# Patient Record
Sex: FEMALE | Race: WHITE | HISPANIC OR LATINO | Employment: UNEMPLOYED | URBAN - METROPOLITAN AREA
[De-identification: names, ages, dates, MRNs, and addresses within clinical notes are randomized per-mention and may not be internally consistent; named-entity substitution may affect disease eponyms.]

---
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
## 2018-12-12 ENCOUNTER — LAB (OUTPATIENT)
Dept: LAB | Facility: HOSPITAL | Age: 18
End: 2018-12-12

## 2018-12-12 ENCOUNTER — INITIAL PRENATAL (OUTPATIENT)
Dept: OBSTETRICS AND GYNECOLOGY | Facility: CLINIC | Age: 18
End: 2018-12-12

## 2018-12-12 VITALS — SYSTOLIC BLOOD PRESSURE: 122 MMHG | BODY MASS INDEX: 32.37 KG/M2 | DIASTOLIC BLOOD PRESSURE: 70 MMHG | WEIGHT: 177 LBS

## 2018-12-12 DIAGNOSIS — O21.9 NAUSEA AND VOMITING IN PREGNANCY PRIOR TO 22 WEEKS GESTATION: ICD-10-CM

## 2018-12-12 DIAGNOSIS — R10.9 ABDOMINAL PAIN IN PREGNANCY, FIRST TRIMESTER: ICD-10-CM

## 2018-12-12 DIAGNOSIS — O26.891 ABDOMINAL PAIN IN PREGNANCY, FIRST TRIMESTER: ICD-10-CM

## 2018-12-12 DIAGNOSIS — Z34.01 ENCOUNTER FOR SUPERVISION OF NORMAL FIRST PREGNANCY IN FIRST TRIMESTER: Primary | ICD-10-CM

## 2018-12-12 DIAGNOSIS — Z34.01 ENCOUNTER FOR SUPERVISION OF NORMAL FIRST PREGNANCY IN FIRST TRIMESTER: ICD-10-CM

## 2018-12-12 LAB
AMPHET+METHAMPHET UR QL: NEGATIVE
AMPHET+METHAMPHET UR QL: NEGATIVE
AMPHETAMINES UR QL: NEGATIVE
AMPHETAMINES UR QL: NEGATIVE
BARBITURATES UR QL SCN: NEGATIVE
BARBITURATES UR QL SCN: NEGATIVE
BASOPHILS # BLD AUTO: 0.05 10*3/MM3 (ref 0–0.2)
BASOPHILS NFR BLD AUTO: 0.4 % (ref 0–1)
BENZODIAZ UR QL SCN: NEGATIVE
BENZODIAZ UR QL SCN: NEGATIVE
BUPRENORPHINE SERPL-MCNC: NEGATIVE NG/ML
BUPRENORPHINE SERPL-MCNC: NEGATIVE NG/ML
CANNABINOIDS SERPL QL: POSITIVE
CANNABINOIDS SERPL QL: POSITIVE
COCAINE UR QL: NEGATIVE
COCAINE UR QL: NEGATIVE
DEPRECATED RDW RBC AUTO: 39 FL (ref 37–54)
EOSINOPHIL # BLD AUTO: 0.07 10*3/MM3 (ref 0–0.3)
EOSINOPHIL NFR BLD AUTO: 0.6 % (ref 0–3)
ERYTHROCYTE [DISTWIDTH] IN BLOOD BY AUTOMATED COUNT: 12.6 % (ref 11.3–14.5)
HBV SURFACE AG SERPL QL IA: NORMAL
HCT VFR BLD AUTO: 42.8 % (ref 34.5–44)
HCV AB SER DONR QL: NORMAL
HGB BLD-MCNC: 14.5 G/DL (ref 11.5–15.5)
HIV1+2 AB SER QL: NORMAL
IMM GRANULOCYTES # BLD: 0.03 10*3/MM3 (ref 0–0.03)
IMM GRANULOCYTES NFR BLD: 0.2 % (ref 0–0.6)
LYMPHOCYTES # BLD AUTO: 1.79 10*3/MM3 (ref 0.6–4.8)
LYMPHOCYTES NFR BLD AUTO: 14.6 % (ref 24–44)
MCH RBC QN AUTO: 28.9 PG (ref 27–31)
MCHC RBC AUTO-ENTMCNC: 33.9 G/DL (ref 32–36)
MCV RBC AUTO: 85.3 FL (ref 80–99)
METHADONE UR QL SCN: NEGATIVE
METHADONE UR QL SCN: NEGATIVE
MONOCYTES # BLD AUTO: 0.79 10*3/MM3 (ref 0–1)
MONOCYTES NFR BLD AUTO: 6.5 % (ref 0–12)
NEUTROPHILS # BLD AUTO: 9.52 10*3/MM3 (ref 1.5–8.3)
NEUTROPHILS NFR BLD AUTO: 77.9 % (ref 41–71)
OPIATES UR QL: NEGATIVE
OPIATES UR QL: NEGATIVE
OXYCODONE UR QL SCN: NEGATIVE
OXYCODONE UR QL SCN: NEGATIVE
PCP UR QL SCN: NEGATIVE
PCP UR QL SCN: NEGATIVE
PLATELET # BLD AUTO: 365 10*3/MM3 (ref 150–450)
PMV BLD AUTO: 9.7 FL (ref 6–12)
PROPOXYPH UR QL: NEGATIVE
PROPOXYPH UR QL: NEGATIVE
RBC # BLD AUTO: 5.02 10*6/MM3 (ref 3.89–5.14)
RUBV IGG SER QL: NORMAL
RUBV IGG SER-ACNC: 251.7 IU/ML
TRICYCLICS UR QL SCN: NEGATIVE
TRICYCLICS UR QL SCN: NEGATIVE
WBC NRBC COR # BLD: 12.22 10*3/MM3 (ref 4.5–13.5)

## 2018-12-12 PROCEDURE — 86901 BLOOD TYPING SEROLOGIC RH(D): CPT

## 2018-12-12 PROCEDURE — G0432 EIA HIV-1/HIV-2 SCREEN: HCPCS

## 2018-12-12 PROCEDURE — 86850 RBC ANTIBODY SCREEN: CPT

## 2018-12-12 PROCEDURE — 87086 URINE CULTURE/COLONY COUNT: CPT

## 2018-12-12 PROCEDURE — G0480 DRUG TEST DEF 1-7 CLASSES: HCPCS

## 2018-12-12 PROCEDURE — 80306 DRUG TEST PRSMV INSTRMNT: CPT

## 2018-12-12 PROCEDURE — 85025 COMPLETE CBC W/AUTO DIFF WBC: CPT

## 2018-12-12 PROCEDURE — 99204 OFFICE O/P NEW MOD 45 MIN: CPT | Performed by: OBSTETRICS & GYNECOLOGY

## 2018-12-12 PROCEDURE — 86803 HEPATITIS C AB TEST: CPT

## 2018-12-12 PROCEDURE — 80081 OBSTETRIC PANEL INC HIV TSTG: CPT

## 2018-12-12 PROCEDURE — 86762 RUBELLA ANTIBODY: CPT

## 2018-12-12 PROCEDURE — 80306 DRUG TEST PRSMV INSTRMNT: CPT | Performed by: OBSTETRICS & GYNECOLOGY

## 2018-12-12 PROCEDURE — 87340 HEPATITIS B SURFACE AG IA: CPT

## 2018-12-12 PROCEDURE — 86900 BLOOD TYPING SEROLOGIC ABO: CPT

## 2018-12-12 PROCEDURE — 87086 URINE CULTURE/COLONY COUNT: CPT | Performed by: OBSTETRICS & GYNECOLOGY

## 2018-12-12 RX ORDER — ONDANSETRON 4 MG/1
4 TABLET, FILM COATED ORAL EVERY 8 HOURS PRN
Qty: 30 TABLET | Refills: 1 | Status: ON HOLD | OUTPATIENT
Start: 2018-12-12 | End: 2019-04-25 | Stop reason: SDUPTHER

## 2018-12-12 RX ORDER — DIPHENHYDRAMINE HYDROCHLORIDE 25 MG/1
25 CAPSULE ORAL EVERY 8 HOURS PRN
Qty: 30 TABLET | Refills: 1 | Status: ON HOLD | OUTPATIENT
Start: 2018-12-12 | End: 2019-07-03

## 2018-12-12 NOTE — PROGRESS NOTES
Subjective   Chief Complaint   Patient presents with   • Initial Prenatal Visit     New OB, luiies c/o's       More Gaxiola is a 18 y.o. year old .  Patient's last menstrual period was 10/10/2018.  She presents to be seen to initiate prenatal care. LMP 10/10/18 - sure of this. Some mild vaginal spotting. No gross bleeding. Mild cramping. +Nausea and emesis.     Social History    Tobacco Use      Smoking status: Former Smoker        Types: Cigarettes        Quit date: 2018        Years since quittin.0      The following portions of the patient's history were reviewed and updated as appropriate:vital signs, allergies, current medications, past medical history, past social history, past surgical history and problem list.    Objective    Vitals:    18 1029   BP: 122/70     EXAM   General: NAD, appears stated age  Thyroid: normal exa   Abdomen: soft, nontender, gravid  Pelvic: NEFG, normal appearing cervix. Closed cervix. No adnexal masses or tenderness.       Lab Review   No data reviewed    Imaging   No data reviewed    Assessment/Plan   ASSESSMENT  1. 18 y.o. year old  at 9w0d by sure LMP  2. Supervision of low risk pregnancy   3. Abdominal pain in pregnancy     PLAN  1. The problem list for pregnancy was initiated today  2. Tests ordered today:  Orders Placed This Encounter   Procedures   • Urine Culture - Urine, Urine, Clean Catch   • Chlamydia trachomatis, Neisseria gonorrhoeae, Trichomonas vaginalis, PCR - Swab, Cervix     Standing Status:   Future     Standing Expiration Date:   2019   • US Ob Transvaginal     Standing Status:   Future     Standing Expiration Date:   2019     Order Specific Question:   Reason for Exam:     Answer:   establish EDC, abdominal pain in pregnancy, LMP 10/10/18   • Urine Drug Screen - Urine, Clean Catch     Please confirm all positive UDS   • OB Panel With HIV     Standing Status:   Future     Standing Expiration Date:   2019   • Hepatitis C  Antibody     Standing Status:   Future     Standing Expiration Date:   12/12/2019     3. Testing for GC / Chlamydia / trichomonas was done today  4. Genetic testing reviewed: MSAFP-4, NIPT ([facility]) and they will consider the information and make a decision at a later date.  5. Information reviewed: exercise in pregnancy, nutrition in pregnancy, weight gain in pregnancy, work and travel restrictions during pregnancy, list of OTC medications acceptable in pregnancy and call coverage groups    Total time spent today with More  was 50 minutes (level 4).  Off this time, > 50% was spent face-to-face time coordinating care, answering her questions and counseling regarding pathophysiology of her presenting problem along with plans for any diagnositc work-up and treatment.    Follow up: 4 week(s)       This note was electronically signed.    Eli Rodriguez MD  December 12, 2018

## 2018-12-13 LAB
ABO GROUP BLD: NORMAL
BLD GP AB SCN SERPL QL: NEGATIVE
RH BLD: POSITIVE

## 2018-12-14 LAB
BACTERIA SPEC AEROBE CULT: NORMAL
BACTERIA SPEC AEROBE CULT: NORMAL
RPR SER QL: NORMAL

## 2018-12-17 ENCOUNTER — TELEPHONE (OUTPATIENT)
Dept: OBSTETRICS AND GYNECOLOGY | Facility: CLINIC | Age: 18
End: 2018-12-17

## 2018-12-17 PROBLEM — Z34.00 SUPERVISION OF NORMAL FIRST PREGNANCY: Status: ACTIVE | Noted: 2018-12-17

## 2018-12-17 PROBLEM — Z34.01 ENCOUNTER FOR SUPERVISION OF NORMAL FIRST PREGNANCY IN FIRST TRIMESTER: Status: RESOLVED | Noted: 2018-12-12 | Resolved: 2018-12-17

## 2018-12-18 LAB — CANNABINOIDS UR QL CFM: POSITIVE

## 2019-01-16 ENCOUNTER — ROUTINE PRENATAL (OUTPATIENT)
Dept: OBSTETRICS AND GYNECOLOGY | Facility: CLINIC | Age: 19
End: 2019-01-16

## 2019-01-16 VITALS — SYSTOLIC BLOOD PRESSURE: 118 MMHG | DIASTOLIC BLOOD PRESSURE: 62 MMHG | WEIGHT: 176 LBS | BODY MASS INDEX: 32.19 KG/M2

## 2019-01-16 DIAGNOSIS — Z34.02 ENCOUNTER FOR SUPERVISION OF NORMAL FIRST PREGNANCY IN SECOND TRIMESTER: Primary | ICD-10-CM

## 2019-01-16 DIAGNOSIS — O21.9 NAUSEA AND VOMITING IN PREGNANCY PRIOR TO 22 WEEKS GESTATION: ICD-10-CM

## 2019-01-16 PROCEDURE — 99213 OFFICE O/P EST LOW 20 MIN: CPT | Performed by: OBSTETRICS & GYNECOLOGY

## 2019-01-16 NOTE — PROGRESS NOTES
Chief Complaint   Patient presents with   • Routine Prenatal Visit     ob visit, reports sharp shootong pains in right abd       HPI: More is a  currently at 14w0d who today reports the following:  Nausea - improved as compared to the prior visit; Vaginal bleeding -  No; Heartburn - No.    ROS:  GI: Constipation - improved as compared to the prior visit; Diarrhea - No    Neuro: Headache - No; Visual change - No      EXAM:  Vitals: See prenatal flowsheet   Abdomen: See prenatal flowsheet   Urine glucose/protein: See prenatal flowsheet   Pelvic: See prenatal flowsheet     Prenatal Labs  Lab Results   Component Value Date    HGB 14.5 2018    RUBELLAABIGG 251.7 2018    RUBELLAABIGG Immune 2018    HEPBSAG Non-Reactive 2018    ABSCRN Negative 2018    QKJ7RWH2 Non-Reactive 2018    HEPCVIRUSABY Non-Reactive 2018    URINECX >100,000 CFU/mL Normal Urogenital Di 2018       MDM:  Impression: 1. Supervision of low risk pregnancy   Tests done today: 1. none   Topics discussed: 1. Continue with PNV's  2. Prenatal labs reviewed  3. Discussed MSAFP-4 and NIPS. Patient and partner decline genetic screening.  4. none - she had no major complaints,questions or concerns   5. Follow OB appointment in 5 weeks.   Tests scheduled today for her next visit:   U/S for anatomic screening

## 2019-02-20 ENCOUNTER — ROUTINE PRENATAL (OUTPATIENT)
Dept: OBSTETRICS AND GYNECOLOGY | Facility: CLINIC | Age: 19
End: 2019-02-20

## 2019-02-20 VITALS — SYSTOLIC BLOOD PRESSURE: 118 MMHG | WEIGHT: 187 LBS | DIASTOLIC BLOOD PRESSURE: 70 MMHG | BODY MASS INDEX: 34.2 KG/M2

## 2019-02-20 DIAGNOSIS — O21.9 NAUSEA AND VOMITING IN PREGNANCY PRIOR TO 22 WEEKS GESTATION: ICD-10-CM

## 2019-02-20 DIAGNOSIS — Z34.02 ENCOUNTER FOR SUPERVISION OF NORMAL FIRST PREGNANCY IN SECOND TRIMESTER: ICD-10-CM

## 2019-02-20 PROCEDURE — 99213 OFFICE O/P EST LOW 20 MIN: CPT | Performed by: OBSTETRICS & GYNECOLOGY

## 2019-02-20 NOTE — PROGRESS NOTES
Chief Complaint   Patient presents with   • Routine Prenatal Visit     ob visit with [facility] u/s       HPI: More is a  currently at 19w0d who today reports the following:  Contractions - No; Leaking - No; Vaginal bleeding -  No; Heartburn - No.    ROS:  GI: Nausea - No ; Constipation - No; Diarrhea - No    Neuro: Headache - No ; Visual change - No      EXAM:  Vitals: See prenatal flowsheet   Abdomen: See prenatal flowsheet   Urine glucose/protein: See prenatal flowsheet   Pelvic: See prenatal flowsheet     Lab Results   Component Value Date    ABO O 2018    RH Positive 2018    ABSCRN Negative 2018       MDM:  Impression: 1. Supervision of low risk pregnancy   Tests done today: 1. U/S for anatomic screening - anatomy was not completely seen today   Topics discussed: 1. Continue with PNV's  2. Prenatal labs reviewed  3. none - she had no major complaints,questions or concerns   Tests scheduled today for her next visit:   U/S to complete the anatomic screening in 4 weeks

## 2019-03-20 ENCOUNTER — ROUTINE PRENATAL (OUTPATIENT)
Dept: OBSTETRICS AND GYNECOLOGY | Facility: CLINIC | Age: 19
End: 2019-03-20

## 2019-03-20 VITALS — SYSTOLIC BLOOD PRESSURE: 118 MMHG | BODY MASS INDEX: 32.63 KG/M2 | DIASTOLIC BLOOD PRESSURE: 78 MMHG | WEIGHT: 178.4 LBS

## 2019-03-20 DIAGNOSIS — Z34.02 ENCOUNTER FOR SUPERVISION OF NORMAL FIRST PREGNANCY IN SECOND TRIMESTER: Primary | ICD-10-CM

## 2019-03-20 PROBLEM — O21.9 NAUSEA AND VOMITING IN PREGNANCY PRIOR TO 22 WEEKS GESTATION: Status: RESOLVED | Noted: 2018-12-12 | Resolved: 2019-03-20

## 2019-03-20 PROCEDURE — 99213 OFFICE O/P EST LOW 20 MIN: CPT | Performed by: OBSTETRICS & GYNECOLOGY

## 2019-03-20 NOTE — PROGRESS NOTES
Chief Complaint   Patient presents with   • Routine Prenatal Visit     pt states no c/o       HPI: More is a  currently at 23w0d who today reports the following:  Contractions - No; Leaking - No; Vaginal bleeding -  No; Heartburn - No.    ROS:  GI: Nausea - No ; Constipation - No; Diarrhea - No    Neuro: Headache - No ; Visual change - No      EXAM:  Vitals: See prenatal flowsheet   Abdomen: See prenatal flowsheet   Urine glucose/protein: See prenatal flowsheet   Pelvic: See prenatal flowsheet     Lab Results   Component Value Date    ABO O 2018    RH Positive 2018    ABSCRN Negative 2018       MDM:  Impression: 1. Supervision of low risk pregnancy   Tests done today: 1. U/S to complete the anatomic screening - U/S report is not available for review at this time   Topics discussed: 1. Continue with PNV's  2. Prenatal labs reviewed  3. Fetal kick counts at 25+ weeks   Tests scheduled today for her next visit:   GCT  HgB

## 2019-04-17 ENCOUNTER — APPOINTMENT (OUTPATIENT)
Dept: LAB | Facility: HOSPITAL | Age: 19
End: 2019-04-17

## 2019-04-17 ENCOUNTER — ROUTINE PRENATAL (OUTPATIENT)
Dept: OBSTETRICS AND GYNECOLOGY | Facility: CLINIC | Age: 19
End: 2019-04-17

## 2019-04-17 VITALS — BODY MASS INDEX: 33.69 KG/M2 | DIASTOLIC BLOOD PRESSURE: 92 MMHG | SYSTOLIC BLOOD PRESSURE: 132 MMHG | WEIGHT: 184.2 LBS

## 2019-04-17 DIAGNOSIS — Z34.02 ENCOUNTER FOR SUPERVISION OF NORMAL FIRST PREGNANCY IN SECOND TRIMESTER: ICD-10-CM

## 2019-04-17 DIAGNOSIS — O16.2 ELEVATED BLOOD PRESSURE AFFECTING PREGNANCY IN SECOND TRIMESTER, ANTEPARTUM: Primary | ICD-10-CM

## 2019-04-17 PROBLEM — O16.9 ELEVATED BLOOD PRESSURE AFFECTING PREGNANCY, ANTEPARTUM: Status: ACTIVE | Noted: 2019-04-17

## 2019-04-17 LAB
ALP SERPL-CCNC: 109 U/L (ref 39–117)
ALT SERPL W P-5'-P-CCNC: 27 U/L (ref 1–33)
AST SERPL-CCNC: 20 U/L (ref 1–32)
BILIRUB SERPL-MCNC: 0.2 MG/DL (ref 0.2–1.2)
CREAT BLD-MCNC: 0.59 MG/DL (ref 0.57–1)
DEPRECATED RDW RBC AUTO: 39.8 FL (ref 37–54)
ERYTHROCYTE [DISTWIDTH] IN BLOOD BY AUTOMATED COUNT: 12.7 % (ref 12.3–15.4)
HCT VFR BLD AUTO: 36.9 % (ref 34–46.6)
HGB BLD-MCNC: 11.6 G/DL (ref 12–15.9)
LDH SERPL-CCNC: 285 U/L (ref 135–214)
MCH RBC QN AUTO: 27.4 PG (ref 26.6–33)
MCHC RBC AUTO-ENTMCNC: 31.4 G/DL (ref 31.5–35.7)
MCV RBC AUTO: 87 FL (ref 79–97)
PLATELET # BLD AUTO: 311 10*3/MM3 (ref 140–450)
PMV BLD AUTO: 10.9 FL (ref 6–12)
RBC # BLD AUTO: 4.24 10*6/MM3 (ref 3.77–5.28)
URATE SERPL-MCNC: 4.9 MG/DL (ref 2.4–5.7)
WBC NRBC COR # BLD: 10.73 10*3/MM3 (ref 3.4–10.8)

## 2019-04-17 PROCEDURE — 85027 COMPLETE CBC AUTOMATED: CPT | Performed by: OBSTETRICS & GYNECOLOGY

## 2019-04-17 PROCEDURE — 82565 ASSAY OF CREATININE: CPT | Performed by: OBSTETRICS & GYNECOLOGY

## 2019-04-17 PROCEDURE — 82247 BILIRUBIN TOTAL: CPT | Performed by: OBSTETRICS & GYNECOLOGY

## 2019-04-17 PROCEDURE — 84450 TRANSFERASE (AST) (SGOT): CPT | Performed by: OBSTETRICS & GYNECOLOGY

## 2019-04-17 PROCEDURE — 36415 COLL VENOUS BLD VENIPUNCTURE: CPT | Performed by: OBSTETRICS & GYNECOLOGY

## 2019-04-17 PROCEDURE — 83615 LACTATE (LD) (LDH) ENZYME: CPT | Performed by: OBSTETRICS & GYNECOLOGY

## 2019-04-17 PROCEDURE — 84550 ASSAY OF BLOOD/URIC ACID: CPT | Performed by: OBSTETRICS & GYNECOLOGY

## 2019-04-17 PROCEDURE — 84460 ALANINE AMINO (ALT) (SGPT): CPT | Performed by: OBSTETRICS & GYNECOLOGY

## 2019-04-17 PROCEDURE — 99213 OFFICE O/P EST LOW 20 MIN: CPT | Performed by: OBSTETRICS & GYNECOLOGY

## 2019-04-17 PROCEDURE — 84075 ASSAY ALKALINE PHOSPHATASE: CPT | Performed by: OBSTETRICS & GYNECOLOGY

## 2019-04-17 NOTE — PROGRESS NOTES
Chief Complaint   Patient presents with   • Routine Prenatal Visit       HPI: More is a  currently at 27w0d who today reports the following:  Contractions - No; Leaking - No; Vaginal bleeding -  No; Heartburn - No.    ROS:  GI: Nausea - No ; Constipation - No; Diarrhea - No    Neuro: Headache - No ; Visual change - No      EXAM:  Vitals: See prenatal flowsheet   Abdomen: See prenatal flowsheet   Urine glucose/protein: See prenatal flowsheet   Pelvic: See prenatal flowsheet     Lab Results   Component Value Date    ABO O 2018    RH Positive 2018    ABSCRN Negative 2018       MDM:  Impression: 1. Supervision of low risk pregnancy   2. Mild range blood pressure with trace urine protein and asymptomatic    Tests done today: 1. none   Topics discussed: 1. Continue with PNV's  2. Prenatal labs reviewed  3. symptoms of preeclampsia   Tests scheduled today for her next visit:   PEP, CBC, 24 hour urine collection and close follow up in one week

## 2019-04-20 ENCOUNTER — LAB (OUTPATIENT)
Dept: LAB | Facility: HOSPITAL | Age: 19
End: 2019-04-20

## 2019-04-20 DIAGNOSIS — Z34.02 ENCOUNTER FOR SUPERVISION OF NORMAL FIRST PREGNANCY IN SECOND TRIMESTER: ICD-10-CM

## 2019-04-20 LAB
COLLECT DURATION TIME UR: 24 HRS
PROT 24H UR-MRATE: 105 MG/24HOURS (ref 0–150)
PROT UR-MCNC: 6 MG/DL
SPECIMEN VOL 24H UR: 1750 ML

## 2019-04-20 PROCEDURE — 84156 ASSAY OF PROTEIN URINE: CPT

## 2019-04-20 PROCEDURE — 81050 URINALYSIS VOLUME MEASURE: CPT

## 2019-04-23 ENCOUNTER — APPOINTMENT (OUTPATIENT)
Dept: WOMENS IMAGING | Facility: HOSPITAL | Age: 19
End: 2019-04-23

## 2019-04-23 ENCOUNTER — HOSPITAL ENCOUNTER (OUTPATIENT)
Facility: HOSPITAL | Age: 19
Setting detail: OBSERVATION
Discharge: HOME | End: 2019-04-25
Attending: OBSTETRICS & GYNECOLOGY | Admitting: OBSTETRICS & GYNECOLOGY

## 2019-04-23 ENCOUNTER — APPOINTMENT (OUTPATIENT)
Dept: CT IMAGING | Facility: HOSPITAL | Age: 19
End: 2019-04-23

## 2019-04-23 DIAGNOSIS — R10.10 UPPER ABDOMINAL PAIN: ICD-10-CM

## 2019-04-23 DIAGNOSIS — R11.2 INTRACTABLE VOMITING WITH NAUSEA, UNSPECIFIED VOMITING TYPE: Primary | ICD-10-CM

## 2019-04-23 PROBLEM — Z34.90 PREGNANCY: Status: ACTIVE | Noted: 2019-04-23

## 2019-04-23 LAB
ALBUMIN SERPL-MCNC: 3.5 G/DL (ref 3.5–5.2)
ALBUMIN/GLOB SERPL: 1 G/DL
ALP SERPL-CCNC: 114 U/L (ref 39–117)
ALP SERPL-CCNC: 116 U/L (ref 39–117)
ALT SERPL W P-5'-P-CCNC: 16 U/L (ref 1–33)
ALT SERPL W P-5'-P-CCNC: 19 U/L (ref 1–33)
AMYLASE SERPL-CCNC: 70 U/L (ref 28–100)
ANION GAP SERPL CALCULATED.3IONS-SCNC: 15 MMOL/L
AST SERPL-CCNC: 16 U/L (ref 1–32)
AST SERPL-CCNC: 26 U/L (ref 1–32)
BACTERIA UR QL AUTO: ABNORMAL /HPF
BILIRUB SERPL-MCNC: 0.3 MG/DL (ref 0.2–1.2)
BILIRUB SERPL-MCNC: 0.3 MG/DL (ref 0.2–1.2)
BILIRUB UR QL STRIP: NEGATIVE
BUN BLD-MCNC: 6 MG/DL (ref 6–20)
BUN/CREAT SERPL: 14.3 (ref 7–25)
CALCIUM SPEC-SCNC: 8.9 MG/DL (ref 8.6–10.5)
CHLORIDE SERPL-SCNC: 107 MMOL/L (ref 98–107)
CLARITY UR: ABNORMAL
CO2 SERPL-SCNC: 19 MMOL/L (ref 22–29)
COLOR UR: YELLOW
CREAT BLD-MCNC: 0.42 MG/DL (ref 0.57–1)
CREAT BLD-MCNC: 0.42 MG/DL (ref 0.57–1)
DEPRECATED RDW RBC AUTO: 38.5 FL (ref 37–54)
ERYTHROCYTE [DISTWIDTH] IN BLOOD BY AUTOMATED COUNT: 12.7 % (ref 12.3–15.4)
GFR SERPL CREATININE-BSD FRML MDRD: >150 ML/MIN/1.73
GFR SERPL CREATININE-BSD FRML MDRD: >150 ML/MIN/1.73
GLOBULIN UR ELPH-MCNC: 3.4 GM/DL
GLUCOSE BLD-MCNC: 108 MG/DL (ref 65–99)
GLUCOSE BLDC GLUCOMTR-MCNC: 95 MG/DL (ref 70–130)
GLUCOSE UR STRIP-MCNC: NEGATIVE MG/DL
HCT VFR BLD AUTO: 36.1 % (ref 34–46.6)
HGB BLD-MCNC: 12.1 G/DL (ref 12–15.9)
HGB UR QL STRIP.AUTO: NEGATIVE
HYALINE CASTS UR QL AUTO: ABNORMAL /LPF
KETONES UR QL STRIP: NEGATIVE
LDH SERPL-CCNC: 170 U/L (ref 135–214)
LEUKOCYTE ESTERASE UR QL STRIP.AUTO: ABNORMAL
LIPASE SERPL-CCNC: 17 U/L (ref 13–60)
MCH RBC QN AUTO: 28.1 PG (ref 26.6–33)
MCHC RBC AUTO-ENTMCNC: 33.5 G/DL (ref 31.5–35.7)
MCV RBC AUTO: 83.8 FL (ref 79–97)
NITRITE UR QL STRIP: NEGATIVE
PH UR STRIP.AUTO: 6 [PH] (ref 5–8)
PLATELET # BLD AUTO: 288 10*3/MM3 (ref 140–450)
PMV BLD AUTO: 10.2 FL (ref 6–12)
POTASSIUM BLD-SCNC: 4.1 MMOL/L (ref 3.5–5.2)
PROT SERPL-MCNC: 6.9 G/DL (ref 6–8.5)
PROT UR QL STRIP: NEGATIVE
RBC # BLD AUTO: 4.31 10*6/MM3 (ref 3.77–5.28)
RBC # UR: ABNORMAL /HPF
REF LAB TEST METHOD: ABNORMAL
SODIUM BLD-SCNC: 141 MMOL/L (ref 136–145)
SP GR UR STRIP: 1.02 (ref 1–1.03)
SQUAMOUS #/AREA URNS HPF: ABNORMAL /HPF
URATE SERPL-MCNC: 5.1 MG/DL (ref 2.4–5.7)
UROBILINOGEN UR QL STRIP: ABNORMAL
WBC NRBC COR # BLD: 12.96 10*3/MM3 (ref 3.4–10.8)
WBC UR QL AUTO: ABNORMAL /HPF

## 2019-04-23 PROCEDURE — G0378 HOSPITAL OBSERVATION PER HR: HCPCS

## 2019-04-23 PROCEDURE — 80053 COMPREHEN METABOLIC PANEL: CPT | Performed by: OBSTETRICS & GYNECOLOGY

## 2019-04-23 PROCEDURE — 82150 ASSAY OF AMYLASE: CPT | Performed by: OBSTETRICS & GYNECOLOGY

## 2019-04-23 PROCEDURE — 59025 FETAL NON-STRESS TEST: CPT

## 2019-04-23 PROCEDURE — 82962 GLUCOSE BLOOD TEST: CPT

## 2019-04-23 PROCEDURE — 96376 TX/PRO/DX INJ SAME DRUG ADON: CPT

## 2019-04-23 PROCEDURE — 84460 ALANINE AMINO (ALT) (SGPT): CPT | Performed by: OBSTETRICS & GYNECOLOGY

## 2019-04-23 PROCEDURE — 25010000002 PROMETHAZINE PER 50 MG: Performed by: OBSTETRICS & GYNECOLOGY

## 2019-04-23 PROCEDURE — 84075 ASSAY ALKALINE PHOSPHATASE: CPT | Performed by: OBSTETRICS & GYNECOLOGY

## 2019-04-23 PROCEDURE — 96372 THER/PROPH/DIAG INJ SC/IM: CPT

## 2019-04-23 PROCEDURE — 87086 URINE CULTURE/COLONY COUNT: CPT | Performed by: OBSTETRICS & GYNECOLOGY

## 2019-04-23 PROCEDURE — 76811 OB US DETAILED SNGL FETUS: CPT

## 2019-04-23 PROCEDURE — 76811 OB US DETAILED SNGL FETUS: CPT | Performed by: OBSTETRICS & GYNECOLOGY

## 2019-04-23 PROCEDURE — 83690 ASSAY OF LIPASE: CPT | Performed by: OBSTETRICS & GYNECOLOGY

## 2019-04-23 PROCEDURE — 80306 DRUG TEST PRSMV INSTRMNT: CPT | Performed by: INTERNAL MEDICINE

## 2019-04-23 PROCEDURE — 87077 CULTURE AEROBIC IDENTIFY: CPT | Performed by: OBSTETRICS & GYNECOLOGY

## 2019-04-23 PROCEDURE — 25010000002 MAGNESIUM SULFATE-LACT RINGERS 40 GM/580ML SOLUTION: Performed by: OBSTETRICS & GYNECOLOGY

## 2019-04-23 PROCEDURE — 84550 ASSAY OF BLOOD/URIC ACID: CPT | Performed by: OBSTETRICS & GYNECOLOGY

## 2019-04-23 PROCEDURE — 81001 URINALYSIS AUTO W/SCOPE: CPT | Performed by: OBSTETRICS & GYNECOLOGY

## 2019-04-23 PROCEDURE — 74176 CT ABD & PELVIS W/O CONTRAST: CPT

## 2019-04-23 PROCEDURE — 99218 PR INITIAL OBSERVATION CARE/DAY 30 MINUTES: CPT | Performed by: OBSTETRICS & GYNECOLOGY

## 2019-04-23 PROCEDURE — 83615 LACTATE (LD) (LDH) ENZYME: CPT | Performed by: OBSTETRICS & GYNECOLOGY

## 2019-04-23 PROCEDURE — 96366 THER/PROPH/DIAG IV INF ADDON: CPT

## 2019-04-23 PROCEDURE — 84450 TRANSFERASE (AST) (SGOT): CPT | Performed by: OBSTETRICS & GYNECOLOGY

## 2019-04-23 PROCEDURE — 85027 COMPLETE CBC AUTOMATED: CPT | Performed by: OBSTETRICS & GYNECOLOGY

## 2019-04-23 PROCEDURE — 59025 FETAL NON-STRESS TEST: CPT | Performed by: OBSTETRICS & GYNECOLOGY

## 2019-04-23 PROCEDURE — 87186 SC STD MICRODIL/AGAR DIL: CPT | Performed by: OBSTETRICS & GYNECOLOGY

## 2019-04-23 PROCEDURE — 82565 ASSAY OF CREATININE: CPT | Performed by: OBSTETRICS & GYNECOLOGY

## 2019-04-23 PROCEDURE — 25010000002 BETAMETHASONE ACET & SOD PHOS PER 4 MG: Performed by: OBSTETRICS & GYNECOLOGY

## 2019-04-23 PROCEDURE — 96365 THER/PROPH/DIAG IV INF INIT: CPT

## 2019-04-23 PROCEDURE — 96375 TX/PRO/DX INJ NEW DRUG ADDON: CPT

## 2019-04-23 PROCEDURE — 82247 BILIRUBIN TOTAL: CPT | Performed by: OBSTETRICS & GYNECOLOGY

## 2019-04-23 RX ORDER — MAGNESIUM SULF/RINGERS LACTATE 40 G/500ML
2 PLASTIC BAG, INJECTION (ML) INTRAVENOUS CONTINUOUS
Status: DISCONTINUED | OUTPATIENT
Start: 2019-04-23 | End: 2019-04-24

## 2019-04-23 RX ORDER — PROMETHAZINE HYDROCHLORIDE 25 MG/ML
12.5 INJECTION, SOLUTION INTRAMUSCULAR; INTRAVENOUS EVERY 6 HOURS PRN
Status: DISCONTINUED | OUTPATIENT
Start: 2019-04-23 | End: 2019-04-25

## 2019-04-23 RX ORDER — ACETAMINOPHEN 500 MG
1000 TABLET ORAL EVERY 4 HOURS PRN
Status: DISCONTINUED | OUTPATIENT
Start: 2019-04-23 | End: 2019-04-25 | Stop reason: HOSPADM

## 2019-04-23 RX ORDER — SODIUM CHLORIDE, SODIUM LACTATE, POTASSIUM CHLORIDE, CALCIUM CHLORIDE 600; 310; 30; 20 MG/100ML; MG/100ML; MG/100ML; MG/100ML
1000 INJECTION, SOLUTION INTRAVENOUS ONCE
Status: DISCONTINUED | OUTPATIENT
Start: 2019-04-23 | End: 2019-04-25 | Stop reason: HOSPADM

## 2019-04-23 RX ORDER — DEXTROSE AND SODIUM CHLORIDE 5; .2 G/100ML; G/100ML
96 INJECTION, SOLUTION INTRAVENOUS CONTINUOUS
Status: DISCONTINUED | OUTPATIENT
Start: 2019-04-23 | End: 2019-04-24

## 2019-04-23 RX ORDER — FAMOTIDINE 10 MG/ML
20 INJECTION, SOLUTION INTRAVENOUS 2 TIMES DAILY
Status: DISCONTINUED | OUTPATIENT
Start: 2019-04-23 | End: 2019-04-24

## 2019-04-23 RX ORDER — DEXTROSE, SODIUM CHLORIDE, SODIUM LACTATE, POTASSIUM CHLORIDE, AND CALCIUM CHLORIDE 5; .6; .31; .03; .02 G/100ML; G/100ML; G/100ML; G/100ML; G/100ML
1000 INJECTION, SOLUTION INTRAVENOUS CONTINUOUS
Status: DISCONTINUED | OUTPATIENT
Start: 2019-04-23 | End: 2019-04-23

## 2019-04-23 RX ORDER — BETAMETHASONE SODIUM PHOSPHATE AND BETAMETHASONE ACETATE 3; 3 MG/ML; MG/ML
12 INJECTION, SUSPENSION INTRA-ARTICULAR; INTRALESIONAL; INTRAMUSCULAR; SOFT TISSUE EVERY 24 HOURS
Status: COMPLETED | OUTPATIENT
Start: 2019-04-23 | End: 2019-04-24

## 2019-04-23 RX ADMIN — ACETAMINOPHEN 1000 MG: 500 TABLET, FILM COATED ORAL at 14:17

## 2019-04-23 RX ADMIN — SODIUM CHLORIDE, SODIUM LACTATE, POTASSIUM CHLORIDE, CALCIUM CHLORIDE AND DEXTROSE MONOHYDRATE 1000 ML/HR: 5; 600; 310; 30; 20 INJECTION, SOLUTION INTRAVENOUS at 14:54

## 2019-04-23 RX ADMIN — PROMETHAZINE HYDROCHLORIDE 12.5 MG: 25 INJECTION, SOLUTION INTRAMUSCULAR; INTRAVENOUS at 14:54

## 2019-04-23 RX ADMIN — BETAMETHASONE SODIUM PHOSPHATE AND BETAMETHASONE ACETATE 12 MG: 3; 3 INJECTION, SUSPENSION INTRA-ARTICULAR; INTRALESIONAL; INTRAMUSCULAR at 20:32

## 2019-04-23 RX ADMIN — DEXTROSE AND SODIUM CHLORIDE 96 ML/HR: 5; 200 INJECTION, SOLUTION INTRAVENOUS at 21:52

## 2019-04-23 RX ADMIN — Medication 2 G/HR: at 17:24

## 2019-04-23 RX ADMIN — SODIUM CHLORIDE, SODIUM LACTATE, POTASSIUM CHLORIDE, CALCIUM CHLORIDE AND DEXTROSE MONOHYDRATE 1000 ML/HR: 5; 600; 310; 30; 20 INJECTION, SOLUTION INTRAVENOUS at 15:26

## 2019-04-23 RX ADMIN — DEXTROSE AND SODIUM CHLORIDE 96 ML/HR: 5; 200 INJECTION, SOLUTION INTRAVENOUS at 17:02

## 2019-04-23 RX ADMIN — FAMOTIDINE 20 MG: 10 INJECTION, SOLUTION INTRAVENOUS at 20:32

## 2019-04-23 NOTE — PROGRESS NOTES
Laborist      Patient's pain has   Progressed with multiple episodes of nausea and vomiting.    PDC ultrasound   revealed a single IUP, fetus active, no evidence of placental abruption,   Normal amniotic fluid, and a small VSD- noted see report.    PLAN  1.  Magnesium sulfate for neuro protection and uterine tocolysis, strain urine, CT of abdomen and pelvis to evaluate for ureterolithiasis and appendicitis.  Will hold off on steroids for fetal lung maturity enhancement until after the CT scan.  Discussed with patient the risk of a CT-slight increased risk of childhood leukemia above the normal population.  All questions answered patient agreed with plan.  Discussed plan with Dr. Rodriguez

## 2019-04-23 NOTE — H&P
"\[facility]  Obstetric History and Physical    Referring Provider: Eli Rodriguez MD      Chief Complaint   Patient presents with   • right sided pain     decreased fm \" i felt the baby move last night \" \" I am having pain in my right shoulder and back \"       Subjective     Patient is a 18 y.o. female  currently at 27w6d, who presents with c/o upper abdominal pain.  She reports sharp transient right upper quadrant pain that began at 10:00 this morning.  Patient denies any other associated symptoms including nausea, vomiting, recent trauma, fever, leaking of fluid, vaginal bleeding, and regular uterine activity.  Patient reports normal fetal activity.  Prenatal care by Dr. Rodriguez.  Patient recently had a mildly elevated blood pressure and complete a 24 urine protein which was reported as normal and normal pre-eclamptic panel.    .    The following portions of the patients history were reviewed and updated as appropriate: current medications, allergies, past medical history, past surgical history, past family history, past social history and problem list .       Prenatal Information:   Maternal Prenatal Labs  Blood Type No results found for: ABO   Rh Status No results found for: RH   Antibody Screen No results found for: ABSCRN   Gonnorhea No results found for: GCCX   Chlamydia No results found for: CLAMYDCU   RPR No results found for: RPR   Syphilis Antibody No results found for: SYPHILIS   Rubella No results found for: RUBELLAIGGIN   Hepatitis B Surface Antigen No results found for: HEPBSAG   HIV-1 Antibody No results found for: LABHIV1   Hepatitis C Antibody No results found for: HEPCAB   Rapid Urin Drug Screen No results found for: AMPMETHU, BARBITSCNUR, LABBENZSCN, LABMETHSCN, LABOPIASCN, THCURSCR, COCAINEUR, AMPHETSCREEN, PROPOXSCN, BUPRENORSCNU, METAMPSCNUR, OXYCODONESCN, TRICYCLICSCN   Group B Strep Culture No results found for: GBSANTIGEN           External Prenatal Results     Pregnancy Outside " Results - Transcribed From Office Records - See Scanned Records For Details     Test Value Date Time    Hgb 11.6 g/dL 19 1634    Hct 36.9 % 19 1634    ABO O  18 1151    Rh Positive  18 1151    Antibody Screen Negative  18 1151    Glucose Fasting GTT       Glucose Tolerance Test 1 hour       Glucose Tolerance Test 3 hour       Gonorrhea (discrete)       Chlamydia (discrete)       RPR Non-Reactive  18 1151    VDRL       Syphilis Antibody       Rubella 251.7 IU/mL 18 1151      Immune  18 1151    HBsAg Non-Reactive  18 1151    Herpes Simplex Virus PCR       Herpes Simplex VIrus Culture       HIV Non-Reactive  18 1151    Hep C RNA Quant PCR       Hep C Antibody Non-Reactive  18 1151    AFP       Group B Strep       GBS Susceptibility to Clindamycin       GBS Susceptibility to Erythromycin       Fetal Fibronectin       Genetic Testing, Maternal Blood             Drug Screening     Test Value Date Time    Urine Drug Screen       Amphetamine Screen Negative  18 1718    Barbiturate Screen Negative  18 1718    Benzodiazepine Screen Negative  18 1718    Methadone Screen Negative  18 1718    Phencyclidine Screen Negative  18 1718    Opiates Screen Negative  18 1718    THC Screen Positive  18 1718    Cocaine Screen       Propoxyphene Screen Negative  18 1718    Buprenorphine Screen Negative  18 1718    Methamphetamine Screen       Oxycodone Screen Negative  18 1718    Tricyclic Antidepressants Screen Negative  18 1718                  Past OB History:       Obstetric History       T0      L0     SAB0   TAB0   Ectopic0   Molar0   Multiple0   Live Births0       # Outcome Date GA Lbr Aftab/2nd Weight Sex Delivery Anes PTL Lv   1 Current               Obstetric Comments   Denies history of STIs       Past Medical History: Past Medical History:   Diagnosis Date   • Seasonal allergies        Past Surgical History Past Surgical History:   Procedure Laterality Date   • FOREIGN BODY REMOVAL  2013    glass removed from foot   • WISDOM TOOTH EXTRACTION  2011      Family History: Family History   Problem Relation Age of Onset   • Cancer Mother    • Obesity Mother    • Fibroids Mother    • Ovarian cancer Maternal Grandmother    • Breast cancer Maternal Aunt         older than 40s   • Crohn's disease Maternal Aunt    • Colon cancer Maternal Uncle       Social History:  reports that she quit smoking about 5 months ago. Her smoking use included cigarettes. She has never used smokeless tobacco.   reports that she does not drink alcohol.   reports that she does not use drugs.                   General ROS Negative Findings:Headaches, Visual Changes, Epigastric pain, Anorexia, Nausia/Vomiting, ROM and Vaginal Bleeding    ROS     All other systems have been reviewed and are neg  Objective       Vital Signs Range for the last 24 hours  Temperature: Temp:  [98.4 °F (36.9 °C)] 98.4 °F (36.9 °C)   Temp Source: Temp src: Oral   BP: BP: (128)/(72) 128/72   Pulse: Heart Rate:  [102] 102   Respirations: Resp:  [18] 18   SPO2:     O2 Amount (l/min):     O2 Devices     Weight:       Physical Examination:   General:   alert, appears stated age and cooperative   Skin:   normal   HEENT:  Sclera clear   Lungs:   clear to auscultation bilaterally   Heart:   regular rate and rhythm, S1, S2 normal, no murmur, click, rub or gallop   Abdomen:  Soft, bowel sounds present, no guarding, rebound, benign exam negative CVA tenderness   Lower Extremeties  no edema, no calf tenderness   Pelvis:  Exam deferred.         Presentation:    Cervix: Exam by:     Dilation:     Effacement:     Station:         Fetal Heart Rate Assessment   Method: Fetal HR Assessment Method: external   Beats/min: Fetal HR (beats/min): 156   Baseline: Fetal Heart Baseline Rate: normal range   Varibility: Fetal HR Variability: moderate (amplitude range 6 to 25 bpm)    Accels:     Decels:     Tracing Category:     NST-indications right upper quadrant pain-interpretation reactive, moderate bili, accelerations present, no decelerations noted, onset 1245 end time 1330 irregular contractions.  Uterine Assessment   Method: Method: external tocotransducer   Frequency (min):     Ctx Count in 10 min:     Duration:     Intensity:     Intensity by IUPC:     Resting Tone: Uterine Resting Tone: soft by palpation   Resting Tone by IUPC:     Cantonment Units:       Laboratory Results:   Lab Results (last 24 hours)     ** No results found for the last 24 hours. **        Radiology Review:   Imaging Results (last 24 hours)     ** No results found for the last 24 hours. **        Other Studies:    Assessment/Plan       Pregnancy        Assessment:  1.  Intrauterine pregnancy at 27w6d weeks gestation with reactive fetal status.    2.  Upper quadrant pain-gallbladder/liver   versus pregnancy related  3.  Viral syndrome  4.  History of transient hypertension    Plan:  1. OBS, IV hydration labs, consider PDC U/S  2. Plan of care has been reviewed with patient.  3.  Risks, benefits of treatment plan have been discussed.  4.  All questions have been answered.  5  D/W DR victor manuel Belcher,   4/23/2019  1:28 PM

## 2019-04-23 NOTE — NURSING NOTE
Pt requests to leave AMA. RN discussed risks to mom and babies if leaving AMA. Charge nurse updated.

## 2019-04-23 NOTE — PROGRESS NOTES
Talked briefly with patient and her family prior to moving to CT. Agree with plan and appreciate Dr. Belcher and Dr. Lyn's care.     Eli Rodriguez MD

## 2019-04-24 PROBLEM — O26.893 ABDOMINAL PAIN DURING PREGNANCY IN THIRD TRIMESTER: Status: ACTIVE | Noted: 2019-04-23

## 2019-04-24 PROBLEM — R10.9 ABDOMINAL PAIN DURING PREGNANCY IN THIRD TRIMESTER: Status: ACTIVE | Noted: 2019-04-23

## 2019-04-24 LAB
ALBUMIN SERPL-MCNC: 3.6 G/DL (ref 3.5–5.2)
ALBUMIN/GLOB SERPL: 1.2 G/DL
ALP SERPL-CCNC: 113 U/L (ref 39–117)
ALT SERPL W P-5'-P-CCNC: 17 U/L (ref 1–33)
AMPHET+METHAMPHET UR QL: NEGATIVE
AMPHETAMINES UR QL: NEGATIVE
ANION GAP SERPL CALCULATED.3IONS-SCNC: 12 MMOL/L
ANION GAP SERPL CALCULATED.3IONS-SCNC: 13 MMOL/L
AST SERPL-CCNC: 15 U/L (ref 1–32)
BARBITURATES UR QL SCN: NEGATIVE
BASOPHILS # BLD AUTO: 0.01 10*3/MM3 (ref 0–0.2)
BASOPHILS NFR BLD AUTO: 0.1 % (ref 0–1.5)
BENZODIAZ UR QL SCN: NEGATIVE
BILIRUB CONJ SERPL-MCNC: <0.2 MG/DL (ref 0.2–0.3)
BILIRUB SERPL-MCNC: 0.3 MG/DL (ref 0.2–1.2)
BUN BLD-MCNC: 3 MG/DL (ref 6–20)
BUN BLD-MCNC: 3 MG/DL (ref 6–20)
BUN/CREAT SERPL: 7.1 (ref 7–25)
BUN/CREAT SERPL: 7.7 (ref 7–25)
BUPRENORPHINE SERPL-MCNC: NEGATIVE NG/ML
CALCIUM SPEC-SCNC: 7.1 MG/DL (ref 8.6–10.5)
CALCIUM SPEC-SCNC: 7.4 MG/DL (ref 8.6–10.5)
CANNABINOIDS SERPL QL: NEGATIVE
CHLORIDE SERPL-SCNC: 102 MMOL/L (ref 98–107)
CHLORIDE SERPL-SCNC: 105 MMOL/L (ref 98–107)
CO2 SERPL-SCNC: 18 MMOL/L (ref 22–29)
CO2 SERPL-SCNC: 22 MMOL/L (ref 22–29)
COCAINE UR QL: NEGATIVE
CREAT BLD-MCNC: 0.39 MG/DL (ref 0.57–1)
CREAT BLD-MCNC: 0.42 MG/DL (ref 0.57–1)
DEPRECATED RDW RBC AUTO: 38.6 FL (ref 37–54)
DEPRECATED RDW RBC AUTO: 38.8 FL (ref 37–54)
EOSINOPHIL # BLD AUTO: 0 10*3/MM3 (ref 0–0.4)
EOSINOPHIL NFR BLD AUTO: 0 % (ref 0.3–6.2)
ERYTHROCYTE [DISTWIDTH] IN BLOOD BY AUTOMATED COUNT: 12.6 % (ref 12.3–15.4)
ERYTHROCYTE [DISTWIDTH] IN BLOOD BY AUTOMATED COUNT: 12.9 % (ref 12.3–15.4)
GFR SERPL CREATININE-BSD FRML MDRD: >150 ML/MIN/1.73
GLOBULIN UR ELPH-MCNC: 2.9 GM/DL
GLUCOSE BLD-MCNC: 108 MG/DL (ref 65–99)
GLUCOSE BLD-MCNC: 160 MG/DL (ref 65–99)
HCT VFR BLD AUTO: 32.5 % (ref 34–46.6)
HCT VFR BLD AUTO: 33.5 % (ref 34–46.6)
HGB BLD-MCNC: 10.9 G/DL (ref 12–15.9)
HGB BLD-MCNC: 11 G/DL (ref 12–15.9)
IMM GRANULOCYTES # BLD AUTO: 0.09 10*3/MM3 (ref 0–0.05)
IMM GRANULOCYTES NFR BLD AUTO: 0.5 % (ref 0–0.5)
LYMPHOCYTES # BLD AUTO: 1.17 10*3/MM3 (ref 0.7–3.1)
LYMPHOCYTES NFR BLD AUTO: 6.6 % (ref 19.6–45.3)
MCH RBC QN AUTO: 27.7 PG (ref 26.6–33)
MCH RBC QN AUTO: 28.1 PG (ref 26.6–33)
MCHC RBC AUTO-ENTMCNC: 32.8 G/DL (ref 31.5–35.7)
MCHC RBC AUTO-ENTMCNC: 33.5 G/DL (ref 31.5–35.7)
MCV RBC AUTO: 83.8 FL (ref 79–97)
MCV RBC AUTO: 84.4 FL (ref 79–97)
METHADONE UR QL SCN: NEGATIVE
MONOCYTES # BLD AUTO: 1.74 10*3/MM3 (ref 0.1–0.9)
MONOCYTES NFR BLD AUTO: 9.8 % (ref 5–12)
NEUTROPHILS # BLD AUTO: 14.81 10*3/MM3 (ref 1.7–7)
NEUTROPHILS NFR BLD AUTO: 83 % (ref 42.7–76)
OPIATES UR QL: NEGATIVE
OXYCODONE UR QL SCN: NEGATIVE
PCP UR QL SCN: NEGATIVE
PLATELET # BLD AUTO: 297 10*3/MM3 (ref 140–450)
PLATELET # BLD AUTO: 324 10*3/MM3 (ref 140–450)
PMV BLD AUTO: 10 FL (ref 6–12)
PMV BLD AUTO: 9.5 FL (ref 6–12)
POTASSIUM BLD-SCNC: 3.4 MMOL/L (ref 3.5–5.2)
POTASSIUM BLD-SCNC: 3.6 MMOL/L (ref 3.5–5.2)
PROPOXYPH UR QL: NEGATIVE
PROT SERPL-MCNC: 6.5 G/DL (ref 6–8.5)
RBC # BLD AUTO: 3.88 10*6/MM3 (ref 3.77–5.28)
RBC # BLD AUTO: 3.97 10*6/MM3 (ref 3.77–5.28)
SODIUM BLD-SCNC: 133 MMOL/L (ref 136–145)
SODIUM BLD-SCNC: 139 MMOL/L (ref 136–145)
TRICYCLICS UR QL SCN: NEGATIVE
WBC NRBC COR # BLD: 17.82 10*3/MM3 (ref 3.4–10.8)
WBC NRBC COR # BLD: 18.55 10*3/MM3 (ref 3.4–10.8)

## 2019-04-24 PROCEDURE — 25010000002 BUTORPHANOL PER 1 MG: Performed by: OBSTETRICS & GYNECOLOGY

## 2019-04-24 PROCEDURE — 96366 THER/PROPH/DIAG IV INF ADDON: CPT

## 2019-04-24 PROCEDURE — 96372 THER/PROPH/DIAG INJ SC/IM: CPT

## 2019-04-24 PROCEDURE — 82248 BILIRUBIN DIRECT: CPT | Performed by: OBSTETRICS & GYNECOLOGY

## 2019-04-24 PROCEDURE — 25010000002 PROMETHAZINE PER 50 MG: Performed by: OBSTETRICS & GYNECOLOGY

## 2019-04-24 PROCEDURE — 85027 COMPLETE CBC AUTOMATED: CPT | Performed by: OBSTETRICS & GYNECOLOGY

## 2019-04-24 PROCEDURE — 25010000002 MAGNESIUM SULFATE-LACT RINGERS 40 GM/580ML SOLUTION: Performed by: OBSTETRICS & GYNECOLOGY

## 2019-04-24 PROCEDURE — 25010000002 BETAMETHASONE ACET & SOD PHOS PER 4 MG: Performed by: OBSTETRICS & GYNECOLOGY

## 2019-04-24 PROCEDURE — 80053 COMPREHEN METABOLIC PANEL: CPT | Performed by: OBSTETRICS & GYNECOLOGY

## 2019-04-24 PROCEDURE — 93005 ELECTROCARDIOGRAM TRACING: CPT | Performed by: OBSTETRICS & GYNECOLOGY

## 2019-04-24 PROCEDURE — 99224 PR SBSQ OBSERVATION CARE/DAY 15 MINUTES: CPT | Performed by: OBSTETRICS & GYNECOLOGY

## 2019-04-24 PROCEDURE — 59025 FETAL NON-STRESS TEST: CPT

## 2019-04-24 PROCEDURE — 85025 COMPLETE CBC W/AUTO DIFF WBC: CPT | Performed by: NURSE PRACTITIONER

## 2019-04-24 PROCEDURE — 96375 TX/PRO/DX INJ NEW DRUG ADDON: CPT

## 2019-04-24 PROCEDURE — 99252 IP/OBS CONSLTJ NEW/EST SF 35: CPT | Performed by: NURSE PRACTITIONER

## 2019-04-24 PROCEDURE — G0378 HOSPITAL OBSERVATION PER HR: HCPCS

## 2019-04-24 PROCEDURE — 96361 HYDRATE IV INFUSION ADD-ON: CPT

## 2019-04-24 PROCEDURE — 96376 TX/PRO/DX INJ SAME DRUG ADON: CPT

## 2019-04-24 PROCEDURE — 93010 ELECTROCARDIOGRAM REPORT: CPT | Performed by: INTERNAL MEDICINE

## 2019-04-24 PROCEDURE — 25010000002 ONDANSETRON PER 1 MG: Performed by: NURSE PRACTITIONER

## 2019-04-24 RX ORDER — POTASSIUM CHLORIDE 750 MG/1
40 CAPSULE, EXTENDED RELEASE ORAL ONCE
Status: COMPLETED | OUTPATIENT
Start: 2019-04-24 | End: 2019-04-24

## 2019-04-24 RX ORDER — PANTOPRAZOLE SODIUM 40 MG/10ML
40 INJECTION, POWDER, LYOPHILIZED, FOR SOLUTION INTRAVENOUS
Status: DISCONTINUED | OUTPATIENT
Start: 2019-04-24 | End: 2019-04-25

## 2019-04-24 RX ORDER — ONDANSETRON 2 MG/ML
4 INJECTION INTRAMUSCULAR; INTRAVENOUS EVERY 6 HOURS SCHEDULED
Status: DISCONTINUED | OUTPATIENT
Start: 2019-04-24 | End: 2019-04-25

## 2019-04-24 RX ORDER — FAMOTIDINE 10 MG/ML
20 INJECTION, SOLUTION INTRAVENOUS EVERY 12 HOURS
Status: DISCONTINUED | OUTPATIENT
Start: 2019-04-24 | End: 2019-04-24

## 2019-04-24 RX ORDER — SODIUM CHLORIDE 9 MG/ML
125 INJECTION, SOLUTION INTRAVENOUS CONTINUOUS
Status: DISCONTINUED | OUTPATIENT
Start: 2019-04-24 | End: 2019-04-25

## 2019-04-24 RX ADMIN — ACETAMINOPHEN 1000 MG: 500 TABLET, FILM COATED ORAL at 18:52

## 2019-04-24 RX ADMIN — ONDANSETRON 4 MG: 2 SOLUTION INTRAMUSCULAR; INTRAVENOUS at 13:08

## 2019-04-24 RX ADMIN — PROMETHAZINE HYDROCHLORIDE 12.5 MG: 25 INJECTION, SOLUTION INTRAMUSCULAR; INTRAVENOUS at 20:34

## 2019-04-24 RX ADMIN — SODIUM CHLORIDE 125 ML/HR: 9 INJECTION, SOLUTION INTRAVENOUS at 13:07

## 2019-04-24 RX ADMIN — ONDANSETRON 4 MG: 2 SOLUTION INTRAMUSCULAR; INTRAVENOUS at 23:58

## 2019-04-24 RX ADMIN — ONDANSETRON 4 MG: 2 SOLUTION INTRAMUSCULAR; INTRAVENOUS at 17:46

## 2019-04-24 RX ADMIN — Medication 2 G/HR: at 06:10

## 2019-04-24 RX ADMIN — POTASSIUM CHLORIDE 40 MEQ: 750 CAPSULE, EXTENDED RELEASE ORAL at 16:29

## 2019-04-24 RX ADMIN — FAMOTIDINE 20 MG: 10 INJECTION, SOLUTION INTRAVENOUS at 08:21

## 2019-04-24 RX ADMIN — BETAMETHASONE SODIUM PHOSPHATE AND BETAMETHASONE ACETATE 12 MG: 3; 3 INJECTION, SUSPENSION INTRA-ARTICULAR; INTRALESIONAL; INTRAMUSCULAR at 20:32

## 2019-04-24 RX ADMIN — SODIUM CHLORIDE 125 ML/HR: 9 INJECTION, SOLUTION INTRAVENOUS at 20:42

## 2019-04-24 RX ADMIN — DEXTROSE AND SODIUM CHLORIDE 96 ML/HR: 5; 200 INJECTION, SOLUTION INTRAVENOUS at 06:10

## 2019-04-24 RX ADMIN — PANTOPRAZOLE SODIUM 40 MG: 40 INJECTION, POWDER, FOR SOLUTION INTRAVENOUS at 13:08

## 2019-04-24 NOTE — PROGRESS NOTES
"Patient was interviewed and examined.  She presented with acute right upper quadrant pain, that radiated to her back and right shoulder.  Pain has \"settled into my stomach\".  Pain is worse with deep inspiration.  Pain is reproducible on exam with palpation of the tight ribs and sternum. No further emesis today, and tolerating clears.    Suspect viral syndrome, especially given low-grade temperature today.  Will check right upper quadrant ultrasound tomorrow to assess for cholecystitis.  There is clearly a musculoskeletal component to her symptoms as pain is reproducible with palpation of the right lower ribs and sternum.  Small amount of hematemesis is likely related to Abbey-Sanchez tear.  Will give twice daily PPI to aid in M-W healing and treat possible esophagitis.  Plan to defer EGD unless symptoms worsen or persist.  "

## 2019-04-24 NOTE — PROGRESS NOTES
Sin Gaxiola  : 2000  MRN: [id number]  CSN: [id number]    Antepartum Progress Note    Subjective   Patient just had an episode of emesis with blood this morning. She is still reporting pain in the right upper quadrant area but has improved some she states compared to yesterday. Final CT report not back yet. Prelim per RN communication overnight with radiology staff is that the scan was overall normal. Reports FM. Afebrile overnight.      Objective     Min/max vitals past 24 hours:   Temp  Min: 98.2 °F (36.8 °C)  Max: 98.7 °F (37.1 °C)  BP  Min: 104/55  Max: 131/56  Pulse  Min: 96  Max: 138  Resp  Min: 15  Max: 20         General: well developed; well nourished  no acute distress   Appears tired and in slight pain   Heart: Tachycardic, regular rhythm    Lungs: breathing is unlabored  clear to auscultation bilaterally   Abdomen: no umbilical or inguinal hernias are present  no hepato-splenomegaly  mild TTP in right upper quadrant    FHT's: reactive and reassuring for GA   Cervix: was not checked.   Contractions: none               Assessment   1. IUP at 28w0d  2. Right upper quadrant pain - CT scan prelim report overall normal and MFM OB u/s yesterday without any occult concern for placental abruption. Differential to include nephrolithiasis, peptic ulcer   3. Leukocytosis - afebrile, could be due to recent betamethasone administration   4. Tachycardia - no respiratory or cardiac complaints, afebrile  5. Fetal VSD - will need follow up with PDC in next 4 week      Plan   1. Continue magnesium sulfate through steroid window  2. Complete  steroid course this evening for fetal lung maturity  3. Urine culture and repeat CMP, hepatic function panel.   4. Consult GI given hematemesis this morning  5. Continue IV pepcid BID  6. Continue to strain urine     Eli Rodriguez MD  2019  8:17 AM

## 2019-04-24 NOTE — PROGRESS NOTES
Appreciate GI recs. Discussed with patient's RN and no concern for contractions today. Will discontinue magnesium at this time as patient has received > 12 hours for neuroprotection. Can always restart if needed. Can do TID NSTs once off of magnesium.     Eli Rodriguez MD       Update at 1631  Patient reports compared to yesterday she does feel a little better. Pain is still present but closer now to epigastric region. Appreciate gi recs and reviewed plan of care with patient and mother. EKG ordered given tachycardia but it is improved. Will continue mIVFs.     Eli Rodriguez MD

## 2019-04-24 NOTE — CONSULTS
[facility]      Name:BARRY BASSETT : 2000 MRN# [id number]  Date of Service: 19   Admitted 2019 12:35 PM  LOS: 0 days Room N332/1  Reason for Consultation: abd pain and hematemesis  CC: abd pain and n/v   Assessment/Plan                                             ASSESSMENT & PLANS   19 y.o. female who is admitted on 2019 for Pregnancy [Z34.90].    Acute, RUQ abd pain r/t viral process vs biliary cause vs muscular skeletal  Intractable  N/V w/ 1 episode of hematemesis, which is possibly from Abbey-Sanchez tear.  Acute onset appears to be viral related  Leukocytosis.  WBC 13K yesterday 18.6 today.  Low grade fever 99.5  Dehydration (BUN 3 K 3.4 and Na 133)  · GB US in AM to r/o biliary cause, although LFT normal and pain not postprandial  · Schedule Zofran IV q 6 hrs instead of PRN.  OK to continue PRN Phenergan if ok w/ OB-GYN  · DC Pepcid.  Start Protonix 40 mg IV  · UDS to r/o cannabis hyperemesis  · Repeat H/H and BMP this PM  · IVF and electrolytes replacements per primary team  · Conservative mgt d/t pregnancy        Subjective                                                     SUBJECTIVE   HPI:Ms. Barry Bassett is a 19 y.o.pregnant female (28 wks) admitted for RUQ started 2 days ago.  Pain is constant. Worse w/ touching or taking deep breath.  Associated n/v.  Multiple episodes yesterday w/ clear/white emesis yesterday.  One episode of hematemesis around 8A this morning. Pt still has it in the emesis basin.  I moderate amount of w/ white/clear emesis w/ some blood steaks.  Denies recurrence of hematemesis since.  Still nausea controlled some w/ PRN Phenergan.    Denies NSAIDS or ETOH even before pregnancy.  Admits of smoking THC since age 12 up until pregnancy. UDS + THC in Dec 2018    Work up:  CT showed normal liver and GB.  No biliary dilation. There is prominence of the right renal collecting system and right ureter likely hydronephrosis of  pregnancy, but no evidence for obstructing renal stone. No acute appendicitis or inflammatory findings of the bowel.      LFT normal. WBC elevated. Low grade fever 99.5    ROS: Complete 14-system ROS performed & documented w/ pertinent findings included in HPI.  All other sys are negative.  Subjective   PAST MED HX: Pt has a past medical history of Seasonal allergies.  PAST SURG HX: Pt has a past surgical history that includes [place] tooth extraction (2011) and Foreign Body Removal (2013).  FAM HX: family history includes Breast cancer in her maternal aunt; Cancer in her mother; Colon cancer in her maternal uncle; Crohn's disease in her maternal aunt; Fibroids in her mother; Obesity in her mother; Ovarian cancer in her maternal grandmother.  SOC HX: Pt reports that she quit smoking about 5 months ago. Her smoking use included cigarettes. She has never used smokeless tobacco. She reports that she does not drink alcohol or use drugs.  Objective                                                           OBJECTIVE   Allergy: Pt has No Known Allergies.  Scheduled Meds  betamethasone acetate-betamethasone sodium phosphate 12 mg Intramuscular Q24H   famotidine 20 mg Intravenous BID   lactated ringers 1,000 mL Intravenous Once     Infusions  dextrose 5 % and sodium chloride 0.2 % 96 mL/hr Last Rate: 96 mL/hr (04/24/19 0610)   Magnesium Sulfate-Lact Ringers 2 g/hr Last Rate: 2 g/hr (04/24/19 0610)     PRN Meds•  acetaminophen  •  butorphanol  •  promethazine  Home Meds  Medications Prior to Admission   Medication Sig Dispense Refill Last Dose   • ondansetron (ZOFRAN) 4 MG tablet Take 1 tablet by mouth Every 8 (Eight) Hours As Needed for Nausea or Vomiting. 30 tablet 1 Past Month at Unknown time   • Prenatal Vit-Fe Fumarate-FA (PRENATAL VITAMIN PO) Take 2 tablets by mouth Daily.   4/23/2019 at Unknown time   • doxylamine (UNISOM) 25 MG tablet Take 1 tablet by mouth At Night As Needed for Nausea. 30 tablet 1 Not Taking   •  vitamin B-6 (PYRIDOXINE) 25 MG tablet Take 1 tablet by mouth Every 8 (Eight) Hours As Needed (nausea). 30 tablet 1 Not Taking     Results from last 7 days   Lab Units 04/24/19  0613 04/23/19  1320 04/17/19  1634   HEMOGLOBIN g/dL 11.0* 12.1 11.6*   HEMATOCRIT % 33.5* 36.1 36.9     Results from last 7 days   Lab Units 04/24/19  0912 04/24/19  0613 04/23/19  1320 04/17/19  1634   WBC 10*3/mm3  --  18.55* 12.96* 10.73   MCV fL  --  84.4 83.8 87.0   PLATELETS 10*3/mm3  --  324 288 311   BUN / CREAT RATIO  7.7  --  14.3  --    ANION GAP mmol/L 13.0  --  15.0  --      Results from last 7 days   Lab Units 04/24/19  0912 04/23/19  1320 04/17/19  1634   AST (SGOT) U/L 15 26  16 20   ALT (SGPT) U/L 17 16  19 27   ALK PHOS U/L 113 116  114 109   BILIRUBIN mg/dL 0.3 0.3  0.3 0.2   BILIRUBIN DIRECT mg/dL <0.2*  --   --    ALBUMIN g/dL 3.60 3.50  --    LIPASE U/L  --  17  --    AMYLASE U/L  --  70  --    BUN mg/dL 3* 6  --    CREATININE mg/dL 0.39* 0.42*  0.42* 0.59   EGFR IF NONAFRICN AM mL/min/1.73 >150 >150  --    SODIUM mmol/L 133* 141  --    POTASSIUM mmol/L 3.4* 4.1  --    CO2 mmol/L 18.0* 19.0*  --    GLUCOSE mg/dL 160* 108*  --      Temp  Min: 98.2 °F (36.8 °C)  Max: 99.5 °F (37.5 °C)   BP  Min: 104/55  Max: 131/56   Pulse  Min: 96  Max: 138   Resp  Min: 15  Max: 20   SpO2  Min: 99 %  Max: 99 %   There is no height or weight on file to calculate BMI.  Wt Readings from Last 5 Encounters:   04/17/19 83.6 kg (184 lb 3.2 oz)   03/20/19 80.9 kg (178 lb 6.4 oz)   02/20/19 84.8 kg (187 lb)   01/16/19 79.8 kg (176 lb)   12/12/18 80.3 kg (177 lb)     Physical Exam  General Fatigue appearing; no acute distress.   ENT Oral mucosa pink and dry without thrush or lesions.    Neck Neck supple; trachea midline. No thyromegaly   Resp CTA; no rhonchi, rales, or wheezes.  Respiration effort normal  CV RRR; normal S1, S2; no M/R/G. No lower extremity edema  GI Abd soft, mild/mod tenderness to light palpation RUQ , pregnant abd, normal  active bowel sounds.  No abd hernia  Skin No rash; no lesions; no bruises.  Skin turgor normal  Musc No clubbing; no cyanosis.  Moving all extremities  Psych Oriented to time, place, and person.  Appropriate affect    Allison Esteban APRN  [phone]

## 2019-04-25 ENCOUNTER — APPOINTMENT (OUTPATIENT)
Dept: ULTRASOUND IMAGING | Facility: HOSPITAL | Age: 19
End: 2019-04-25

## 2019-04-25 VITALS
DIASTOLIC BLOOD PRESSURE: 66 MMHG | OXYGEN SATURATION: 99 % | RESPIRATION RATE: 16 BRPM | HEART RATE: 111 BPM | TEMPERATURE: 97.4 F | SYSTOLIC BLOOD PRESSURE: 103 MMHG

## 2019-04-25 PROBLEM — R11.2 INTRACTABLE VOMITING WITH NAUSEA: Status: ACTIVE | Noted: 2019-04-25

## 2019-04-25 PROBLEM — R10.11 RIGHT UPPER QUADRANT PAIN: Status: ACTIVE | Noted: 2019-04-25

## 2019-04-25 PROCEDURE — 96376 TX/PRO/DX INJ SAME DRUG ADON: CPT

## 2019-04-25 PROCEDURE — 59025 FETAL NON-STRESS TEST: CPT

## 2019-04-25 PROCEDURE — 25010000002 ONDANSETRON PER 1 MG: Performed by: NURSE PRACTITIONER

## 2019-04-25 PROCEDURE — 99217 PR OBSERVATION CARE DISCHARGE MANAGEMENT: CPT | Performed by: OBSTETRICS & GYNECOLOGY

## 2019-04-25 PROCEDURE — 96361 HYDRATE IV INFUSION ADD-ON: CPT

## 2019-04-25 PROCEDURE — G0378 HOSPITAL OBSERVATION PER HR: HCPCS

## 2019-04-25 PROCEDURE — 76705 ECHO EXAM OF ABDOMEN: CPT

## 2019-04-25 RX ORDER — PROMETHAZINE HYDROCHLORIDE 12.5 MG/1
12.5 TABLET ORAL EVERY 6 HOURS PRN
Qty: 30 TABLET | Refills: 1 | Status: ON HOLD | OUTPATIENT
Start: 2019-04-25 | End: 2019-07-03

## 2019-04-25 RX ORDER — ONDANSETRON 4 MG/1
4 TABLET, FILM COATED ORAL EVERY 6 HOURS PRN
Status: DISCONTINUED | OUTPATIENT
Start: 2019-04-25 | End: 2019-04-25 | Stop reason: HOSPADM

## 2019-04-25 RX ORDER — ONDANSETRON 4 MG/1
4 TABLET, FILM COATED ORAL EVERY 6 HOURS PRN
Qty: 30 TABLET | Refills: 1 | Status: ON HOLD | OUTPATIENT
Start: 2019-04-25 | End: 2019-07-03

## 2019-04-25 RX ORDER — PANTOPRAZOLE SODIUM 40 MG/1
40 TABLET, DELAYED RELEASE ORAL
Status: DISCONTINUED | OUTPATIENT
Start: 2019-04-26 | End: 2019-04-25 | Stop reason: HOSPADM

## 2019-04-25 RX ORDER — PANTOPRAZOLE SODIUM 40 MG/1
40 TABLET, DELAYED RELEASE ORAL DAILY
Qty: 30 TABLET | Refills: 5 | Status: ON HOLD | OUTPATIENT
Start: 2019-04-25 | End: 2019-07-02

## 2019-04-25 RX ORDER — PROMETHAZINE HYDROCHLORIDE 12.5 MG/1
12.5 TABLET ORAL EVERY 6 HOURS PRN
Status: DISCONTINUED | OUTPATIENT
Start: 2019-04-25 | End: 2019-04-25 | Stop reason: HOSPADM

## 2019-04-25 RX ORDER — PNV NO.95/FERROUS FUM/FOLIC AC 28MG-0.8MG
1 TABLET ORAL DAILY
Qty: 30 TABLET | Refills: 4 | Status: SHIPPED | OUTPATIENT
Start: 2019-04-25 | End: 2020-02-18

## 2019-04-25 RX ADMIN — PANTOPRAZOLE SODIUM 40 MG: 40 INJECTION, POWDER, FOR SOLUTION INTRAVENOUS at 05:55

## 2019-04-25 RX ADMIN — ONDANSETRON 4 MG: 2 SOLUTION INTRAMUSCULAR; INTRAVENOUS at 12:43

## 2019-04-25 RX ADMIN — ONDANSETRON 4 MG: 2 SOLUTION INTRAMUSCULAR; INTRAVENOUS at 05:54

## 2019-04-25 RX ADMIN — ACETAMINOPHEN 1000 MG: 500 TABLET, FILM COATED ORAL at 08:10

## 2019-04-25 RX ADMIN — SODIUM CHLORIDE 125 ML/HR: 9 INJECTION, SOLUTION INTRAVENOUS at 04:23

## 2019-04-25 NOTE — PROGRESS NOTES
Sin Gaxiola  : 2000  MRN: [id number]  CSN: [id number]    Antepartum Progress Note    Subjective   She just had her RUQ ultrasound this morning. She reports feeling better this morning. Pain is improved. No emesis. She is going to try and eat something this morning. Reports she got some good rest overnight. Reports FM. No contractions.      Objective     Min/max vitals past 24 hours:   Temp  Min: 98.1 °F (36.7 °C)  Max: 99.5 °F (37.5 °C)  BP  Min: 102/63  Max: 119/69  Pulse  Min: 111  Max: 125  Resp  Min: 16  Max: 18         General: well developed; well nourished  no acute distress   Heart: Not performed.   Lungs: breathing is unlabored   Abdomen: no umbilical or inguinal hernias are present  no hepato-splenomegaly  reproducible pain along lower ribs on right side and sternum   FHT's: reassuring and category 1   Cervix: was not checked.   Contractions: none               Assessment   1. IUP at 28w1d  2. RUQ pain - improved. Suspect viral gastritis versus component of musculoskeletal pain as well.   3. Tachycardia - EKG sinus tachycardia, improving   4. Fetal VSD - will need follow up with [facility] in next 4 weeks.      Plan   1. Follow up RUQ ultrasound results  2. If able to tolerate food this morning will attempt to transition to all oral medication this afternoon.   3. Appreciate GI recommendations.     Eli Rodriguez MD  2019  8:38 AM

## 2019-04-25 NOTE — PROGRESS NOTES
IMPRESSION:  Mild dilatation identified of the right renal pelvis. There  is sludge seen within the gallbladder with no evidence of wall  thickening or biliary ductal dilatation.    RUQ u/s reviewed  Patient feeling much better  Will discontinue IV medications   If able to tolerate food and oral meds this afternoon and GI agrees, would plan for discharge late afternoon/early evening.   All questions answered.   Follow up next week in the office.     Eli Rodriguez MD

## 2019-04-25 NOTE — PAYOR COMM NOTE
Minor Barry Jaspreet (19 y.o. Female)     [employer] ID#[id number]    Inpatient status 4/25/19    To: Estella    From: Deena Nagel  #[phone]  Fax#[phone]      Date of Birth Social Security Number Address Home Phone MRN    2000  [id number] [address] [phone] [id number]    Mandaen Marital Status          None Single       Admission Date Admission Type Admitting Provider Attending Provider Department, Room/Bed    4/23/19 Elective Eli Rodriguez MD Baylon, Mary Beth, MD [facility] ANTEPARTUM, N332/1    Discharge Date Discharge Disposition Discharge Destination                       Attending Provider:  Eli Rodriguez MD    Allergies:  No Known Allergies    Isolation:  None   Infection:  None   Code Status:  CPR    Ht:  --   Wt:  83.6 kg (184 lb 3.2 oz)    Admission Cmt:  None   Principal Problem:  None                Active Insurance as of 4/23/2019     Primary Coverage     Payor Plan Insurance Group Employer/Plan Group    [employer] [place] [employer] [place]      Payor Plan Address Payor Plan Phone Number Payor Plan Fax Number Effective Dates    [address] [address] [phone]  12/11/2018 - None Entered    [facility] [id number]       Subscriber Name Subscriber Birth Date Member ID       BARRY BASSETT 2000 [id number]                 Emergency Contacts      (Rel.) Home Phone Work Phone Mobile Phone    Yazmin Charles (Mother) [phone] -- --            Insurance Information                [facility] MEDICAID Phone: [phone]    Subscriber: Barry Bassett Subscriber#: [id number]    Group#:  Precert#:           Problem List           Codes Noted - Resolved       Hospital    Right upper quadrant pain ICD-10-CM: R10.11  ICD-9-CM: 789.01 4/25/2019 - Present    Abdominal pain during pregnancy in third trimester ICD-10-CM: O26.893, R10.9  ICD-9-CM: 646.83, 789.00 4/23/2019 - Present        "Non-Hospital    Elevated blood pressure affecting pregnancy, antepartum ICD-10-CM: O16.9  ICD-9-CM: 642.33 2019 - Present    Supervision of normal first pregnancy ICD-10-CM: Z34.00  ICD-9-CM: V22.0 2018 - Present             History & Physical      Richar Belcher, DO at 2019  1:28 PM          \[facility]  Obstetric History and Physical    Referring Provider: Eli Rodriguez MD      Chief Complaint   Patient presents with   • right sided pain     decreased fm \" i felt the baby move last night \" \" I am having pain in my right shoulder and back \"       Subjective     Patient is a 18 y.o. female  currently at 27w6d, who presents with c/o upper abdominal pain.  She reports sharp transient right upper quadrant pain that began at 10:00 this morning.  Patient denies any other associated symptoms including nausea, vomiting, recent trauma, fever, leaking of fluid, vaginal bleeding, and regular uterine activity.  Patient reports normal fetal activity.  Prenatal care by Dr. Rodriguez.  Patient recently had a mildly elevated blood pressure and complete a 24 urine protein which was reported as normal and normal pre-eclamptic panel.    .    The following portions of the patients history were reviewed and updated as appropriate: current medications, allergies, past medical history, past surgical history, past family history, past social history and problem list .       Prenatal Information:   Maternal Prenatal Labs  Blood Type No results found for: ABO   Rh Status No results found for: RH   Antibody Screen No results found for: ABSCRN   Gonnorhea No results found for: GCCX   Chlamydia No results found for: CLAMYDCU   RPR No results found for: RPR   Syphilis Antibody No results found for: SYPHILIS   Rubella No results found for: RUBELLAIGGIN   Hepatitis B Surface Antigen No results found for: HEPBSAG   HIV-1 Antibody No results found for: LABHIV1   Hepatitis C Antibody No results found for: HEPCAB   Rapid " Urin Drug Screen No results found for: AMPMETHU, BARBITSCNUR, LABBENZSCN, LABMETHSCN, LABOPIASCN, THCURSCR, COCAINEUR, AMPHETSCREEN, PROPOXSCN, BUPRENORSCNU, METAMPSCNUR, OXYCODONESCN, TRICYCLICSCN   Group B Strep Culture No results found for: GBSANTIGEN           External Prenatal Results     Pregnancy Outside Results - Transcribed From Office Records - See Scanned Records For Details     Test Value Date Time    Hgb 11.6 g/dL 19 1634    Hct 36.9 % 19 1634    ABO O  18 1151    Rh Positive  18 1151    Antibody Screen Negative  18 1151    Glucose Fasting GTT       Glucose Tolerance Test 1 hour       Glucose Tolerance Test 3 hour       Gonorrhea (discrete)       Chlamydia (discrete)       RPR Non-Reactive  18 1151    VDRL       Syphilis Antibody       Rubella 251.7 IU/mL 18 1151      Immune  18 1151    HBsAg Non-Reactive  18 1151    Herpes Simplex Virus PCR       Herpes Simplex VIrus Culture       HIV Non-Reactive  18 1151    Hep C RNA Quant PCR       Hep C Antibody Non-Reactive  18 1151    AFP       Group B Strep       GBS Susceptibility to Clindamycin       GBS Susceptibility to Erythromycin       Fetal Fibronectin       Genetic Testing, Maternal Blood             Drug Screening     Test Value Date Time    Urine Drug Screen       Amphetamine Screen Negative  18 1718    Barbiturate Screen Negative  18 1718    Benzodiazepine Screen Negative  18 1718    Methadone Screen Negative  18 1718    Phencyclidine Screen Negative  18 1718    Opiates Screen Negative  18 1718    THC Screen Positive  18 1718    Cocaine Screen       Propoxyphene Screen Negative  18 1718    Buprenorphine Screen Negative  18 1718    Methamphetamine Screen       Oxycodone Screen Negative  18 1718    Tricyclic Antidepressants Screen Negative  18 1718                  Past OB History:       Obstetric History       T0       L0     SAB0   TAB0   Ectopic0   Molar0   Multiple0   Live Births0       # Outcome Date GA Lbr Aftab/2nd Weight Sex Delivery Anes PTL Lv   1 Current               Obstetric Comments   Denies history of STIs       Past Medical History: Past Medical History:   Diagnosis Date   • Seasonal allergies       Past Surgical History Past Surgical History:   Procedure Laterality Date   • FOREIGN BODY REMOVAL      glass removed from foot   • WISDOM TOOTH EXTRACTION        Family History: Family History   Problem Relation Age of Onset   • Cancer Mother    • Obesity Mother    • Fibroids Mother    • Ovarian cancer Maternal Grandmother    • Breast cancer Maternal Aunt         older than 40s   • Crohn's disease Maternal Aunt    • Colon cancer Maternal Uncle       Social History:  reports that she quit smoking about 5 months ago. Her smoking use included cigarettes. She has never used smokeless tobacco.   reports that she does not drink alcohol.   reports that she does not use drugs.                   General ROS Negative Findings:Headaches, Visual Changes, Epigastric pain, Anorexia, Nausia/Vomiting, ROM and Vaginal Bleeding    ROS     All other systems have been reviewed and are neg  Objective       Vital Signs Range for the last 24 hours  Temperature: Temp:  [98.4 °F (36.9 °C)] 98.4 °F (36.9 °C)   Temp Source: Temp src: Oral   BP: BP: (128)/(72) 128/72   Pulse: Heart Rate:  [102] 102   Respirations: Resp:  [18] 18   SPO2:     O2 Amount (l/min):     O2 Devices     Weight:       Physical Examination:   General:   alert, appears stated age and cooperative   Skin:   normal   HEENT:  Sclera clear   Lungs:   clear to auscultation bilaterally   Heart:   regular rate and rhythm, S1, S2 normal, no murmur, click, rub or gallop   Abdomen:  Soft, bowel sounds present, no guarding, rebound, benign exam negative CVA tenderness   Lower Extremeties  no edema, no calf tenderness   Pelvis:  Exam deferred.         Presentation:     Cervix: Exam by:     Dilation:     Effacement:     Station:         Fetal Heart Rate Assessment   Method: Fetal HR Assessment Method: external   Beats/min: Fetal HR (beats/min): 156   Baseline: Fetal Heart Baseline Rate: normal range   Varibility: Fetal HR Variability: moderate (amplitude range 6 to 25 bpm)   Accels:     Decels:     Tracing Category:     NST-indications right upper quadrant pain-interpretation reactive, moderate bili, accelerations present, no decelerations noted, onset 1245 end time 1330 irregular contractions.  Uterine Assessment   Method: Method: external tocotransducer   Frequency (min):     Ctx Count in 10 min:     Duration:     Intensity:     Intensity by IUPC:     Resting Tone: Uterine Resting Tone: soft by palpation   Resting Tone by IUPC:     Olyphant Units:       Laboratory Results:   Lab Results (last 24 hours)     ** No results found for the last 24 hours. **        Radiology Review:   Imaging Results (last 24 hours)     ** No results found for the last 24 hours. **        Other Studies:    Assessment/Plan       Pregnancy        Assessment:  1.  Intrauterine pregnancy at 27w6d weeks gestation with reactive fetal status.    2.  Upper quadrant pain-gallbladder/liver   versus pregnancy related  3.  Viral syndrome  4.  History of transient hypertension    Plan:  1. OBS, IV hydration labs, consider PDC U/S  2. Plan of care has been reviewed with patient.  3.  Risks, benefits of treatment plan have been discussed.  4.  All questions have been answered.  5  D/W DR victor manuel Belcher DO  4/23/2019  1:28 PM    Electronically signed by Richar Belcher DO at 4/23/2019  3:09 PM       ICU Vital Signs     Row Name 04/25/19 0815 04/25/19 0324 04/24/19 2350 04/24/19 1919 04/24/19 1745       Vitals    Temp  98.4 °F (36.9 °C)  98.4 °F (36.9 °C)  98.1 °F (36.7 °C)  99 °F (37.2 °C)  98.8 °F (37.1 °C)    Temp src  Oral  Oral  Oral  Oral  Oral    Pulse  115  118  111  121  (Abnormal)    120    Heart Rate Source  Monitor  Monitor  Monitor  Monitor  Monitor    Resp  16 16  18  --  16    Resp Rate Source  Visual  Visual  Visual  --  Visual    BP  104/61  107/66  115/73  102/63  103/60    Noninvasive MAP (mmHg)  76  --  --  --  --    BP Location  Left arm  Left arm  Left arm  --  --    BP Method  Automatic  Automatic  Automatic  --  --    Patient Position  Lying  Lying  Lying  --  --    Row Name 04/24/19 1445 04/24/19 1314 04/24/19 1135 04/24/19 1043 04/24/19 0947       Vitals    Temp  98.6 °F (37 °C)  98.1 °F (36.7 °C)  98.4 °F (36.9 °C)  98.3 °F (36.8 °C)  99.5 °F (37.5 °C)    Temp src  Oral  Oral  Oral  Oral  Oral    Pulse  118  125  (Abnormal)   125  (Abnormal)   122  (Abnormal)   122  (Abnormal)     Heart Rate Source  Monitor  Monitor  Monitor  Monitor  Monitor    Resp  16  16  16  16  16    Resp Rate Source  Visual  Visual  Visual  Visual  Visual    BP  110/60  111/74  106/59  119/69  109/64    Noninvasive MAP (mmHg)  --  --  76  88  81    BP Location  --  --  Left arm  Left arm  Left arm    BP Method  --  --  Automatic  Automatic  Automatic    Patient Position  --  --  Lying  Lying  Lying    Row Name 04/24/19 0757 04/24/19 0700 04/24/19 0611 04/24/19 0458 04/24/19 0413       Vitals    Temp  98.2 °F (36.8 °C)  98.2 °F (36.8 °C)  --  --  98.7 °F (37.1 °C)    Temp src  Oral  Oral  --  --  Oral    Pulse  136  (Abnormal)   133  (Abnormal)   129  (Abnormal)   130  (Abnormal)   136  (Abnormal)     Heart Rate Source  Monitor  --  --  --  Monitor    Resp  16  16  16  16  15    Resp Rate Source  Stethoscope  --  --  --  --    BP  126/76  123/68  111/58  113/59  104/55    Noninvasive MAP (mmHg)  95  --  --  --  --    BP Location  Left arm  Left arm  --  --  Left arm    BP Method  Automatic  Automatic  --  --  Automatic    Patient Position  Sitting  Lying  --  --  Lying       Patient Observation    Observations  --  Trasnferred to APU  --  Pt assisted up to BR  --    Row Name 04/24/19 0255 04/24/19 0148  04/24/19 0020 04/24/19 0008 04/23/19 2346       Vitals    Temp  --  --  --  --  98.6 °F (37 °C)    Temp src  --  --  --  --  Oral    Pulse  131  (Abnormal)   130  (Abnormal)   126  (Abnormal)   --  130  (Abnormal)     Heart Rate Source  --  --  --  --  Radial    Resp  16  15  16  --  16    Resp Rate Source  --  --  --  --  Visual    BP  127/68  126/55  116/56  --  108/59    BP Location  --  --  --  --  Left arm    BP Method  --  --  --  --  Automatic    Patient Position  --  --  --  --  Lying       Patient Observation    Observations  Pt assisted up to BR  --  --  Pt assisted up to BR  --    Row Name 04/23/19 2242 04/23/19 2117 04/23/19 2032 04/23/19 1909 04/23/19 1813       Vitals    Temp  --  --  98.7 °F (37.1 °C)  --  --    Temp src  --  --  Oral  --  --    Pulse  128  (Abnormal)   131  (Abnormal)   130  (Abnormal)   126  (Abnormal)   127  (Abnormal)     Resp  16  16  16  16  --    Resp Rate Source  --  --  --  Visual  --    BP  131/56  130/78  114/74  108/66  107/59    Row Name 04/23/19 1811 04/23/19 1725 04/23/19 1723 04/23/19 1722 04/23/19 1718       Vitals    Temp  --  98.3 °F (36.8 °C)  --  --  --    Temp src  --  Axillary  --  --  --    Pulse  138  (Abnormal)   114  109  112  105    Heart Rate Source  --  Monitor  --  --  --    Resp  16  16  --  16  --    Resp Rate Source  Visual  Visual  --  Visual  --    BP  --  121/71  --  116/69  --    BP Location  --  Left arm  --  --  --    BP Method  --  Automatic  --  --  --    Patient Position  --  Lying  --  --  --    Row Name 04/23/19 1717 04/23/19 1713 04/23/19 1712 04/23/19 1708 04/23/19 1706       Vitals    Temp  --  --  --  --  98.5 °F (36.9 °C)    Temp src  --  --  --  --  Oral    Pulse  110  124  (Abnormal)   111  112  114    Resp  18  --  18  --  18    Resp Rate Source  Visual  --  Visual  --  Visual    BP  116/70  --  116/70  --  119/73    BP Location  --  --  --  --  Left arm    BP Method  --  --  --  --  Automatic    Patient Position  --  --  --  --   Lying       Patient Observation    Observations  --  --  --  --  Mag bolus    Row Name 04/23/19 1703 04/23/19 1649 04/23/19 1527 04/23/19 1439 04/23/19 1430       Vitals    Temp  --  --  98.4 °F (36.9 °C)  --  --    Temp src  --  --  Oral  --  --    Pulse  121  (Abnormal)   111  --  --  106    BP  --  124/78  --  --  --       Oxygen Therapy    SpO2  --  --  --  --  99 %       Patient Observation    Observations  --  --  --  pt sweaty and hurting with right upper quad pain   new orders for IV start and hydration with nausea meds. will continue to observe pt and her pain     Row Name 04/23/19 1428 04/23/19 1418 04/23/19 1410 04/23/19 1309 04/23/19 1253       Vitals    Pulse  --  --  --  96  102    Resp  --  --  --  20  --    BP  --  --  --  123/84  128/72       Patient Observation    Observations  Dr Skelton at bedside discussing poc with pt.  informed of pt now reports dizziness and nausea   pt sitting up in bed breathing through pain , Dr skelton called on his phone and ask to come back to bedside to see pt again now that labs are back   Dr Skelton informed of pt's increased pain  in her right upper quadrant and right lower back. new orders recieved to give pt 1000mg of tylenol and have her walk around.  labs are pending   --  --    Row Name 04/23/19 1251                   Vitals    Temp  98.4 °F (36.9 °C)        Temp src  Oral        Pulse  102        Resp  18        Resp Rate Source  Visual        BP  128/72        BP Location  Right arm        BP Method  Automatic        Patient Position  Lying            Hospital Medications (active)       Dose Frequency Start End    acetaminophen (TYLENOL) tablet 1,000 mg 1,000 mg Every 4 Hours PRN 4/23/2019     Sig - Route: Take 2 tablets by mouth Every 4 (Four) Hours As Needed for Mild Pain . - Oral    betamethasone acetate-betamethasone sodium phosphate (CELESTONE SOLUSPAN) injection 12 mg 12 mg Every 24 Hours 4/23/2019 4/24/2019    Sig - Route: Inject 2 mL into the  appropriate muscle as directed by prescriber Daily. - Intramuscular    butorphanol (STADOL) injection 2 mg 2 mg Every 4 Hours PRN 4/23/2019     Sig - Route: Infuse 1 mL into a venous catheter Every 4 (Four) Hours As Needed for Severe Pain . - Intravenous    lactated ringers infusion 1,000 mL 1,000 mL Once 4/23/2019     Sig - Route: Infuse 1,000 mL into a venous catheter 1 (One) Time. - Intravenous    ondansetron (ZOFRAN) injection 4 mg 4 mg Every 6 Hours Scheduled 4/24/2019     Sig - Route: Infuse 2 mL into a venous catheter Every 6 (Six) Hours. - Intravenous    pantoprazole (PROTONIX) injection 40 mg 40 mg Every Early Morning 4/24/2019     Sig - Route: Infuse 10 mL into a venous catheter Every Morning. - Intravenous    potassium chloride (MICRO-K) CR capsule 40 mEq 40 mEq Once 4/24/2019 4/24/2019    Sig - Route: Take 4 capsules by mouth 1 (One) Time. - Oral    promethazine (PHENERGAN) injection 12.5 mg 12.5 mg Every 6 Hours PRN 4/23/2019     Sig - Route: Infuse 0.5 mL into a venous catheter Every 6 (Six) Hours As Needed for Nausea or Vomiting. - Intravenous    sodium chloride 0.9 % infusion 125 mL/hr Continuous 4/24/2019     Sig - Route: Infuse 125 mL/hr into a venous catheter Continuous. - Intravenous    dextrose 5 % and sodium chloride 0.2 % infusion (Discontinued) 96 mL/hr Continuous 4/23/2019 4/24/2019    Sig - Route: Infuse 96 mL/hr into a venous catheter Continuous. - Intravenous    famotidine (PEPCID) injection 20 mg (Discontinued) 20 mg 2 Times Daily 4/23/2019 4/24/2019    Sig - Route: Infuse 2 mL into a venous catheter 2 (Two) Times a Day. - Intravenous    Magnesium Sulfate-Lact Ringers 40 GM/580ML (Discontinued) 2 g/hr Continuous 4/23/2019 4/24/2019    Sig - Route: Infuse 2 g/hr into a venous catheter Continuous. - Intravenous          Lab Results (last 72 hours)     Procedure Component Value Units Date/Time    Basic Metabolic Panel [[id number]]  (Abnormal) Collected:  04/24/19 7119    Specimen:  Blood  Updated:  04/24/19 1749     Glucose 108 mg/dL      BUN 3 mg/dL      Creatinine 0.42 mg/dL      Sodium 139 mmol/L      Potassium 3.6 mmol/L      Chloride 105 mmol/L      CO2 22.0 mmol/L      Calcium 7.1 mg/dL      eGFR  African Amer >150 mL/min/1.73      eGFR Non African Amer >150 mL/min/1.73      BUN/Creatinine Ratio 7.1     Anion Gap 12.0 mmol/L     Narrative:       GFR Normal >60  Chronic Kidney Disease <60  Kidney Failure <15    CBC & Differential [[id number]] Collected:  04/24/19 1709    Specimen:  Blood Updated:  04/24/19 1723    Narrative:       The following orders were created for panel order CBC & Differential.  Procedure                               Abnormality         Status                     ---------                               -----------         ------                     CBC Auto Differential[[id number]]        Abnormal            Final result                 Please view results for these tests on the individual orders.    CBC Auto Differential [[id number]]  (Abnormal) Collected:  04/24/19 1709    Specimen:  Blood Updated:  04/24/19 1723     WBC 17.82 10*3/mm3      RBC 3.88 10*6/mm3      Hemoglobin 10.9 g/dL      Hematocrit 32.5 %      MCV 83.8 fL      MCH 28.1 pg      MCHC 33.5 g/dL      RDW 12.9 %      RDW-SD 38.8 fl      MPV 9.5 fL      Platelets 297 10*3/mm3      Neutrophil % 83.0 %      Lymphocyte % 6.6 %      Monocyte % 9.8 %      Eosinophil % 0.0 %      Basophil % 0.1 %      Immature Grans % 0.5 %      Neutrophils, Absolute 14.81 10*3/mm3      Lymphocytes, Absolute 1.17 10*3/mm3      Monocytes, Absolute 1.74 10*3/mm3      Eosinophils, Absolute 0.00 10*3/mm3      Basophils, Absolute 0.01 10*3/mm3      Immature Grans, Absolute 0.09 10*3/mm3     Urine Culture - Urine, Urine, Clean Catch [[id number]] Collected:  04/23/19 1754    Specimen:  Urine, Clean Catch Updated:  04/24/19 1224    Urine Drug Screen - Urine, Clean Catch [[id number]]  (Normal) Collected:  04/23/19 1756    Specimen:  Urine, Clean  Catch Updated:  04/24/19 1215     THC, Screen, Urine Negative     Phencyclidine (PCP), Urine Negative     Cocaine Screen, Urine Negative     Methamphetamine, Ur Negative     Opiate Screen Negative     Amphetamine Screen, Urine Negative     Benzodiazepine Screen, Urine Negative     Tricyclic Antidepressants Screen Negative     Methadone Screen, Urine Negative     Barbiturates Screen, Urine Negative     Oxycodone Screen, Urine Negative     Propoxyphene Screen Negative     Buprenorphine, Screen, Urine Negative    Narrative:       Cutoff For Drugs Screened:    Amphetamines               500 ng/ml  Barbiturates               200 ng/ml  Benzodiazepines            150 ng/ml  Cocaine                    150 ng/ml  Methadone                  200 ng/ml  Opiates                    100 ng/ml  Phencyclidine               25 ng/ml  THC                            50 ng/ml  Methamphetamine            500 ng/ml  Tricyclic Antidepressants  300 ng/ml  Oxycodone                  100 ng/ml  Propoxyphene               300 ng/ml  Buprenorphine               10 ng/ml    The normal value for all drugs tested is negative. This report includes unconfirmed screening results, with the cutoff values listed, to be used for medical treatment purposes only.  Unconfirmed results must not be used for non-medical purposes such as employment or legal testing.  Clinical consideration should be applied to any drug of abuse test, particularly when unconfirmed results are used.      Bilirubin, Direct [[id number]]  (Abnormal) Collected:  04/24/19 0912    Specimen:  Blood Updated:  04/24/19 1000     Bilirubin, Direct <0.2 mg/dL     Comprehensive Metabolic Panel [[id number]]  (Abnormal) Collected:  04/24/19 0912    Specimen:  Blood Updated:  04/24/19 0950     Glucose 160 mg/dL      BUN 3 mg/dL      Creatinine 0.39 mg/dL      Sodium 133 mmol/L      Potassium 3.4 mmol/L      Chloride 102 mmol/L      CO2 18.0 mmol/L      Calcium 7.4 mg/dL      Total Protein 6.5  g/dL      Albumin 3.60 g/dL      ALT (SGPT) 17 U/L      AST (SGOT) 15 U/L      Alkaline Phosphatase 113 U/L      Total Bilirubin 0.3 mg/dL      eGFR Non African Amer >150 mL/min/1.73      eGFR  African Amer >150 mL/min/1.73      Globulin 2.9 gm/dL      A/G Ratio 1.2 g/dL      BUN/Creatinine Ratio 7.7     Anion Gap 13.0 mmol/L     Narrative:       GFR Normal >60  Chronic Kidney Disease <60  Kidney Failure <15    SCANNED - LABS [[id number]] Resulted:  04/23/19      Updated:  04/24/19 0837    CBC (No Diff) [[id number]]  (Abnormal) Collected:  04/24/19 0613    Specimen:  Blood Updated:  04/24/19 0640     WBC 18.55 10*3/mm3      RBC 3.97 10*6/mm3      Hemoglobin 11.0 g/dL      Hematocrit 33.5 %      MCV 84.4 fL      MCH 27.7 pg      MCHC 32.8 g/dL      RDW 12.6 %      RDW-SD 38.6 fl      MPV 10.0 fL      Platelets 324 10*3/mm3     Urinalysis With Culture If Indicated - Urine, Clean Catch [[id number]]  (Abnormal) Collected:  04/23/19 1756    Specimen:  Urine, Clean Catch Updated:  04/23/19 1809     Color, UA Yellow     Appearance, UA Turbid     pH, UA 6.0     Specific Gravity, UA 1.016     Glucose, UA Negative     Ketones, UA Negative     Bilirubin, UA Negative     Blood, UA Negative     Protein, UA Negative     Leuk Esterase, UA Trace     Nitrite, UA Negative     Urobilinogen, UA 1.0 E.U./dL    Urinalysis, Microscopic Only - Urine, Clean Catch [[id number]]  (Abnormal) Collected:  04/23/19 1756    Specimen:  Urine, Clean Catch Updated:  04/23/19 1809     RBC, UA 0-2 /HPF      WBC, UA 3-5 /HPF      Bacteria, UA None Seen /HPF      Squamous Epithelial Cells, UA 3-6 /HPF      Hyaline Casts, UA 7-12 /LPF      Methodology Automated Microscopy    Comprehensive Metabolic Panel [[id number]]  (Abnormal) Collected:  04/23/19 1320    Specimen:  Blood Updated:  04/23/19 1641     Glucose 108 mg/dL      BUN 6 mg/dL      Creatinine 0.42 mg/dL      Sodium 141 mmol/L      Potassium 4.1 mmol/L      Chloride 107 mmol/L      CO2 19.0 mmol/L       Calcium 8.9 mg/dL      Total Protein 6.9 g/dL      Albumin 3.50 g/dL      ALT (SGPT) 16 U/L      AST (SGOT) 26 U/L      Alkaline Phosphatase 116 U/L      Total Bilirubin 0.3 mg/dL      eGFR Non African Amer >150 mL/min/1.73      eGFR  African Amer >150 mL/min/1.73      Globulin 3.4 gm/dL      A/G Ratio 1.0 g/dL      BUN/Creatinine Ratio 14.3     Anion Gap 15.0 mmol/L     Narrative:       GFR Normal >60  Chronic Kidney Disease <60  Kidney Failure <15    Amylase [[id number]]  (Normal) Collected:  04/23/19 1320    Specimen:  Blood Updated:  04/23/19 1527     Amylase 70 U/L     Lipase [[id number]]  (Normal) Collected:  04/23/19 1320    Specimen:  Blood Updated:  04/23/19 1527     Lipase 17 U/L     POC Glucose Once [[id number]]  (Normal) Collected:  04/23/19 1437    Specimen:  Blood Updated:  04/23/19 1448     Glucose 95 mg/dL     Preeclampsia Panel [[id number]]  (Abnormal) Collected:  04/23/19 1320    Specimen:  Blood Updated:  04/23/19 1407     Alkaline Phosphatase 114 U/L      ALT (SGPT) 19 U/L      AST (SGOT) 16 U/L      Creatinine 0.42 mg/dL      Total Bilirubin 0.3 mg/dL       U/L      Uric Acid 5.1 mg/dL     CBC (No Diff) [[id number]]  (Abnormal) Collected:  04/23/19 1320    Specimen:  Blood Updated:  04/23/19 1340     WBC 12.96 10*3/mm3      RBC 4.31 10*6/mm3      Hemoglobin 12.1 g/dL      Hematocrit 36.1 %      MCV 83.8 fL      MCH 28.1 pg      MCHC 33.5 g/dL      RDW 12.7 %      RDW-SD 38.5 fl      MPV 10.2 fL      Platelets 288 10*3/mm3           Imaging Results (last 72 hours)     Procedure Component Value Units Date/Time    US Gallbladder [[id number]] Collected:  04/25/19 0844     Updated:  04/25/19 0948    Narrative:       EXAMINATION: US GALLBLADDER- 04/25/2019      INDICATION: R11.2-Nausea with vomiting, unspecified; R10.10-Upper  abdominal pain, unspecified; right  upper abdominal pain     TECHNIQUE: Sonographic imaging was obtained of the right upper quadrant  in both the sagittal and transverse  "planes.     COMPARISON: NONE     FINDINGS: The pancreas is homogeneous in appearance with no focal mass  or abnormal fluid collection. The liver is homogeneous and normal in  echotexture and echogenicity with no focal mass or intrahepatic biliary  ductal dilatation. The gallbladder reveals sludge. There is no wall  thickening or biliary ductal dilatation. The right kidney measures in  length from pole to pole 13.3 cm. Slight prominence of the renal pelvis.  There is no solid mass or renal cortical cyst. No evidence of  nephrolithiasis.       Impression:       Mild dilatation identified of the right renal pelvis. There  is sludge seen within the gallbladder with no evidence of wall  thickening or biliary ductal dilatation.     D:  2019  E:  2019         This report was finalized on 2019 9:45 AM by Dr. Allison Valdovinos MD.       [facility] [[id number]] Collected:  19 1618     Updated:  19 1304    Narrative:       PAT NAME: BARRY BASSETT  MED REC#: [id number]  BIRTH DA: [birth year]  PAT GEND: F  ACCOUNT#: [id number]  PAT TYPE: O  EXAM LIVIA: [id number]  REF PHYS SANDRA SOTO  ACCESSION [id number]      Patient Status  ===========  Inpatient  Indication  ========  Severe Right Flank pain, Vomiting  History  ======  Previous Outcomes   1  Method  ======  Transabdominal ultrasound examination. View: Adequate view  Pregnancy  =========  Wiley pregnancy. Number of fetuses: 1.  Dating  ======  GA by \"stated dating\" 27 w + 6 d  CLARIBEL by \"stated dating\": 2019  Ultrasound examination on: 2019  GA by U/S based upon: AC, BPD, Femur, HC  GA by U/S 29 w + 1 d  CLARIBEL by U/S: 2019  Method of dating: Restore dating from previous exam  Previous dating: Dating performed on 2018, based on the LMP  Agreed CLARIBEL of previous datin2019  Assigned: Dating performed on 2018, based on the LMP  Assigned GA 27 w + 6 d  Assigned CLARIBEL: 2019  Fetal " Biometry  ============  Fetal Biometry  BPD 74.0 mm  29w 5d                                  90%  .3 mm  30w 1d                                  86%  Cerebellum tr 33.4 mm  29w 1d                                  86%  .9 mm  28w 3d                                  58%  Femur 53.7 mm  28w 3d                                  53%  HC / AC 1.14  EFW 1,262 g  28w 5d                                  64%  EFW (lb) 2 lb  EFW (oz) 13 oz  EFW by: Hadlock (BPD-HC-AC-FL)  Head / Face / Neck  Cav. septi pel. tr 8.2 mm   2.4 mm  CM 4.8 mm                                              7%  Nasal bone 9.4 mm  Extremities / Bony Struc  FL / BPD 0.73  FL / HC 0.20  FL / AC 0.22  Other Structures  MVP 6.4 cm   bpm  NITISH 16.6 cm  General Evaluation  ==============  Cardiac activity present.  bpm.  Fetal movements present.  Presentation cephalic.  Placenta anterior, Grade 1.  Umbilical cord Cord vessels: 3 vessel cord. Cord insertion: placental insertion: normal.  Amniotic fluid Amount of AF: normal. MVP 6.4 cm. NITISH 16.6 cm. Q1 3.3 cm, Q2 2.3 cm, Q3 4.6 cm, Q4 6.4 cm.  Fetal Anatomy  ===========  Cranium: Normal  Cranium: Cranial vault appears intact with normal head shape.  Brain: The intracranial contents appear normal including the cerebral ventricles, midline falx, choroid plexus, CSP, cisterna magna, cerebellum  and posterior fossa  Neck: Normal  Neck: No evidence of skin edema, increased thickness or masses seen  Lips: Normal  Lips: appear normal  Nose, eyes, maxilla and profile: appear normal  4-chamber view: ABNORMAL  4-chamber view: VSD noted  Cardiac location and axis: appears normal  Outflow tracts: left and right ventricular outflow tracts appear normal  Cardiac Rhythm: regular at normal rate  Diaphragm: appears intact  Thorax: No evidence of masses or effusion  Abdom. wall: Abdominal wall is intact, abdominal cord insertion appears normal  Stomach: left sided and appears normal  GI tract: liver  parenchyma and bowel appears normal. No evidence of ascites  Kidneys: renal parenchyma appears normal bilaterally with no evidence of renal pelvis dilation  Bladder: Normal size and wall thickness  Cervical spine: Normal  Cervical spine: normal  Thoracic spine: normal  Lumber spine: normal  Sacral spine: normal  Spine appears normal in two planes  Skeleton: No evidence of decreased calcification, fractures or bowing of the long bones  Rt arm: Normal  Rt arm: appears normal  Left arm: appears normal  Right leg: appears normal  Left leg: appears normal  Long bones are identified and appears normal in shape and position. Feet appears normal in structure and position  Rt hand: suboptimal  Lt hand: suboptimal  Lt hand: Due to fetal position  Gender: male  Wants to know gender: yes  Maternal Structures  ===============  Uterus and adnexa appears normal  Impression  =========  Nine day symmetric acceleration in growth from the previously assigned gestational age. Normal 4-chamber fetal cardiac view with normal right  and left ventricular outflow tracts; however, I am suspicious of a ventricular septal defect just beneath the AV valve insertions. Umbilical artery S/D  ratio = 2.18 to 1 (normal). Fetal breathing is noted. The amniotic fluid volume is normal with an NITISH = 16.6 cm. Limited evaluation of the maternal  right kidney notes moderate hydronephrosis. The placenta is anterior without obvious ultrasound evidence of abruption. Discussed findings with  the patient.  Recommendation  ==============  Would suggest magnesium sulfate for fetal neuroprotection and uterine tocolysis. Would also consider abdominal CT scan to rule out renal  stone, bowel obstruction or appendicitis. Repeat ultrasound at the [facility] in 4 weeks for further fetal cardiac evaluation.  Coding  ======  Description: 23418-02 Detailed Ultrasound      Sonographer: Anahi Alexis RDMS  Physician: Eric Lyn MD, FACOG    Electronically signed by: Eric Lyn,  MD, FACOG at: 2019/04/23 17:02    CT Abdomen Pelvis Without Contrast [[id number]] Collected:  04/24/19 0926     Updated:  04/24/19 1136    Narrative:       EXAMINATION: CT ABDOMEN AND PELVIS WO CONTRAST-      INDICATION: Kidney stone versus appendectomy.      TECHNIQUE: CT abdomen and pelvis without intravenous contrast  administration.     The radiation dose reduction device was turned on for each scan per the  ALARA (As Low as Reasonably Achievable) protocol.     COMPARISON: None.     FINDINGS: Lung bases demonstrate atelectasis right lower lung without  focal consolidation or significant effusion. Liver without focal lesion.  Gallbladder unremarkable. No biliary dilatation. Pancreas and spleen are  grossly unremarkable. Adrenals without distinct nodule. Kidneys  demonstrate prominence of the right renal collecting system and right  ureter likely hydronephrosis of pregnancy given the gravid uterus noted  in the pelvis, however, no evidence for nephrolithiasis or urolithiasis  to suggest obstructive ureteral stone or calculus present. No bulky  retroperitoneal adenopathy. Nonaneurysmal abdominal aorta. Left kidney  unremarkable. GI tract evaluation without focal thickening or  disproportionate dilatation of bowel. Appendix is not clearly  visualized, however, no inflammatory findings within the right lower  quadrant or pericecal region to suggest acute appendicitis or  inflammatory findings associated. No free fluid or loculated fluid  collection. Gravid uterus with minimally distended normal appearing  urinary bladder. No bulky pelvic adenopathy or free fluid. No aggressive  osseous or soft tissue body wall lesions.       Impression:       Gravid uterus with right hydronephrosis of pregnancy,  however, no evidence for obstructing renal stone. Appendix is not  visualized due to mass effect from the gravid uterus, however, no  inflammatory findings in the right lower quadrant to suggest acute  appendicitis or  inflammatory findings of the bowel. No loculated fluid  collection.     D:  04/23/2019  E:  04/24/2019     This report was finalized on 4/24/2019 11:33 AM by Dr. Say Rushing.             Orders (last 72 hrs)     Start     Ordered    04/25/19 0954  Inpatient Admission  Once      04/25/19 0954    04/25/19 0839  Code Status and Medical Interventions:  Continuous      04/25/19 0838    04/25/19 0813  Diet Regular  Diet Effective Now      04/25/19 0812    04/25/19 0001  NPO Diet  Diet Effective Midnight,   Status:  Canceled     Comments:  For RUQ u/s in the am    04/24/19 2330    04/25/19 0000  US Gallbladder  1 Time Imaging      04/24/19 1246    04/24/19 1800  Fetal Monitoring  3 Times Daily     Comments:  NST once off of mag    04/24/19 1446    04/24/19 1700  Basic Metabolic Panel  Once      04/24/19 1247    04/24/19 1700  CBC & Differential  Once      04/24/19 1247    04/24/19 1700  CBC Auto Differential  PROCEDURE ONCE      04/24/19 1247    04/24/19 1631  ECG 12 Lead  STAT      04/24/19 1630    04/24/19 1545  potassium chloride (MICRO-K) CR capsule 40 mEq  Once      04/24/19 1449    04/24/19 1315  sodium chloride 0.9 % infusion  Continuous      04/24/19 1219    04/24/19 1315  pantoprazole (PROTONIX) injection 40 mg  Every Early Morning      04/24/19 1221    04/24/19 1230  ondansetron (ZOFRAN) injection 4 mg  Every 6 Hours Scheduled      04/24/19 1220    04/24/19 1225  Urine Culture - Urine,  Once      04/24/19 1224    04/24/19 1156  Urine Drug Screen - Urine, Clean Catch  Once      04/24/19 1155    04/24/19 0915  famotidine (PEPCID) injection 20 mg  Every 12 Hours,   Status:  Discontinued      04/24/19 0826    04/24/19 0901  Fetal Monitoring  Once,   Status:  Canceled      04/24/19 0901    04/24/19 0901  Fetal Monitoring  Until Discontinued,   Status:  Canceled     Comments:  Continuous fetal monitoring while on magnesium until true source of pain is determined    04/24/19 0901    04/24/19 0831  Urinalysis With  Culture If Indicated - Urine, Clean Catch  Once,   Status:  Canceled      04/24/19 0831    04/24/19 0827  Hepatic Function Panel  Once,   Status:  Canceled      04/24/19 0826    04/24/19 0827  Comprehensive Metabolic Panel  Once      04/24/19 0826    04/24/19 0827  Bilirubin, Direct  PROCEDURE ONCE      04/24/19 0826    04/24/19 0826  Inpatient Gastroenterology Consult  Once     Specialty:  Gastroenterology  Provider:  Brunner, Mark I, MD    04/24/19 0826    04/24/19 0825  Place Sequential Compression Device  Once      04/24/19 0826    04/24/19 0825  Maintain Sequential Compression Device  Continuous      04/24/19 0826    04/24/19 0800  Fetal Monitoring  3 Times Daily,   Status:  Canceled     Comments:  NST    04/24/19 0654    04/24/19 0600  CBC (No Diff)  Morning Draw      04/23/19 2350    04/23/19 2130  lactated ringers infusion 1,000 mL  Once      04/23/19 2035 04/23/19 2115  betamethasone acetate-betamethasone sodium phosphate (CELESTONE SOLUSPAN) injection 12 mg  Every 24 Hours      04/23/19 2023 04/23/19 2059  Diet Regular  Diet Effective Now,   Status:  Canceled      04/23/19 2058 04/23/19 1809  Urinalysis, Microscopic Only - Urine, Clean Catch  Once      04/23/19 1808    04/23/19 1800  dextrose 5 % and sodium chloride 0.2 % infusion  Continuous,   Status:  Discontinued      04/23/19 1702    04/23/19 1745  magnesium sulfate bolus from bag 0.07 g/mL solution 4 g  Once      04/23/19 1652    04/23/19 1745  Magnesium Sulfate-Lact Ringers 40 GM/580ML  Continuous,   Status:  Discontinued      04/23/19 1652    04/23/19 1738  CT Abdomen Pelvis Without Contrast  1 Time Imaging      04/23/19 1737    04/23/19 1737  Urinalysis With Culture If Indicated - Urine, Clean Catch  Once      04/23/19 1737    04/23/19 1700  famotidine (PEPCID) injection 20 mg  2 Times Daily,   Status:  Discontinued      04/23/19 1555    04/23/19 1652  butorphanol (STADOL) injection 2 mg  Every 4 Hours PRN      04/23/19 1652    04/23/19  "1650  Strain All Urine  Once      19 1652    19 1650  CT Abdomen Pelvis Without Contrast  1 Time Imaging,   Status:  Canceled      19 1652    19 1555  NPO Diet  Diet Effective Now,   Status:  Canceled      19 1555    19 1530  dextrose 5 % and lactated Ringer's infusion  Continuous,   Status:  Discontinued      19 1431    19 1507  Amylase  STAT      19 1506    19 1507  Lipase  STAT      19 1506    19 1506  [facility]  1 Time Imaging      19 1505    19 1506  Comprehensive Metabolic Panel  STAT      19 1506    19 1449  POC Glucose Once  Once      19 1437    19 1430  promethazine (PHENERGAN) injection 12.5 mg  Every 6 Hours PRN      19 1431    19 1411  acetaminophen (TYLENOL) tablet 1,000 mg  Every 4 Hours PRN      19 1412    19 1305  Initiate Observation Status  Once      19 1304    19 1305  Fetal Nonstress Test  Once     Comments:  Patient presents with:  right sided pain: decreased fm \" i felt the baby move last night \" \" I am having pain in my right shoulder and back \"      19 1304    19 1305  CBC (No Diff)  STAT      19 1304    19 1305  Preeclampsia Panel  STAT      19 1304    --  SCANNED - LABS      19 0000          Operative/Procedure Notes (last 72 hours) (Notes from 2019 10:01 AM through 2019 10:01 AM)     No notes of this type exist for this encounter.           Physician Progress Notes (last 72 hours) (Notes from 2019 10:01 AM through 2019 10:01 AM)      Eli Rodriguez MD at 2019  8:38 AM           Sin Vogel Minor  : 2000  MRN: [id number]  CSN: [id number]    Antepartum Progress Note    Subjective   She just had her RUQ ultrasound this morning. She reports feeling better this morning. Pain is improved. No emesis. She is going to try and eat something " "this morning. Reports she got some good rest overnight. Reports FM. No contractions.     Objective     Min/max vitals past 24 hours:   Temp  Min: 98.1 °F (36.7 °C)  Max: 99.5 °F (37.5 °C)  BP  Min: 102/63  Max: 119/69  Pulse  Min: 111  Max: 125  Resp  Min: 16  Max: 18         General: well developed; well nourished  no acute distress   Heart: Not performed.   Lungs: breathing is unlabored   Abdomen: no umbilical or inguinal hernias are present  no hepato-splenomegaly  reproducible pain along lower ribs on right side and sternum   FHT's: reassuring and category 1   Cervix: was not checked.   Contractions: none              Assessment   1. IUP at 28w1d  2. RUQ pain - improved. Suspect viral gastritis versus component of musculoskeletal pain as well.   3. Tachycardia - EKG sinus tachycardia, improving   4. Fetal VSD - will need follow up with PDC in next 4 weeks.     Plan   1. Follow up RUQ ultrasound results  2. If able to tolerate food this morning will attempt to transition to all oral medication this afternoon.   3. Appreciate GI recommendations.     Eli Rodriguez MD  4/25/2019  8:38 AM             Electronically signed by Eli Rodriguez MD at 4/25/2019  8:41 AM     Brunner, Mark I, MD at 4/24/2019  6:10 PM        Patient was interviewed and examined.  She presented with acute right upper quadrant pain, that radiated to her back and right shoulder.  Pain has \"settled into my stomach\".  Pain is worse with deep inspiration.  Pain is reproducible on exam with palpation of the tight ribs and sternum. No further emesis today, and tolerating clears.    Suspect viral syndrome, especially given low-grade temperature today.  Will check right upper quadrant ultrasound tomorrow to assess for cholecystitis.  There is clearly a musculoskeletal component to her symptoms as pain is reproducible with palpation of the right lower ribs and sternum.  Small amount of hematemesis is likely related to Abbey-Sanchez tear.  Will " give twice daily PPI to aid in M-W healing and treat possible esophagitis.  Plan to defer EGD unless symptoms worsen or persist.    Electronically signed by Brunner, Mark I, MD at 2019  6:18 PM     Eli Rodriguez MD at 2019  2:46 PM        Appreciate GI recs. Discussed with patient's RN and no concern for contractions today. Will discontinue magnesium at this time as patient has received > 12 hours for neuroprotection. Can always restart if needed. Can do TID NSTs once off of magnesium.     Eli Rodriguez MD       Update at 1631  Patient reports compared to yesterday she does feel a little better. Pain is still present but closer now to epigastric region. Appreciate gi recs and reviewed plan of care with patient and mother. EKG ordered given tachycardia but it is improved. Will continue mIVFs.     Eli Rodriguez MD        Electronically signed by Eli Rodriguez MD at 2019  4:32 PM     Eli Rodriguez MD at 2019  8:17 AM           Sin Gaxiola  : 2000  MRN: [id number]  CSN: [id number]    Antepartum Progress Note    Subjective   Patient just had an episode of emesis with blood this morning. She is still reporting pain in the right upper quadrant area but has improved some she states compared to yesterday. Final CT report not back yet. Prelim per RN communication overnight with radiology staff is that the scan was overall normal. Reports FM. Afebrile overnight.     Objective     Min/max vitals past 24 hours:   Temp  Min: 98.2 °F (36.8 °C)  Max: 98.7 °F (37.1 °C)  BP  Min: 104/55  Max: 131/56  Pulse  Min: 96  Max: 138  Resp  Min: 15  Max: 20         General: well developed; well nourished  no acute distress   Appears tired and in slight pain   Heart: Tachycardic, regular rhythm    Lungs: breathing is unlabored  clear to auscultation bilaterally   Abdomen: no umbilical or inguinal hernias are present  no hepato-splenomegaly  mild TTP in right upper  quadrant    FHT's: reactive and reassuring for GA   Cervix: was not checked.   Contractions: none              Assessment   5. IUP at 28w0d  6. Right upper quadrant pain - CT scan prelim report overall normal and MFM OB u/s yesterday without any occult concern for placental abruption. Differential to include nephrolithiasis, peptic ulcer   7. Leukocytosis - afebrile, could be due to recent betamethasone administration   8. Tachycardia - no respiratory or cardiac complaints, afebrile  9. Fetal VSD - will need follow up with PDC in next 4 week     Plan   4. Continue magnesium sulfate through steroid window  5. Complete  steroid course this evening for fetal lung maturity  6. Urine culture and repeat CMP, hepatic function panel.   7. Consult GI given hematemesis this morning  8. Continue IV pepcid BID  9. Continue to strain urine     Eli Rodriguez MD  2019  8:17 AM             Electronically signed by Eli Rodriguez MD at 2019  8:31 AM     Eli Rodriguez MD at 2019  6:23 PM        Talked briefly with patient and her family prior to moving to CT. Agree with plan and appreciate Dr. Belcher and Dr. Lyn's care.     Eli Rodriguez MD      Electronically signed by Eli Rodriguez MD at 2019  6:24 PM     Richar Belcher DO at 2019  4:54 PM        Laborist      Patient's pain has   Progressed with multiple episodes of nausea and vomiting.    PDC ultrasound   revealed a single IUP, fetus active, no evidence of placental abruption,   Normal amniotic fluid, and a small VSD- noted see report.    PLAN  1.  Magnesium sulfate for neuro protection and uterine tocolysis, strain urine, CT of abdomen and pelvis to evaluate for ureterolithiasis and appendicitis.  Will hold off on steroids for fetal lung maturity enhancement until after the CT scan.  Discussed with patient the risk of a CT-slight increased risk of childhood leukemia above the normal population.  All questions  answered patient agreed with plan.  Discussed plan with Dr. Rodriguez    Electronically signed by Richar Belcher DO at 2019  5:40 PM          Consult Notes (last 72 hours) (Notes from 2019 10:01 AM through 2019 10:01 AM)      Allison Esteban APRN at 2019 10:27 AM      Consult Orders    1. Inpatient Gastroenterology Consult [[id number]] ordered by Eli Rodriguez MD at 19 0826           Attestation signed by Brunner, Mark I, MD at 2019  6:09 PM    I have reviewed the documentation above and agree. Patient seen and examined. See progress note.                    Gastroenterology [place]      Name:BARRY BASSETT : 2000 MRN# [id number]  Date of Service: 19   Admitted 2019 12:35 PM  LOS: 0 days Room N332/1  Reason for Consultation: abd pain and hematemesis  CC: abd pain and n/v   Assessment/Plan                                             ASSESSMENT & PLANS   19 y.o. female who is admitted on 2019 for Pregnancy [Z34.90].    Acute, RUQ abd pain r/t viral process vs biliary cause vs muscular skeletal  Intractable  N/V w/ 1 episode of hematemesis, which is possibly from Abbey-Sanchez tear.  Acute onset appears to be viral related  Leukocytosis.  WBC 13K yesterday 18.6 today.  Low grade fever 99.5  Dehydration (BUN 3 K 3.4 and Na 133)  · GB US in AM to r/o biliary cause, although LFT normal and pain not postprandial  · Schedule Zofran IV q 6 hrs instead of PRN.  OK to continue PRN Phenergan if ok w/ OB-GYN  · DC Pepcid.  Start Protonix 40 mg IV  · UDS to r/o cannabis hyperemesis  · Repeat H/H and BMP this PM  · IVF and electrolytes replacements per primary team  · Conservative mgt d/t pregnancy       Subjective                                                     SUBJECTIVE   HPI:Ms. Baryr Bassett is a 19 y.o.pregnant female (28 wks) admitted for RUQ started 2 days ago.  Pain is constant. Worse w/ touching or taking deep breath.   Associated n/v.  Multiple episodes yesterday w/ clear/white emesis yesterday.  One episode of hematemesis around 8A this morning. Pt still has it in the emesis basin.  I moderate amount of w/ white/clear emesis w/ some blood steaks.  Denies recurrence of hematemesis since.  Still nausea controlled some w/ PRN Phenergan.    Denies NSAIDS or ETOH even before pregnancy.  Admits of smoking THC since age 12 up until pregnancy. UDS + THC in Dec 2018    Work up:  CT showed normal liver and GB.  No biliary dilation. There is prominence of the right renal collecting system and right ureter likely hydronephrosis of pregnancy, but no evidence for obstructing renal stone. No acute appendicitis or inflammatory findings of the bowel.      LFT normal. WBC elevated. Low grade fever 99.5    ROS: Complete 14-system ROS performed & documented w/ pertinent findings included in HPI.  All other sys are negative.  Subjective   PAST MED HX: Pt has a past medical history of Seasonal allergies.  PAST SURG HX: Pt has a past surgical history that includes Antonito tooth extraction (2011) and Foreign Body Removal (2013).  FAM HX: family history includes Breast cancer in her maternal aunt; Cancer in her mother; Colon cancer in her maternal uncle; Crohn's disease in her maternal aunt; Fibroids in her mother; Obesity in her mother; Ovarian cancer in her maternal grandmother.  SOC HX: Pt reports that she quit smoking about 5 months ago. Her smoking use included cigarettes. She has never used smokeless tobacco. She reports that she does not drink alcohol or use drugs.  Objective                                                           OBJECTIVE   Allergy: Pt has No Known Allergies.  Scheduled Meds  betamethasone acetate-betamethasone sodium phosphate 12 mg Intramuscular Q24H   famotidine 20 mg Intravenous BID   lactated ringers 1,000 mL Intravenous Once     Infusions  dextrose 5 % and sodium chloride 0.2 % 96 mL/hr Last Rate: 96 mL/hr (04/24/19 0610)    Magnesium Sulfate-Lact Ringers 2 g/hr Last Rate: 2 g/hr (04/24/19 0610)     PRN Meds•  acetaminophen  •  butorphanol  •  promethazine  Home Meds  Medications Prior to Admission   Medication Sig Dispense Refill Last Dose   • ondansetron (ZOFRAN) 4 MG tablet Take 1 tablet by mouth Every 8 (Eight) Hours As Needed for Nausea or Vomiting. 30 tablet 1 Past Month at Unknown time   • Prenatal Vit-Fe Fumarate-FA (PRENATAL VITAMIN PO) Take 2 tablets by mouth Daily.   4/23/2019 at Unknown time   • doxylamine (UNISOM) 25 MG tablet Take 1 tablet by mouth At Night As Needed for Nausea. 30 tablet 1 Not Taking   • vitamin B-6 (PYRIDOXINE) 25 MG tablet Take 1 tablet by mouth Every 8 (Eight) Hours As Needed (nausea). 30 tablet 1 Not Taking     Results from last 7 days   Lab Units 04/24/19  0613 04/23/19  1320 04/17/19  1634   HEMOGLOBIN g/dL 11.0* 12.1 11.6*   HEMATOCRIT % 33.5* 36.1 36.9     Results from last 7 days   Lab Units 04/24/19  0912 04/24/19  0613 04/23/19  1320 04/17/19  1634   WBC 10*3/mm3  --  18.55* 12.96* 10.73   MCV fL  --  84.4 83.8 87.0   PLATELETS 10*3/mm3  --  324 288 311   BUN / CREAT RATIO  7.7  --  14.3  --    ANION GAP mmol/L 13.0  --  15.0  --      Results from last 7 days   Lab Units 04/24/19  0912 04/23/19  1320 04/17/19  1634   AST (SGOT) U/L 15 26  16 20   ALT (SGPT) U/L 17 16  19 27   ALK PHOS U/L 113 116  114 109   BILIRUBIN mg/dL 0.3 0.3  0.3 0.2   BILIRUBIN DIRECT mg/dL <0.2*  --   --    ALBUMIN g/dL 3.60 3.50  --    LIPASE U/L  --  17  --    AMYLASE U/L  --  70  --    BUN mg/dL 3* 6  --    CREATININE mg/dL 0.39* 0.42*  0.42* 0.59   EGFR IF NONAFRICN AM mL/min/1.73 >150 >150  --    SODIUM mmol/L 133* 141  --    POTASSIUM mmol/L 3.4* 4.1  --    CO2 mmol/L 18.0* 19.0*  --    GLUCOSE mg/dL 160* 108*  --      Temp  Min: 98.2 °F (36.8 °C)  Max: 99.5 °F (37.5 °C)   BP  Min: 104/55  Max: 131/56   Pulse  Min: 96  Max: 138   Resp  Min: 15  Max: 20   SpO2  Min: 99 %  Max: 99 %   There is no height or  weight on file to calculate BMI.  Wt Readings from Last 5 Encounters:   04/17/19 83.6 kg (184 lb 3.2 oz)   03/20/19 80.9 kg (178 lb 6.4 oz)   02/20/19 84.8 kg (187 lb)   01/16/19 79.8 kg (176 lb)   12/12/18 80.3 kg (177 lb)     Physical Exam  General Fatigue appearing; no acute distress.   ENT Oral mucosa pink and dry without thrush or lesions.    Neck Neck supple; trachea midline. No thyromegaly   Resp CTA; no rhonchi, rales, or wheezes.  Respiration effort normal  CV RRR; normal S1, S2; no M/R/G. No lower extremity edema  GI Abd soft, mild/mod tenderness to light palpation RUQ , pregnant abd, normal active bowel sounds.  No abd hernia  Skin No rash; no lesions; no bruises.  Skin turgor normal  Musc No clubbing; no cyanosis.  Moving all extremities  Psych Oriented to time, place, and person.  Appropriate affect    Allison Esteban APRN  [phone]      Electronically signed by Brunner, Mark I, MD at 4/24/2019  6:09 PM         FHR (last 3 days)     Date/Time Fetal HR Assessment Method Fetal HR (beats/min) Fetal Heart Baseline Rate Fetal HR Variability Fetal HR Accelerations Fetal HR Decelerations Fetal HR Tracing Category    04/24/19 2006  external  140  normal range  moderate (amplitude range 6 to 25 bpm)  greater than/equal to 10 bpm (32 wks gest or less);lasts at least 10 seconds (32 wks gest or less)  absent  --    04/24/19 2000  external  140  normal range  moderate (amplitude range 6 to 25 bpm)  greater than/equal to 10 bpm (32 wks gest or less);lasts at least 10 seconds (32 wks gest or less)  absent  --    04/24/19 1945  external  135  normal range  moderate (amplitude range 6 to 25 bpm)  greater than/equal to 10 bpm (32 wks gest or less);lasts at least 10 seconds (32 wks gest or less)  absent  --    04/24/19 1135  --  --  --  --  --  --  --    04/24/19 1100  external  130  normal range  moderate (amplitude range 6 to 25 bpm)  greater than/equal to 15 bpm;lasting at least 15 seconds  absent  --     04/24/19 1000  external  130  normal range  moderate (amplitude range 6 to 25 bpm)  greater than/equal to 15 bpm;lasting at least 15 seconds  absent  --    04/24/19 0900  external  130  normal range  moderate (amplitude range 6 to 25 bpm)  greater than/equal to 15 bpm;lasting at least 15 seconds  absent  --    04/24/19 0600  external  125  normal range  moderate (amplitude range 6 to 25 bpm)  greater than/equal to 15 bpm;lasting at least 15 seconds;prolonged  absent  --    04/24/19 0500  external  135  normal range  moderate (amplitude range 6 to 25 bpm)  greater than/equal to 15 bpm;lasting at least 15 seconds;prolonged  absent  --    04/24/19 0400  external  135  normal range  moderate (amplitude range 6 to 25 bpm)  greater than/equal to 15 bpm;lasting at least 15 seconds;prolonged  absent  --    04/24/19 0300  external  125  normal range  moderate (amplitude range 6 to 25 bpm)  greater than/equal to 15 bpm;lasting at least 15 seconds;prolonged  absent  --    04/24/19 0200  external  130  normal range  moderate (amplitude range 6 to 25 bpm)  greater than/equal to 15 bpm;lasting at least 15 seconds;prolonged  absent  --    04/24/19 0100  external  135  normal range  moderate (amplitude range 6 to 25 bpm)  greater than/equal to 15 bpm;lasting at least 15 seconds;prolonged  absent  --    04/24/19 0000  external  135  normal range  moderate (amplitude range 6 to 25 bpm)  greater than/equal to 15 bpm;lasting at least 15 seconds;prolonged  absent  --    04/23/19 2300  external  135  normal range  moderate (amplitude range 6 to 25 bpm)  greater than/equal to 15 bpm;lasting at least 15 seconds;prolonged  absent  --    04/23/19 2200  external  140  normal range  moderate (amplitude range 6 to 25 bpm)  greater than/equal to 15 bpm;lasting at least 15 seconds  absent  --    04/23/19 2100  external  130  normal range  moderate (amplitude range 6 to 25 bpm)  greater than/equal to 15 bpm;lasting at least 15 seconds;prolonged   absent  --    04/23/19 2000  external  125  normal range  moderate (amplitude range 6 to 25 bpm)  greater than/equal to 15 bpm;lasting at least 15 seconds  absent  --    04/23/19 1900  --  140  normal range  moderate (amplitude range 6 to 25 bpm)  greater than/equal to 15 bpm;lasting at least 15 seconds  absent  --    04/23/19 1808  --  -- Tracing maternal HR  --  --  --  --  --    04/23/19 1800  external  -- pt sitting up; tracing maternal  --  --  --  --  --    04/23/19 1745  external  140 tracing maternal when sitting up  normal range  moderate (amplitude range 6 to 25 bpm)  greater than/equal to 15 bpm;lasting at least 15 seconds  absent  --    04/23/19 1730  external  140  normal range  moderate (amplitude range 6 to 25 bpm)  greater than/equal to 15 bpm;lasting at least 15 seconds  absent  --    04/23/19 1715  external  145 mom sitting up frequently; tracing maternal when sitting up  normal range  --  --  --  --    04/23/19 1700  external  -- tracing maternal when sitting up  --  --  --  --  --    04/23/19 1645  external pt off the monitor  --  --  --  --  --  --    04/23/19 1630  external  150 tracing maternal when sitting up  normal range  moderate (amplitude range 6 to 25 bpm)  greater than/equal to 15 bpm;lasting at least 15 seconds  absent  -- tracing maternal heartrate while sitting up    04/23/19 1615  external  -- tracing maternal  --  --  --  --  -- tracing maternal heartrate while sitting up    04/23/19 1600  external  140  normal range  moderate (amplitude range 6 to 25 bpm)  greater than/equal to 15 bpm;lasting at least 15 seconds  absent  -- tracing maternal heartrate while sitting up    04/23/19 1545  external  --  --  --  --  --  -- off for scan    04/23/19 1530  external  140  --  --  --  --  -- poor tracing; off for scan    04/23/19 1515  external  140  normal range  moderate (amplitude range 6 to 25 bpm)  greater than/equal to 15 bpm;lasting at least 15 seconds  absent  -- tracing maternal  heartrate while sitting up    04/23/19 1500  external  140  normal range  moderate (amplitude range 6 to 25 bpm)  greater than/equal to 15 bpm;lasting at least 15 seconds  absent  -- tracing maternal heartrate while sitting up    04/23/19 1445  external  140  normal range  moderate (amplitude range 6 to 25 bpm)  greater than/equal to 15 bpm;lasting at least 15 seconds  absent  --    04/23/19 1430  external  140  normal range  moderate (amplitude range 6 to 25 bpm)  greater than/equal to 15 bpm;lasting at least 15 seconds  absent diff tracing  --    04/23/19 1415  external  130  normal range  moderate (amplitude range 6 to 25 bpm)  greater than/equal to 15 bpm;lasting at least 15 seconds  absent diff tracing  --    04/23/19 1400  external  130  normal range  moderate (amplitude range 6 to 25 bpm)  greater than/equal to 15 bpm;lasting at least 15 seconds  absent diff tracing  --    04/23/19 1345  external  130  normal range  moderate (amplitude range 6 to 25 bpm)  greater than/equal to 15 bpm;lasting at least 15 seconds  absent diff tracing  --    04/23/19 1330  external  135  normal range  moderate (amplitude range 6 to 25 bpm)  greater than/equal to 15 bpm;lasting at least 15 seconds  absent  --    04/23/19 1315  external  135  normal range  moderate (amplitude range 6 to 25 bpm)  greater than/equal to 15 bpm;lasting at least 15 seconds  absent  --    04/23/19 1258  external  156  normal range  moderate (amplitude range 6 to 25 bpm)  --  --  --        FHR B (last 3 days)     None        Uterine Activity (last 3 days)     Date/Time Method Contraction Frequency (Minutes) Contraction Duration (sec) Contraction Intensity Uterine Resting Tone Contraction Pattern    04/24/19 2015  --  --  --  --  soft by palpation  --    04/24/19 2006  external tocotransducer  --  --  no contractions  --  --    04/24/19 2000  external tocotransducer  --  --  no contractions  --  --    04/24/19 1945  external tocotransducer  --  --  no  contractions  --  --    04/24/19 1748  palpation  --  --  --  soft by palpation  --    04/24/19 1314  palpation  --  --  --  soft by palpation  --    04/24/19 1135  --  --  --  --  --  --    04/24/19 1100  external tocotransducer  --  --  no contractions  --  --    04/24/19 1000  external tocotransducer  --  --  no contractions  --  --    04/24/19 0900  external tocotransducer  --  --  no contractions  --  --    04/24/19 0801  palpation  --  --  --  soft by palpation  --    04/24/19 0600  external tocotransducer  --  --  no contractions  soft by palpation  --    04/24/19 0500  external tocotransducer  --  --  no contractions  soft by palpation  --    04/24/19 0400  external tocotransducer  --  --  no contractions  --  --    04/24/19 0300  external tocotransducer  --  --  no contractions  --  --    04/24/19 0200  external tocotransducer  --  --  no contractions  --  --    04/24/19 0100  external tocotransducer  --  --  no contractions  soft by palpation  --    04/24/19 0000  external tocotransducer  --  --  no contractions  soft by palpation  --    04/23/19 2300  external tocotransducer  --  --  no contractions  soft by palpation  --    04/23/19 2200  external tocotransducer  --  --  no contractions  soft by palpation  --    04/23/19 2100  external tocotransducer  --  --  no contractions  soft by palpation  --    04/23/19 2000  external tocotransducer  --  --  no contractions  soft by palpation  --    04/23/19 1900  --  --  --  no contractions  soft by palpation  --    04/23/19 1808  --  --  --  no contractions  --  --    04/23/19 1800  external tocotransducer  --  --  no contractions  --  --    04/23/19 1745  external tocotransducer  --  --  no contractions  --  --    04/23/19 1730  external tocotransducer  --  --  no contractions  --  --    04/23/19 1715  external tocotransducer  -- difficult to determine  --  --  --  --    04/23/19 1700  external tocotransducer  --  --  no contractions  --  Irritability     04/23/19 1645  external tocotransducer pt off the monitor  --  --  --  --  --    04/23/19 1630  external tocotransducer  --  --  no contractions  --  --    04/23/19 1615  external tocotransducer  --  --  no contractions  --  --    04/23/19 1600  external tocotransducer  --  --  no contractions  --  --    04/23/19 1545  external tocotransducer  -- off for scan  --  --  --  --    04/23/19 1530  external tocotransducer  -- off for scan  --  no contractions  --  --    04/23/19 1515  external tocotransducer  --  --  no contractions  --  --    04/23/19 1500  external tocotransducer  --  --  --  --  -- difficult to determine    04/23/19 1445  external tocotransducer  --  --  no contractions  --  --    04/23/19 1430  external tocotransducer  --  --  no contractions  --  --    04/23/19 1415  external tocotransducer  --  --  no contractions  --  --    04/23/19 1400  external tocotransducer  --  --  no contractions  --  --    04/23/19 1345  external tocotransducer  --  --  no contractions  --  --    04/23/19 1330  external tocotransducer  --  --  --  --  Irregular    04/23/19 1315  external tocotransducer  --  --  --  --  Irregular    04/23/19 1258  external tocotransducer  --  --  --  soft by palpation  Irregular

## 2019-04-26 NOTE — DISCHARGE SUMMARY
Sin Gaxiola  : 2000  MRN: [id number]  CSN: [id number]    Discharge Summary      Date of Admission: 2019   Date of Discharge:    Discharge Diagnoses: 1. IUP at 28 weeks 1 day  2. Improving right upper quadrant pain  3. Gallbladder sludge   4. Fetal VSD   Procedures Performed: Right upper quadrant u/s  CT abdomen/pelvis      Consults: Gastroenterology    Brief History: Patient is a 19 y.o.  who presented to the hospital on 19 with complaints of RUQ and nausea and vomiting.    Hospital Course: Given the significance of her pain and symptoms an OB ultrasound was performed which showed no occult signs of a placental abruption. Fetus was overall appropriate size but a possible VSD was seen. In addition a CT scan was performed but there was no evidence of appendicitis or bowel obstructions. Ultimately a GI consult was placed and patient had a RUQ u/s that demonstrated some gallbladder sludge but was otherwise normal. She improved with scheduled IV zofran and daily IV protonix. On HD#3 she was symptomatically feeling better and tolerating a regular diet. She was discharged to home with prn PO zofran and phenergan and daily PO protonix    Pending Studies: Pending tests: none   Condition at discharge: gradually improving   Discharge Medications:    Your medication list      START taking these medications      Instructions Last Dose Given Next Dose Due   pantoprazole 40 MG EC tablet  Commonly known as:  PROTONIX      Take 1 tablet by mouth Daily.       promethazine 12.5 MG tablet  Commonly known as:  PHENERGAN      Take 1 tablet by mouth Every 6 (Six) Hours As Needed for Nausea or Vomiting.          CHANGE how you take these medications      Instructions Last Dose Given Next Dose Due   ondansetron 4 MG tablet  Commonly known as:  ZOFRAN  What changed:  when to take this      Take 1 tablet by mouth Every 6 (Six) Hours As Needed for Nausea or Vomiting.       Prenatal Vitamin  27-0.8 MG tablet  What changed:    · medication strength  · how much to take      Take 1 tablet by mouth Daily.          CONTINUE taking these medications      Instructions Last Dose Given Next Dose Due   doxylamine 25 MG tablet  Commonly known as:  UNISOM      Take 1 tablet by mouth At Night As Needed for Nausea.       vitamin B-6 25 MG tablet  Commonly known as:  PYRIDOXINE      Take 1 tablet by mouth Every 8 (Eight) Hours As Needed (nausea).             Where to Get Your Medications      These medications were sent to [facility] - [address] [facility] [address] [facility] - [phone]  - [phone] FX  [address] [facility] [address], [facility] [address]    Phone:  [phone] ·   ondansetron 4 MG tablet  · pantoprazole 40 MG EC tablet  · Prenatal Vitamin 27-0.8 MG tablet  · promethazine 12.5 MG tablet        Discharge Disposition: home   Follow-up: Future Appointments   Date Time Provider Department Center   5/22/2019  9:30 AM  [facility] DEPT SCHEDULE [facility] None   5/22/2019  9:45 AM [facility] US 1  [facility]            This note has been electronically signed.    Eli Rodriguez MD  April 26, 2019

## 2019-04-29 ENCOUNTER — TELEPHONE (OUTPATIENT)
Dept: OBSTETRICS AND GYNECOLOGY | Facility: CLINIC | Age: 19
End: 2019-04-29

## 2019-04-29 PROBLEM — O23.43 URINARY TRACT INFECTION IN MOTHER DURING THIRD TRIMESTER OF PREGNANCY: Status: ACTIVE | Noted: 2019-04-29

## 2019-04-29 PROBLEM — K83.8 BILIARY SLUDGE: Status: ACTIVE | Noted: 2019-04-29

## 2019-04-29 LAB — BACTERIA SPEC AEROBE CULT: ABNORMAL

## 2019-04-29 NOTE — TELEPHONE ENCOUNTER
Called patient to review +urine culture from recent admission. Will administer 1g of IM rocephin when she comes to the office on Wednesday.     .Eli Rodriguez MD

## 2019-05-01 ENCOUNTER — ROUTINE PRENATAL (OUTPATIENT)
Dept: OBSTETRICS AND GYNECOLOGY | Facility: CLINIC | Age: 19
End: 2019-05-01

## 2019-05-01 VITALS — DIASTOLIC BLOOD PRESSURE: 84 MMHG | BODY MASS INDEX: 31.86 KG/M2 | WEIGHT: 174.2 LBS | SYSTOLIC BLOOD PRESSURE: 122 MMHG

## 2019-05-01 DIAGNOSIS — Z34.03 ENCOUNTER FOR SUPERVISION OF NORMAL FIRST PREGNANCY IN THIRD TRIMESTER: Primary | ICD-10-CM

## 2019-05-01 PROCEDURE — 99213 OFFICE O/P EST LOW 20 MIN: CPT | Performed by: OBSTETRICS & GYNECOLOGY

## 2019-05-15 ENCOUNTER — LAB (OUTPATIENT)
Dept: LAB | Facility: HOSPITAL | Age: 19
End: 2019-05-15

## 2019-05-15 ENCOUNTER — ROUTINE PRENATAL (OUTPATIENT)
Dept: OBSTETRICS AND GYNECOLOGY | Facility: CLINIC | Age: 19
End: 2019-05-15

## 2019-05-15 VITALS — SYSTOLIC BLOOD PRESSURE: 122 MMHG | DIASTOLIC BLOOD PRESSURE: 82 MMHG | BODY MASS INDEX: 32.63 KG/M2 | WEIGHT: 178.4 LBS

## 2019-05-15 DIAGNOSIS — O99.810 ABNORMAL O'SULLIVAN GLUCOSE CHALLENGE TEST, ANTEPARTUM: Primary | ICD-10-CM

## 2019-05-15 DIAGNOSIS — Z34.02 ENCOUNTER FOR SUPERVISION OF NORMAL FIRST PREGNANCY IN SECOND TRIMESTER: ICD-10-CM

## 2019-05-15 DIAGNOSIS — Z34.03 ENCOUNTER FOR SUPERVISION OF NORMAL FIRST PREGNANCY IN THIRD TRIMESTER: Primary | ICD-10-CM

## 2019-05-15 DIAGNOSIS — O23.40 URINARY TRACT INFECTION IN MOTHER DURING PREGNANCY, ANTEPARTUM: ICD-10-CM

## 2019-05-15 LAB
GLUCOSE 1H P 100 G GLC PO SERPL-MCNC: 148 MG/DL
HCT VFR BLD AUTO: 37.4 % (ref 34–46.6)
HGB BLD-MCNC: 11.8 G/DL (ref 12–15.9)

## 2019-05-15 PROCEDURE — 96372 THER/PROPH/DIAG INJ SC/IM: CPT | Performed by: OBSTETRICS & GYNECOLOGY

## 2019-05-15 PROCEDURE — 85014 HEMATOCRIT: CPT

## 2019-05-15 PROCEDURE — 99213 OFFICE O/P EST LOW 20 MIN: CPT | Performed by: OBSTETRICS & GYNECOLOGY

## 2019-05-15 PROCEDURE — 36415 COLL VENOUS BLD VENIPUNCTURE: CPT

## 2019-05-15 PROCEDURE — 85018 HEMOGLOBIN: CPT

## 2019-05-15 PROCEDURE — 82950 GLUCOSE TEST: CPT

## 2019-05-15 RX ORDER — CEFTRIAXONE 500 MG/1
500 INJECTION, POWDER, FOR SOLUTION INTRAMUSCULAR; INTRAVENOUS ONCE
Status: COMPLETED | OUTPATIENT
Start: 2019-05-15 | End: 2019-05-15

## 2019-05-15 RX ADMIN — CEFTRIAXONE 500 MG: 500 INJECTION, POWDER, FOR SOLUTION INTRAMUSCULAR; INTRAVENOUS at 15:50

## 2019-05-15 RX ADMIN — CEFTRIAXONE 500 MG: 500 INJECTION, POWDER, FOR SOLUTION INTRAMUSCULAR; INTRAVENOUS at 15:52

## 2019-05-15 NOTE — PROGRESS NOTES
Chief Complaint   Patient presents with   • Routine Prenatal Visit     pt states no c/o. pt received 1 gram of Rocephin for UTI.       HPI: More is a  currently at 31w0d who today reports the following:  Contractions - No; Leaking - No; Vaginal bleeding -  No; Heartburn - No.    ROS:  GI: Nausea - No ; Constipation - No; Diarrhea - No    Neuro: Headache - No ; Visual change - No      EXAM:  Vitals: See prenatal flowsheet   Abdomen: See prenatal flowsheet   Urine glucose/protein: See prenatal flowsheet   Pelvic: See prenatal flowsheet     Lab Results   Component Value Date    HGB 10.9 (L) 2019    HCT 32.5 (L) 2019    ABO O 2018    RH Positive 2018    ABSCRN Negative 2018       MDM:  Impression: 1. Supervision of low risk pregnancy  2. History of biliary colic/GB sludge- symptomaticaly improved after inpatient stay   3. History of UTI during recent admission only sensitive to rocephin - received today   4. Fetal VSD - follow up PDC scan next week    Tests done today: 1. GCT  2. HgB   Topics discussed: 1. Continue with PNV's  2. Prenatal labs reviewed  3. none - she had no major complaints,questions or concerns   Tests scheduled today for her next visit:   none

## 2019-05-22 ENCOUNTER — OFFICE VISIT (OUTPATIENT)
Dept: OBSTETRICS AND GYNECOLOGY | Facility: HOSPITAL | Age: 19
End: 2019-05-22

## 2019-05-22 ENCOUNTER — HOSPITAL ENCOUNTER (OUTPATIENT)
Dept: WOMENS IMAGING | Facility: HOSPITAL | Age: 19
Discharge: HOME | End: 2019-05-22
Admitting: OBSTETRICS & GYNECOLOGY

## 2019-05-22 VITALS
HEIGHT: 62 IN | WEIGHT: 181 LBS | BODY MASS INDEX: 33.31 KG/M2 | DIASTOLIC BLOOD PRESSURE: 81 MMHG | SYSTOLIC BLOOD PRESSURE: 122 MMHG

## 2019-05-22 DIAGNOSIS — Z34.90 PREGNANCY, UNSPECIFIED GESTATIONAL AGE: ICD-10-CM

## 2019-05-22 DIAGNOSIS — O35.BXX0 ANOMALY OF HEART OF FETUS AFFECTING PREGNANCY, ANTEPARTUM, SINGLE OR UNSPECIFIED FETUS: Primary | ICD-10-CM

## 2019-05-22 DIAGNOSIS — O35.BXX0 ANOMALY OF FETAL HEART AFFECTING SINGLETON PREGNANCY, ANTEPARTUM: ICD-10-CM

## 2019-05-22 PROCEDURE — 76825 ECHO EXAM OF FETAL HEART: CPT

## 2019-05-22 PROCEDURE — 93325 DOPPLER ECHO COLOR FLOW MAPG: CPT

## 2019-05-22 PROCEDURE — 76825 ECHO EXAM OF FETAL HEART: CPT | Performed by: OBSTETRICS & GYNECOLOGY

## 2019-05-22 PROCEDURE — 93325 DOPPLER ECHO COLOR FLOW MAPG: CPT | Performed by: OBSTETRICS & GYNECOLOGY

## 2019-05-22 PROCEDURE — 76816 OB US FOLLOW-UP PER FETUS: CPT

## 2019-05-22 PROCEDURE — 76816 OB US FOLLOW-UP PER FETUS: CPT | Performed by: OBSTETRICS & GYNECOLOGY

## 2019-05-22 NOTE — PROGRESS NOTES
Denies problems. Glucose screen done last week was abnormal; 3 hr OGTT has been ordered and patient plans to do it sometime this week. Next OB visit is 5/29/2019.

## 2019-05-26 ENCOUNTER — LAB (OUTPATIENT)
Dept: LAB | Facility: HOSPITAL | Age: 19
End: 2019-05-26

## 2019-05-26 DIAGNOSIS — Z34.82 PRENATAL CARE, SUBSEQUENT PREGNANCY, SECOND TRIMESTER: Primary | ICD-10-CM

## 2019-05-26 DIAGNOSIS — O99.810 ABNORMAL O'SULLIVAN GLUCOSE CHALLENGE TEST, ANTEPARTUM: ICD-10-CM

## 2019-05-26 LAB
GLUCOSE P FAST SERPL-MCNC: 84 MG/DL (ref 65–94)
GTT GEST 2H PNL UR+SERPL: 167 MG/DL (ref 65–179)
GTT GEST 3H PNL SERPL: 103 MG/DL (ref 65–139)
GTT GEST 3H PNL SERPL: 157 MG/DL (ref 65–154)

## 2019-05-26 PROCEDURE — 82951 GLUCOSE TOLERANCE TEST (GTT): CPT

## 2019-05-26 PROCEDURE — 36415 COLL VENOUS BLD VENIPUNCTURE: CPT

## 2019-05-26 PROCEDURE — 82952 GTT-ADDED SAMPLES: CPT

## 2019-05-29 ENCOUNTER — ROUTINE PRENATAL (OUTPATIENT)
Dept: OBSTETRICS AND GYNECOLOGY | Facility: CLINIC | Age: 19
End: 2019-05-29

## 2019-05-29 VITALS — DIASTOLIC BLOOD PRESSURE: 78 MMHG | SYSTOLIC BLOOD PRESSURE: 104 MMHG | BODY MASS INDEX: 34.02 KG/M2 | WEIGHT: 183 LBS

## 2019-05-29 DIAGNOSIS — O09.93 HIGH-RISK PREGNANCY IN THIRD TRIMESTER: Primary | ICD-10-CM

## 2019-05-29 DIAGNOSIS — O23.43 URINARY TRACT INFECTION IN MOTHER DURING THIRD TRIMESTER OF PREGNANCY: ICD-10-CM

## 2019-05-29 PROCEDURE — 99213 OFFICE O/P EST LOW 20 MIN: CPT | Performed by: OBSTETRICS & GYNECOLOGY

## 2019-05-29 NOTE — PROGRESS NOTES
Chief Complaint   Patient presents with   • Routine Prenatal Visit     pt states no c/o       HPI: More is a  currently at 33w0d who today reports the following:  Contractions - No; Leaking - No; Vaginal bleeding -  No; Swelling of extremities - No.    ROS:  GI: Nausea - No; Constipation - No; Diarrhea - No    Neuro: Headache - No; Visual change - No      EXAM:  Vitals: See prenatal flowsheet   Abdomen: See prenatal flowsheet   Urine glucose/protein: See prenatal flowsheet   Pelvic: See prenatal flowsheet   MDM:   Impression: 1. Supervision of low risk pregnancy  2. History of biliary colic/GB sludge- symptomaticaly improved after inpatient stay   3. History of UTI during recent admission only sensitive to rocephin - s/p last visit   4. Fetal VSD - small muscular seen last week. Will need  fetal echo    Tests done today: 1. Urine culture JAZMINE   Topics discussed: 1. Continue with PNV's  2. Prenatal labs reviewed  3.  labor signs and symptoms   Tests scheduled today for her next visit:   none

## 2019-06-07 ENCOUNTER — HOSPITAL ENCOUNTER (OUTPATIENT)
Facility: HOSPITAL | Age: 19
Discharge: HOME | End: 2019-06-07
Attending: OBSTETRICS & GYNECOLOGY | Admitting: OBSTETRICS & GYNECOLOGY

## 2019-06-07 VITALS — SYSTOLIC BLOOD PRESSURE: 107 MMHG | DIASTOLIC BLOOD PRESSURE: 68 MMHG | RESPIRATION RATE: 16 BRPM | TEMPERATURE: 98.4 F

## 2019-06-07 LAB
ALBUMIN SERPL-MCNC: 3.5 G/DL (ref 3.5–5.2)
ALBUMIN/GLOB SERPL: 1.1 G/DL
ALP SERPL-CCNC: 188 U/L (ref 39–117)
ALT SERPL W P-5'-P-CCNC: 14 U/L (ref 1–33)
AMYLASE SERPL-CCNC: 59 U/L (ref 28–100)
ANION GAP SERPL CALCULATED.3IONS-SCNC: 16 MMOL/L
AST SERPL-CCNC: 19 U/L (ref 1–32)
BACTERIA UR QL AUTO: ABNORMAL /HPF
BILIRUB SERPL-MCNC: 0.7 MG/DL (ref 0.2–1.2)
BILIRUB UR QL STRIP: ABNORMAL
BUN BLD-MCNC: 6 MG/DL (ref 6–20)
BUN/CREAT SERPL: 10.5 (ref 7–25)
CALCIUM SPEC-SCNC: 8.9 MG/DL (ref 8.6–10.5)
CHLORIDE SERPL-SCNC: 97 MMOL/L (ref 98–107)
CLARITY UR: ABNORMAL
CO2 SERPL-SCNC: 22 MMOL/L (ref 22–29)
COLOR UR: ABNORMAL
CREAT BLD-MCNC: 0.57 MG/DL (ref 0.57–1)
DEPRECATED RDW RBC AUTO: 38.1 FL (ref 37–54)
ERYTHROCYTE [DISTWIDTH] IN BLOOD BY AUTOMATED COUNT: 12.9 % (ref 12.3–15.4)
GFR SERPL CREATININE-BSD FRML MDRD: 134 ML/MIN/1.73
GLOBULIN UR ELPH-MCNC: 3.1 GM/DL
GLUCOSE BLD-MCNC: 78 MG/DL (ref 65–99)
GLUCOSE UR STRIP-MCNC: NEGATIVE MG/DL
HCT VFR BLD AUTO: 34.7 % (ref 34–46.6)
HGB BLD-MCNC: 10.9 G/DL (ref 12–15.9)
HGB UR QL STRIP.AUTO: NEGATIVE
HYALINE CASTS UR QL AUTO: ABNORMAL /LPF
KETONES UR QL STRIP: ABNORMAL
LEUKOCYTE ESTERASE UR QL STRIP.AUTO: ABNORMAL
LIPASE SERPL-CCNC: 13 U/L (ref 13–60)
MCH RBC QN AUTO: 25.6 PG (ref 26.6–33)
MCHC RBC AUTO-ENTMCNC: 31.4 G/DL (ref 31.5–35.7)
MCV RBC AUTO: 81.6 FL (ref 79–97)
NITRITE UR QL STRIP: NEGATIVE
PH UR STRIP.AUTO: 7 [PH] (ref 5–8)
PLATELET # BLD AUTO: 255 10*3/MM3 (ref 140–450)
PMV BLD AUTO: 10.5 FL (ref 6–12)
POTASSIUM BLD-SCNC: 2.9 MMOL/L (ref 3.5–5.2)
PROT SERPL-MCNC: 6.6 G/DL (ref 6–8.5)
PROT UR QL STRIP: ABNORMAL
RBC # BLD AUTO: 4.25 10*6/MM3 (ref 3.77–5.28)
RBC # UR: ABNORMAL /HPF
REF LAB TEST METHOD: ABNORMAL
SODIUM BLD-SCNC: 135 MMOL/L (ref 136–145)
SP GR UR STRIP: 1.02 (ref 1–1.03)
SQUAMOUS #/AREA URNS HPF: ABNORMAL /HPF
UROBILINOGEN UR QL STRIP: ABNORMAL
WBC NRBC COR # BLD: 14.39 10*3/MM3 (ref 3.4–10.8)
WBC UR QL AUTO: ABNORMAL /HPF

## 2019-06-07 PROCEDURE — 83690 ASSAY OF LIPASE: CPT | Performed by: OBSTETRICS & GYNECOLOGY

## 2019-06-07 PROCEDURE — 81001 URINALYSIS AUTO W/SCOPE: CPT | Performed by: OBSTETRICS & GYNECOLOGY

## 2019-06-07 PROCEDURE — 85027 COMPLETE CBC AUTOMATED: CPT | Performed by: OBSTETRICS & GYNECOLOGY

## 2019-06-07 PROCEDURE — 25010000002 PROMETHAZINE PER 50 MG: Performed by: OBSTETRICS & GYNECOLOGY

## 2019-06-07 PROCEDURE — 59025 FETAL NON-STRESS TEST: CPT

## 2019-06-07 PROCEDURE — G0463 HOSPITAL OUTPT CLINIC VISIT: HCPCS

## 2019-06-07 PROCEDURE — 80053 COMPREHEN METABOLIC PANEL: CPT | Performed by: OBSTETRICS & GYNECOLOGY

## 2019-06-07 PROCEDURE — 82150 ASSAY OF AMYLASE: CPT | Performed by: OBSTETRICS & GYNECOLOGY

## 2019-06-07 PROCEDURE — 96374 THER/PROPH/DIAG INJ IV PUSH: CPT

## 2019-06-07 RX ORDER — ONDANSETRON 2 MG/ML
4 INJECTION INTRAMUSCULAR; INTRAVENOUS EVERY 6 HOURS PRN
Status: DISCONTINUED | OUTPATIENT
Start: 2019-06-07 | End: 2019-06-08 | Stop reason: HOSPADM

## 2019-06-07 RX ORDER — POTASSIUM CHLORIDE 7.45 MG/ML
10 INJECTION INTRAVENOUS
Status: DISCONTINUED | OUTPATIENT
Start: 2019-06-07 | End: 2019-06-08 | Stop reason: HOSPADM

## 2019-06-07 RX ORDER — POTASSIUM CHLORIDE 1.5 G/1.77G
40 POWDER, FOR SOLUTION ORAL AS NEEDED
Status: DISCONTINUED | OUTPATIENT
Start: 2019-06-07 | End: 2019-06-08 | Stop reason: HOSPADM

## 2019-06-07 RX ORDER — SODIUM CHLORIDE, SODIUM LACTATE, POTASSIUM CHLORIDE, CALCIUM CHLORIDE 600; 310; 30; 20 MG/100ML; MG/100ML; MG/100ML; MG/100ML
150 INJECTION, SOLUTION INTRAVENOUS CONTINUOUS
Status: DISCONTINUED | OUTPATIENT
Start: 2019-06-07 | End: 2019-06-08 | Stop reason: HOSPADM

## 2019-06-07 RX ORDER — POTASSIUM CHLORIDE 750 MG/1
40 CAPSULE, EXTENDED RELEASE ORAL AS NEEDED
Status: DISCONTINUED | OUTPATIENT
Start: 2019-06-07 | End: 2019-06-08 | Stop reason: HOSPADM

## 2019-06-07 RX ORDER — ZOLPIDEM TARTRATE 5 MG/1
10 TABLET ORAL ONCE
Status: COMPLETED | OUTPATIENT
Start: 2019-06-08 | End: 2019-06-07

## 2019-06-07 RX ORDER — PROMETHAZINE HYDROCHLORIDE 25 MG/ML
12.5 INJECTION, SOLUTION INTRAMUSCULAR; INTRAVENOUS EVERY 6 HOURS PRN
Status: DISCONTINUED | OUTPATIENT
Start: 2019-06-07 | End: 2019-06-08 | Stop reason: HOSPADM

## 2019-06-07 RX ADMIN — SODIUM CHLORIDE, POTASSIUM CHLORIDE, SODIUM LACTATE AND CALCIUM CHLORIDE 150 ML/HR: 600; 310; 30; 20 INJECTION, SOLUTION INTRAVENOUS at 20:42

## 2019-06-07 RX ADMIN — ZOLPIDEM TARTRATE 10 MG: 5 TABLET ORAL at 23:29

## 2019-06-07 RX ADMIN — POTASSIUM CHLORIDE 40 MEQ: 750 CAPSULE, EXTENDED RELEASE ORAL at 21:26

## 2019-06-07 RX ADMIN — SODIUM CHLORIDE, POTASSIUM CHLORIDE, SODIUM LACTATE AND CALCIUM CHLORIDE 150 ML/HR: 600; 310; 30; 20 INJECTION, SOLUTION INTRAVENOUS at 19:42

## 2019-06-07 RX ADMIN — PROMETHAZINE HYDROCHLORIDE 12.5 MG: 25 INJECTION INTRAMUSCULAR; INTRAVENOUS at 20:41

## 2019-06-07 RX ADMIN — MAGNESIUM HYDROXIDE 10 ML: 2400 SUSPENSION ORAL at 21:26

## 2019-06-08 NOTE — PROGRESS NOTES
Labourist:    More was able to tolerate oral potassium replacement and water.   She says she feels better now that she was able to also get some sleep.    She has received the oral potassium, Phenergan, IV hydration, tolerated oral intake, and a dose of Milk of magnesium.      I gave her the option of in-patient observation versus going home.  She say at this time, she feels like she would do better going home.      Reviewed warnings and concerns that would warrant her return for further management.    She did request an Ambien prior to discharge.    10 mg of Ambien  D/C home

## 2019-06-08 NOTE — H&P
Sin  Obstetric History and Physical    Chief Complaint   Patient presents with   • VOMITING FOR 2 DAYS; LLQ PAIN; CONSTIPATION       HPI:    Patient is a 19 y.o. female  currently at 34w2d, who presents with a 2 day history of nausea and vomiting.  She says she has not been able to keep anything down during this time.  She also reports problems with constipation.  Her last bowel movement was 2 days ago.  She denies fever or chills.  She also reports left sided abdominal pain.  It started lower, but she says the whole side hurts.  She carries to that side and this has made it worse.  No other specific pregnancy concerns       The following portions of the patients history were reviewed and updated as appropriate: current medications, allergies, past medical history, past surgical history, past family history, past social history and problem list .       Prenatal Information:   Maternal Prenatal Labs  Blood Type No results found for: ABO   Rh Status No results found for: RH   Antibody Screen No results found for: ABSCRN   Gonnorhea No results found for: GCCX   Chlamydia No results found for: CLAMYDCU   RPR No results found for: RPR   Syphilis Antibody No results found for: SYPHILIS   Rubella No results found for: RUBELLAIGGIN   Hepatitis B Surface Antigen No results found for: HEPBSAG   HIV-1 Antibody No results found for: LABHIV1   Hepatitis C Antibody No results found for: HEPCAB   Rapid Urin Drug Screen No results found for: AMPMETHU, BARBITSCNUR, LABBENZSCN, LABMETHSCN, LABOPIASCN, THCURSCR, COCAINEUR, AMPHETSCREEN, PROPOXSCN, BUPRENORSCNU, METAMPSCNUR, OXYCODONESCN, TRICYCLICSCN   Group B Strep Culture No results found for: GBSANTIGEN           External Prenatal Results     Pregnancy Outside Results - Transcribed From Office Records - See Scanned Records For Details     Test Value Date Time    Hgb 10.9 g/dL 19    Hct 34.7 % 19    ABO O  18 1151    Rh Positive  18  1151    Antibody Screen Negative  18 1151    Glucose Fasting GTT 84 mg/dL 19 1114    Glucose Tolerance Test 1 hour 167 mg/dL 19 1224    Glucose Tolerance Test 3 hour 103 mg/dL 19 1425    Gonorrhea (discrete)       Chlamydia (discrete)       RPR Non-Reactive  18 1151    VDRL       Syphilis Antibody       Rubella 251.7 IU/mL 18 1151      Immune  18 1151    HBsAg Non-Reactive  18 1151    Herpes Simplex Virus PCR       Herpes Simplex VIrus Culture       HIV Non-Reactive  18 1151    Hep C RNA Quant PCR       Hep C Antibody Non-Reactive  18 1151    AFP       Group B Strep       GBS Susceptibility to Clindamycin       GBS Susceptibility to Erythromycin       Fetal Fibronectin       Genetic Testing, Maternal Blood             Drug Screening     Test Value Date Time    Urine Drug Screen       Amphetamine Screen Negative  19 1756    Barbiturate Screen Negative  19 1756    Benzodiazepine Screen Negative  19 1756    Methadone Screen Negative  19 1756    Phencyclidine Screen Negative  19 1756    Opiates Screen Negative  19 1756    THC Screen Negative  19 1756    Cocaine Screen       Propoxyphene Screen Negative  19 1756    Buprenorphine Screen Negative  19 1756    Methamphetamine Screen       Oxycodone Screen Negative  19 1756    Tricyclic Antidepressants Screen Negative  19 1756                  Past OB History:     Obstetric History       T0      L0     SAB0   TAB0   Ectopic0   Molar0   Multiple0   Live Births0       # Outcome Date GA Lbr Aftab/2nd Weight Sex Delivery Anes PTL Lv   1 Current               Obstetric Comments   Denies history of STIs       Past Medical History: Past Medical History:   Diagnosis Date   • Seasonal allergies       Past Surgical History Past Surgical History:   Procedure Laterality Date   • FOREIGN BODY REMOVAL      glass removed from foot   • WISDOM TOOTH  EXTRACTION 2011      Family History: Family History   Problem Relation Age of Onset   • Cancer Mother    • Obesity Mother    • Fibroids Mother    • Ovarian cancer Maternal Grandmother    • Breast cancer Maternal Aunt         older than 40s   • Crohn's disease Maternal Aunt    • Colon cancer Maternal Uncle       Social History:  reports that she has never smoked. She has never used smokeless tobacco.   reports that she does not drink alcohol.   reports that she uses drugs. Drug: Marijuana.        Review of Systems  Denies HA, CP, Shortness of air, muscle weakness,      and rashes      Objective     Vital Signs Range for the last 24 hours  Temperature: Temp:  [98.4 °F (36.9 °C)] 98.4 °F (36.9 °C)   Temp Source:  Oral   BP: BP: (125)/(89) 125/89   Pulse:  92   Respirations: Resp:  [16] 16   SPO2:     O2 Amount (l/min):     O2 Devices     Weight:       Physical Examination: General appearance - Alert with no acute distress.   Chest - clear to auscultation, no wheezes, rales or rhonchi, symmetric air entry  Heart - S1 and S2 normal, no murmurs noted  Abdomen - soft, nondistended, no masses or organomegaly  no rebound tenderness noted  bowel sounds present, but hypoactive, No RUQ pain.  Generalized tenderness over the entire left side.  Gravid uterus with infant on the left.   Extremities - pedal edema 1+                          Fetal Heart Rate Assessment   Method: Fetal HR Assessment Method: external   Beats/min: Fetal HR (beats/min): 150   Baseline: Fetal Heart Baseline Rate: normal range   Varibility: Fetal HR Variability: moderate (amplitude range 6 to 25 bpm)   Accels: Fetal HR Accelerations: greater than/equal to 10 bpm (32 wks gest or less), lasts at least 10 seconds (32 wks gest or less)   Decels: Fetal HR Decelerations: absent   Tracing Category:       Uterine Assessment   Method: Method: external tocotransducer   Frequency (min): Contraction Frequency (Minutes): x1   Ctx Count in 10 min:     Duration:      Intensity: Contraction Intensity: no contractions   Intensity by IUPC:     Resting Tone: Uterine Resting Tone: soft by palpation   Resting Tone by IUPC:     [place] Units:      NST                     Indication:  triage  Start time: 20:00                End time: 20:30  15 x 15 accels x 2  Yes  No decels  Baseline  150s  Reactive NST - Yes     Laboratory Results:   Lab Results   Component Value Date     06/07/2019    HGB 10.9 (L) 06/07/2019    HCT 34.7 06/07/2019    WBC 14.39 (H) 06/07/2019     Lab Results   Component Value Date     (L) 06/07/2019    K 2.9 (L) 06/07/2019    CL 97 (L) 06/07/2019    CO2 22.0 06/07/2019    BUN 6 06/07/2019    CREATININE 0.57 06/07/2019    GLUCOSE 78 06/07/2019    ALBUMIN 3.50 06/07/2019    CALCIUM 8.9 06/07/2019    AST 19 06/07/2019    ALT 14 06/07/2019    BILITOT 0.7 06/07/2019     Lab Results   Component Value Date    AMYLASE 59 06/07/2019    LIPASE 13 06/07/2019     Lab Results   Component Value Date    SQUAMEPIUA 7-12 (A) 06/07/2019    SPECGRAVUR 1.020 06/07/2019    KETONESU >=160 mg/dL (4+) (A) 06/07/2019    BLOODU Negative 06/07/2019    LEUKOCYTESUR Small (1+) (A) 06/07/2019    NITRITEU Negative 06/07/2019    RBCUA 0-2 06/07/2019    WBCUA 6-12 (A) 06/07/2019    BACTERIA Trace 06/07/2019           Assessment:  1. Intrauterine pregnancy at 34w2d weeks gestation with reactive fetal status  2.  N/V   3.  Constipation  4.  Hypokalemia     Plan:  1. Reassuring fetal status  2. IV hydration, antiemetics  3.  Replace Potassium  4.  Milk of Magnesium for Constipation   5.  Admit as observation       Bennett Santiago MD  6/7/2019  8:40 PM

## 2019-06-12 ENCOUNTER — ROUTINE PRENATAL (OUTPATIENT)
Dept: OBSTETRICS AND GYNECOLOGY | Facility: CLINIC | Age: 19
End: 2019-06-12

## 2019-06-12 VITALS — SYSTOLIC BLOOD PRESSURE: 114 MMHG | WEIGHT: 183.2 LBS | BODY MASS INDEX: 34.05 KG/M2 | DIASTOLIC BLOOD PRESSURE: 88 MMHG

## 2019-06-12 DIAGNOSIS — O09.93 HIGH-RISK PREGNANCY IN THIRD TRIMESTER: ICD-10-CM

## 2019-06-12 DIAGNOSIS — O23.43 URINARY TRACT INFECTION IN MOTHER DURING THIRD TRIMESTER OF PREGNANCY: Primary | ICD-10-CM

## 2019-06-12 PROCEDURE — 99213 OFFICE O/P EST LOW 20 MIN: CPT | Performed by: OBSTETRICS & GYNECOLOGY

## 2019-06-12 NOTE — PROGRESS NOTES
Chief Complaint   Patient presents with   • Routine Prenatal Visit     pt states no c/o       HPI: More is a  currently at 35w0d who today reports the following:  Contractions - No; Leaking - No; Vaginal bleeding -  No; Swelling of extremities - No.    ROS:  GI: Nausea - No; Constipation - No; Diarrhea - No    Neuro: Headache - No; Visual change - No      EXAM:  Vitals: See prenatal flowsheet   Abdomen: See prenatal flowsheet   Urine glucose/protein: See prenatal flowsheet   Pelvic: See prenatal flowsheet   MDM:   Impression: 1. Supervision of high risk pregnancy  2. History of biliary colic/GB sludge- symptomaticaly improved after inpatient stay   3. History of UTI during recent admission only sensitive to rocephin - s/p treatment   4. Fetal VSD - small muscular seen last week. Will need  fetal echo    Tests done today: 1. Urine culture JAZMINE   Topics discussed: 1. Continue with PNV's  2. Prenatal labs reviewed  3.  labor signs and symptoms  4. symptoms of preeclampsia   Tests scheduled today for her next visit:   none. Will need GBS testing at next visit.

## 2019-06-13 ENCOUNTER — APPOINTMENT (OUTPATIENT)
Dept: LAB | Facility: HOSPITAL | Age: 19
End: 2019-06-13

## 2019-06-13 PROCEDURE — 87086 URINE CULTURE/COLONY COUNT: CPT | Performed by: OBSTETRICS & GYNECOLOGY

## 2019-06-14 LAB — BACTERIA SPEC AEROBE CULT: NORMAL

## 2019-06-27 ENCOUNTER — ROUTINE PRENATAL (OUTPATIENT)
Dept: OBSTETRICS AND GYNECOLOGY | Facility: CLINIC | Age: 19
End: 2019-06-27

## 2019-06-27 VITALS — DIASTOLIC BLOOD PRESSURE: 82 MMHG | SYSTOLIC BLOOD PRESSURE: 124 MMHG | WEIGHT: 186.6 LBS | BODY MASS INDEX: 34.69 KG/M2

## 2019-06-27 DIAGNOSIS — O16.9 ELEVATED BLOOD PRESSURE AFFECTING PREGNANCY, ANTEPARTUM: ICD-10-CM

## 2019-06-27 DIAGNOSIS — Z3A.37 37 WEEKS GESTATION OF PREGNANCY: Primary | ICD-10-CM

## 2019-06-27 DIAGNOSIS — Z3A.37 37 WEEKS GESTATION OF PREGNANCY: ICD-10-CM

## 2019-06-27 DIAGNOSIS — O99.810 ABNORMAL O'SULLIVAN GLUCOSE CHALLENGE TEST, ANTEPARTUM: ICD-10-CM

## 2019-06-27 DIAGNOSIS — O23.43 URINARY TRACT INFECTION IN MOTHER DURING THIRD TRIMESTER OF PREGNANCY: ICD-10-CM

## 2019-06-27 DIAGNOSIS — O35.BXX0 ANOMALY OF HEART OF FETUS AFFECTING PREGNANCY, ANTEPARTUM, SINGLE OR UNSPECIFIED FETUS: ICD-10-CM

## 2019-06-27 LAB — GP B STREP RRNA SPEC QL PROBE: NORMAL

## 2019-06-27 PROCEDURE — 99213 OFFICE O/P EST LOW 20 MIN: CPT | Performed by: OBSTETRICS & GYNECOLOGY

## 2019-06-27 NOTE — PROGRESS NOTES
Chief Complaint   Patient presents with   • Routine Prenatal Visit     GBS today; no complaints       HPI: More is a  currently at 37w1d who today reports the following:  Contractions - No; Leaking - No; Vaginal bleeding -  No; Swelling of extremities - No.    ROS:  GI: Nausea - No; Constipation - No; Diarrhea - No    Neuro: Headache - No; Visual change - No      EXAM:  Vitals: See prenatal flowsheet   Abdomen: See prenatal flowsheet   Urine glucose/protein: See prenatal flowsheet   Pelvic: See prenatal flowsheet   MDM:   Impression: 1. Supervision of low risk pregnancy  2. History of biliary colic/GB sludge- symptomaticaly improved after inpatient stay   3. History of UTI during recent admission only sensitive to rocephin - s/p last visit   4. Fetal VSD - small muscular seen last week. Will need  fetal echo    Tests done today: 1. GBS testing   Topics discussed: 1. Continue with PNV's  2. Prenatal labs reviewed  3. labor signs and symptoms  4. fetal kick count instructions   Tests scheduled today for her next visit:   none

## 2019-07-01 ENCOUNTER — DOCUMENTATION (OUTPATIENT)
Dept: OBSTETRICS AND GYNECOLOGY | Facility: CLINIC | Age: 19
End: 2019-07-01

## 2019-07-01 PROBLEM — O99.820 GBS (GROUP B STREPTOCOCCUS CARRIER), +RV CULTURE, CURRENTLY PREGNANT: Status: ACTIVE | Noted: 2019-07-01

## 2019-07-02 ENCOUNTER — HOSPITAL ENCOUNTER (INPATIENT)
Facility: HOSPITAL | Age: 19
LOS: 5 days | Discharge: HOME | End: 2019-07-07
Attending: OBSTETRICS & GYNECOLOGY | Admitting: OBSTETRICS & GYNECOLOGY

## 2019-07-02 ENCOUNTER — ROUTINE PRENATAL (OUTPATIENT)
Dept: OBSTETRICS AND GYNECOLOGY | Facility: CLINIC | Age: 19
End: 2019-07-02

## 2019-07-02 VITALS — WEIGHT: 189.4 LBS | DIASTOLIC BLOOD PRESSURE: 94 MMHG | BODY MASS INDEX: 35.21 KG/M2 | SYSTOLIC BLOOD PRESSURE: 140 MMHG

## 2019-07-02 DIAGNOSIS — O16.3 ELEVATED BLOOD PRESSURE AFFECTING PREGNANCY IN THIRD TRIMESTER, ANTEPARTUM: ICD-10-CM

## 2019-07-02 DIAGNOSIS — Z34.03 ENCOUNTER FOR SUPERVISION OF NORMAL FIRST PREGNANCY IN THIRD TRIMESTER: Primary | ICD-10-CM

## 2019-07-02 PROBLEM — O13.3 GESTATIONAL HYPERTENSION WITHOUT SIGNIFICANT PROTEINURIA IN THIRD TRIMESTER: Status: ACTIVE | Noted: 2019-07-02

## 2019-07-02 LAB
ABO GROUP BLD: NORMAL
ALP SERPL-CCNC: 201 U/L (ref 39–117)
ALT SERPL W P-5'-P-CCNC: 11 U/L (ref 1–33)
AST SERPL-CCNC: 17 U/L (ref 1–32)
BILIRUB SERPL-MCNC: 0.2 MG/DL (ref 0.2–1.2)
BLD GP AB SCN SERPL QL: NEGATIVE
CREAT BLD-MCNC: 0.66 MG/DL (ref 0.57–1)
DEPRECATED RDW RBC AUTO: 38.3 FL (ref 37–54)
ERYTHROCYTE [DISTWIDTH] IN BLOOD BY AUTOMATED COUNT: 13.4 % (ref 12.3–15.4)
EXPIRATION DATE: 0
HCT VFR BLD AUTO: 36.1 % (ref 34–46.6)
HGB BLD-MCNC: 11.3 G/DL (ref 12–15.9)
LDH SERPL-CCNC: 215 U/L (ref 135–214)
Lab: 0
MCH RBC QN AUTO: 25 PG (ref 26.6–33)
MCHC RBC AUTO-ENTMCNC: 31.3 G/DL (ref 31.5–35.7)
MCV RBC AUTO: 79.9 FL (ref 79–97)
PLATELET # BLD AUTO: 255 10*3/MM3 (ref 140–450)
PMV BLD AUTO: 11.1 FL (ref 6–12)
PROT UR STRIP-MCNC: ABNORMAL MG/DL
RBC # BLD AUTO: 4.52 10*6/MM3 (ref 3.77–5.28)
RH BLD: POSITIVE
T&S EXPIRATION DATE: NORMAL
URATE SERPL-MCNC: 7.2 MG/DL (ref 2.4–5.7)
WBC NRBC COR # BLD: 7.71 10*3/MM3 (ref 3.4–10.8)

## 2019-07-02 PROCEDURE — 25010000002 PENICILLIN G POTASSIUM PER 600000 UNITS: Performed by: OBSTETRICS & GYNECOLOGY

## 2019-07-02 PROCEDURE — 84460 ALANINE AMINO (ALT) (SGPT): CPT | Performed by: OBSTETRICS & GYNECOLOGY

## 2019-07-02 PROCEDURE — 59025 FETAL NON-STRESS TEST: CPT

## 2019-07-02 PROCEDURE — 86901 BLOOD TYPING SEROLOGIC RH(D): CPT | Performed by: OBSTETRICS & GYNECOLOGY

## 2019-07-02 PROCEDURE — 82565 ASSAY OF CREATININE: CPT | Performed by: OBSTETRICS & GYNECOLOGY

## 2019-07-02 PROCEDURE — 86900 BLOOD TYPING SEROLOGIC ABO: CPT | Performed by: OBSTETRICS & GYNECOLOGY

## 2019-07-02 PROCEDURE — 83615 LACTATE (LD) (LDH) ENZYME: CPT | Performed by: OBSTETRICS & GYNECOLOGY

## 2019-07-02 PROCEDURE — 86850 RBC ANTIBODY SCREEN: CPT | Performed by: OBSTETRICS & GYNECOLOGY

## 2019-07-02 PROCEDURE — 85027 COMPLETE CBC AUTOMATED: CPT | Performed by: OBSTETRICS & GYNECOLOGY

## 2019-07-02 PROCEDURE — 81002 URINALYSIS NONAUTO W/O SCOPE: CPT | Performed by: OBSTETRICS & GYNECOLOGY

## 2019-07-02 PROCEDURE — 84450 TRANSFERASE (AST) (SGOT): CPT | Performed by: OBSTETRICS & GYNECOLOGY

## 2019-07-02 PROCEDURE — 99213 OFFICE O/P EST LOW 20 MIN: CPT | Performed by: OBSTETRICS & GYNECOLOGY

## 2019-07-02 PROCEDURE — 82247 BILIRUBIN TOTAL: CPT | Performed by: OBSTETRICS & GYNECOLOGY

## 2019-07-02 PROCEDURE — 84075 ASSAY ALKALINE PHOSPHATASE: CPT | Performed by: OBSTETRICS & GYNECOLOGY

## 2019-07-02 PROCEDURE — 59200 INSERT CERVICAL DILATOR: CPT | Performed by: OBSTETRICS & GYNECOLOGY

## 2019-07-02 PROCEDURE — 84550 ASSAY OF BLOOD/URIC ACID: CPT | Performed by: OBSTETRICS & GYNECOLOGY

## 2019-07-02 RX ORDER — SODIUM CHLORIDE 0.9 % (FLUSH) 0.9 %
3 SYRINGE (ML) INJECTION EVERY 12 HOURS SCHEDULED
Status: DISCONTINUED | OUTPATIENT
Start: 2019-07-02 | End: 2019-07-02

## 2019-07-02 RX ORDER — OXYTOCIN-SODIUM CHLORIDE 0.9% IV SOLN 30 UNIT/500ML 30-0.9/5 UT/ML-%
2-24 SOLUTION INTRAVENOUS
Status: DISCONTINUED | OUTPATIENT
Start: 2019-07-02 | End: 2019-07-03

## 2019-07-02 RX ORDER — LABETALOL HYDROCHLORIDE 5 MG/ML
20-80 INJECTION, SOLUTION INTRAVENOUS
Status: DISCONTINUED | OUTPATIENT
Start: 2019-07-02 | End: 2019-07-03

## 2019-07-02 RX ORDER — PENICILLIN G 3000000 [IU]/50ML
3 INJECTION, SOLUTION INTRAVENOUS EVERY 4 HOURS
Status: DISCONTINUED | OUTPATIENT
Start: 2019-07-02 | End: 2019-07-03

## 2019-07-02 RX ORDER — LIDOCAINE HYDROCHLORIDE 10 MG/ML
5 INJECTION, SOLUTION EPIDURAL; INFILTRATION; INTRACAUDAL; PERINEURAL AS NEEDED
Status: DISCONTINUED | OUTPATIENT
Start: 2019-07-02 | End: 2019-07-02

## 2019-07-02 RX ORDER — MISOPROSTOL 100 MCG
25 TABLET ORAL
Status: DISPENSED | OUTPATIENT
Start: 2019-07-02 | End: 2019-07-02

## 2019-07-02 RX ORDER — SODIUM CHLORIDE, SODIUM LACTATE, POTASSIUM CHLORIDE, CALCIUM CHLORIDE 600; 310; 30; 20 MG/100ML; MG/100ML; MG/100ML; MG/100ML
125 INJECTION, SOLUTION INTRAVENOUS CONTINUOUS
Status: DISCONTINUED | OUTPATIENT
Start: 2019-07-02 | End: 2019-07-03

## 2019-07-02 RX ORDER — SODIUM CHLORIDE 0.9 % (FLUSH) 0.9 %
1-10 SYRINGE (ML) INJECTION AS NEEDED
Status: DISCONTINUED | OUTPATIENT
Start: 2019-07-02 | End: 2019-07-02

## 2019-07-02 RX ADMIN — PENICILLIN G 3 MILLION UNITS: 3000000 INJECTION, SOLUTION INTRAVENOUS at 20:33

## 2019-07-02 RX ADMIN — SODIUM CHLORIDE 5 MILLION UNITS: 900 INJECTION INTRAVENOUS at 12:16

## 2019-07-02 RX ADMIN — MISOPROSTOL 25 MCG: 100 TABLET ORAL at 12:52

## 2019-07-02 RX ADMIN — SODIUM CHLORIDE, POTASSIUM CHLORIDE, SODIUM LACTATE AND CALCIUM CHLORIDE 125 ML/HR: 600; 310; 30; 20 INJECTION, SOLUTION INTRAVENOUS at 17:04

## 2019-07-02 RX ADMIN — PENICILLIN G 3 MILLION UNITS: 3000000 INJECTION, SOLUTION INTRAVENOUS at 17:03

## 2019-07-02 RX ADMIN — OXYTOCIN 2 MILLI-UNITS/MIN: 10 INJECTION, SOLUTION INTRAMUSCULAR; INTRAVENOUS at 23:04

## 2019-07-02 RX ADMIN — OXYTOCIN 2 MILLI-UNITS/MIN: 10 INJECTION, SOLUTION INTRAMUSCULAR; INTRAVENOUS at 20:31

## 2019-07-02 NOTE — H&P
VALERIE Vogt  More Gaxiola  : 2000  MRN: [id number]  CSN: [id number]    History and Physical    Subjective   More Gaxiola is a 19 y.o. year old  with an Estimated Date of Delivery: 19 currently at 37w6d presenting with complaints of swelling. Denies LOF, VB, HA, VC. She was seen in the office and had a mild range BP with trace urine protein.     Prenatal care has been with Dr. Rodriguez.  It has been complicated by GBS carrier and Small muscular VSD.    Obstetric History       T0      L0     SAB0   TAB0   Ectopic0   Molar0   Multiple0   Live Births0       # Outcome Date GA Lbr Aftab/2nd Weight Sex Delivery Anes PTL Lv   1 Current               Obstetric Comments   Denies history of STIs     Past Medical History:   Diagnosis Date   • Seasonal allergies      Past Surgical History:   Procedure Laterality Date   • FOREIGN BODY REMOVAL      glass removed from foot   • WISDOM TOOTH EXTRACTION         Current Facility-Administered Medications:   •  labetalol (NORMODYNE,TRANDATE) injection 20-80 mg, 20-80 mg, Intravenous, Q10 Min PRN, Eli Rodriguez MD  •  misoprostol (CYTOTEC) split tablet 25 mcg, 25 mcg, Vaginal, Q4H, Eli Rodriguez MD, 25 mcg at 19 1252  •  oxytocin in sodium chloride (PITOCIN) 30 UNIT/500ML infusion solution, 2-24 galina-units/min, Intravenous, Titrated, Eli Rodriguez MD  •  [COMPLETED] penicillin g 5 mu/100 mL 0.9% NS IVPB (mbp), 5 Million Units, Intravenous, Once, 5 Million Units at 19 1216 **FOLLOWED BY** penicillin G in iso-osmotic dextrose IVPB 3 million units (premix), 3 Million Units, Intravenous, Q4H, Eli Rodriguez MD    No Known Allergies  Social History    Tobacco Use      Smoking status: Former Smoker        Types: Cigarettes        Start date: 10/2018      Smokeless tobacco: Never Used    Review of Systems      Objective   /100   Pulse 93   Temp 98 °F (36.7 °C) (Oral)   Resp 16   Ht 154.9 cm  "(61\")   Wt 85.7 kg (189 lb)   LMP 10/10/2018   Breastfeeding? No   BMI 35.71 kg/m²   General: well developed; well nourished  no acute distress   Heart: Not performed.   Lungs: breathing is unlabored   Abdomen: soft, non-tender; no masses  no umbilical or inguinal hernias are present  no hepato-splenomegaly   FHT's: reassuring and category 1      Cervix: was checked (by me): 1 cm / 50 % / -3   Presentation: cephalic   Contractions: none   Extremities: extremities normal, atraumatic, no cyanosis or edema     Prenatal Labs  Lab Results   Component Value Date    HGB 11.3 (L) 07/02/2019    RUBELLAABIGG 251.7 12/12/2018    RUBELLAABIGG Immune 12/12/2018    HEPBSAG Non-Reactive 12/12/2018    ABSCRN Negative 07/02/2019    ZIA4OSN4 Non-Reactive 12/12/2018    HEPCVIRUSABY Non-Reactive 12/12/2018     05/15/2019    GGTFASTING 84 05/26/2019    QHB3DTRB 167 05/26/2019    GCG9LWBF 157 (H) 05/26/2019    ELJ3HKOW 103 05/26/2019    STREPGPB pos 06/27/2019    URINECX >100,000 CFU/mL Mixed Di Isolated 06/13/2019       Last Labs  Lab Results   Component Value Date    HGB 11.3 (L) 07/02/2019     07/02/2019    AST 17 07/02/2019    ALT 11 07/02/2019     (H) 07/02/2019    URICACID 7.2 (H) 07/02/2019    CREATININE 0.66 07/02/2019        Imaging   No data reviewed        Assessment   1. IUP at 37w6d  2. Gestational hypertension   3. GBS+  4. Small muscular fetal VSD      Plan   1. Induction given GHTN at >37 weeks with mckeon and miso x2 then start pitocin  2. PCN for GBS ppx  3. AROM with descent of presenting part  4. Okay for epidural at patient request   5. Continue monitor for s/s of severe features of preeclampsia. Reviewed utility of magnesium with patient if this became a concern.     Eli Rodriguez MD  7/2/2019  1:02 PM      "

## 2019-07-02 NOTE — PROGRESS NOTES
19 y.o.  G1 po at 37w   OB History      Para Term  AB Living    1 0 0 0 0 0    SAB TAB Ectopic Molar Multiple Live Births    0 0 0 0 0 0        Obstetric Comments    Denies history of STIs       Presents as an induction of labour due to gestational hypertension and unfavorable cervix  Her primary OB requests a Austin Bulb placement to initiate the induction of labour.    Fetal Heart Rate Assessment   Method: Fetal HR Assessment Method: external   Beats/min: Fetal HR (beats/min): 135   Baseline: Fetal Heart Baseline Rate: normal range   Varibility: Fetal HR Variability: moderate (amplitude range 6 to 25 bpm)   Accels: Fetal HR Accelerations: greater than/equal to 15 bpm, lasting at least 15 seconds   Decels: Fetal HR Decelerations: absent   Tracing Category:       TOCO  SVE 1cm -3  vtx  Via U/S    A Austin Bulb was placed without difficulties with 60 cc of sterile saline.  The patient tolerated the procedure well.

## 2019-07-02 NOTE — PROGRESS NOTES
Chief Complaint   Patient presents with   • Routine Prenatal Visit     pt states that she is starting to have some pressure and that her feet are swelling.       HPI: More is a  currently at 37w6d who today reports the following:  Contractions - YES - but less than 4/hour AND no associated change in vaginal discharge; Leaking - No; Vaginal bleeding -  No; Swelling of extremities - No.    ROS:  GI: Nausea - No; Constipation - No; Diarrhea - No    Neuro: Headache - No; Visual change - No      EXAM:  Vitals: See prenatal flowsheet   Abdomen: See prenatal flowsheet   Urine glucose/protein: See prenatal flowsheet   Pelvic: See prenatal flowsheet   MDM:   Impression: 1. Supervision of low risk pregnancy  2. Fetal VSD. Will need  fetal echo   3. Mild range blood pressure in the office with trace urine protein    Tests done today: 1. none   Topics discussed: 1. Continue with PNV's  2. Prenatal labs reviewed  3. to labor vega for BP monitoring and possible IOL for GHTN   Tests scheduled today for her next visit:   none

## 2019-07-03 ENCOUNTER — ANESTHESIA EVENT (OUTPATIENT)
Dept: LABOR AND DELIVERY | Facility: HOSPITAL | Age: 19
End: 2019-07-03

## 2019-07-03 ENCOUNTER — ANESTHESIA (OUTPATIENT)
Dept: LABOR AND DELIVERY | Facility: HOSPITAL | Age: 19
End: 2019-07-03

## 2019-07-03 PROBLEM — O16.9 ELEVATED BLOOD PRESSURE AFFECTING PREGNANCY, ANTEPARTUM: Status: RESOLVED | Noted: 2019-04-17 | Resolved: 2019-07-03

## 2019-07-03 PROBLEM — K83.8 BILIARY SLUDGE: Status: RESOLVED | Noted: 2019-04-29 | Resolved: 2019-07-03

## 2019-07-03 PROBLEM — O23.43 URINARY TRACT INFECTION IN MOTHER DURING THIRD TRIMESTER OF PREGNANCY: Status: RESOLVED | Noted: 2019-04-29 | Resolved: 2019-07-03

## 2019-07-03 PROBLEM — O41.1230 CHORIOAMNIONITIS IN THIRD TRIMESTER: Status: RESOLVED | Noted: 2019-07-03 | Resolved: 2019-07-03

## 2019-07-03 PROBLEM — R10.9 ABDOMINAL PAIN DURING PREGNANCY IN THIRD TRIMESTER: Status: RESOLVED | Noted: 2019-04-23 | Resolved: 2019-07-03

## 2019-07-03 PROBLEM — O13.3 GESTATIONAL HYPERTENSION WITHOUT SIGNIFICANT PROTEINURIA IN THIRD TRIMESTER: Status: RESOLVED | Noted: 2019-07-02 | Resolved: 2019-07-03

## 2019-07-03 PROBLEM — O99.820 GBS (GROUP B STREPTOCOCCUS CARRIER), +RV CULTURE, CURRENTLY PREGNANT: Status: RESOLVED | Noted: 2019-07-01 | Resolved: 2019-07-03

## 2019-07-03 PROBLEM — R10.11 RIGHT UPPER QUADRANT PAIN: Status: RESOLVED | Noted: 2019-04-25 | Resolved: 2019-07-03

## 2019-07-03 PROBLEM — O41.1230 CHORIOAMNIONITIS IN THIRD TRIMESTER: Status: ACTIVE | Noted: 2019-07-03

## 2019-07-03 PROBLEM — O99.810 ABNORMAL O'SULLIVAN GLUCOSE CHALLENGE TEST, ANTEPARTUM: Status: RESOLVED | Noted: 2019-05-15 | Resolved: 2019-07-03

## 2019-07-03 PROBLEM — Z34.90 PREGNANCY: Status: RESOLVED | Noted: 2018-12-17 | Resolved: 2019-07-03

## 2019-07-03 PROBLEM — R11.2 INTRACTABLE VOMITING WITH NAUSEA: Status: RESOLVED | Noted: 2019-04-25 | Resolved: 2019-07-03

## 2019-07-03 PROBLEM — O26.893 ABDOMINAL PAIN DURING PREGNANCY IN THIRD TRIMESTER: Status: RESOLVED | Noted: 2019-04-23 | Resolved: 2019-07-03

## 2019-07-03 LAB
ATMOSPHERIC PRESS: ABNORMAL MMHG
ATMOSPHERIC PRESS: ABNORMAL MMHG
BASE EXCESS BLDCOA CALC-SCNC: -5.1 MMOL/L (ref 0–2)
BASE EXCESS BLDCOV CALC-SCNC: -5.2 MMOL/L (ref 0–2)
BDY SITE: ABNORMAL
BDY SITE: ABNORMAL
BODY TEMPERATURE: 37 C
BODY TEMPERATURE: 37 C
CO2 BLDA-SCNC: 23 MMOL/L (ref 23–27)
CO2 BLDA-SCNC: 24.5 MMOL/L (ref 23–27)
HCO3 BLDCOA-SCNC: 22.8 MMOL/L (ref 16.9–20.5)
HCO3 BLDCOV-SCNC: 21.6 MMOL/L (ref 18.6–21.4)
HGB BLDA-MCNC: 14.8 G/DL (ref 14–18)
HGB BLDA-MCNC: 14.9 G/DL (ref 14–18)
HOROWITZ INDEX BLD+IHG-RTO: 21 %
HOROWITZ INDEX BLD+IHG-RTO: 21 %
MODALITY: ABNORMAL
MODALITY: ABNORMAL
NOTE: ABNORMAL
NOTE: ABNORMAL
PCO2 BLDCOA: 52.7 MMHG (ref 43.3–54.9)
PCO2 BLDCOV: 45.6 MM HG
PH BLDCOA: 7.25 PH UNITS (ref 7.22–7.3)
PH BLDCOV: 7.28 PH UNITS
PO2 BLDCOA: 0.1 MMHG (ref 11.5–43.3)
PO2 BLDCOV: 9 MM HG
SAO2 % BLDCOA: ABNORMAL %
SAO2 % BLDCOA: ABNORMAL % (ref 92–98)
SAO2 % BLDCOV: 11.2 %

## 2019-07-03 PROCEDURE — 25010000002 MIDAZOLAM PER 1 MG: Performed by: ANESTHESIOLOGY

## 2019-07-03 PROCEDURE — 25010000002 ONDANSETRON PER 1 MG: Performed by: ANESTHESIOLOGY

## 2019-07-03 PROCEDURE — 59515 CESAREAN DELIVERY: CPT | Performed by: OBSTETRICS & GYNECOLOGY

## 2019-07-03 PROCEDURE — 25010000002 PENICILLIN G POTASSIUM PER 600000 UNITS: Performed by: OBSTETRICS & GYNECOLOGY

## 2019-07-03 PROCEDURE — 25010000002 FENTANYL CITRATE (PF) 100 MCG/2ML SOLUTION: Performed by: ANESTHESIOLOGY

## 2019-07-03 PROCEDURE — C1755 CATHETER, INTRASPINAL: HCPCS

## 2019-07-03 PROCEDURE — 25010000002 CHLOROPROCAINE HCL (PF) 3 % SOLUTION: Performed by: ANESTHESIOLOGY

## 2019-07-03 PROCEDURE — 25010000002 METHYLERGONOVINE MALEATE PER 0.2 MG: Performed by: ANESTHESIOLOGY

## 2019-07-03 PROCEDURE — 25010000003 MORPHINE PER 10 MG: Performed by: ANESTHESIOLOGY

## 2019-07-03 PROCEDURE — 51703 INSERT BLADDER CATH COMPLEX: CPT

## 2019-07-03 PROCEDURE — 59025 FETAL NON-STRESS TEST: CPT

## 2019-07-03 PROCEDURE — C1755 CATHETER, INTRASPINAL: HCPCS | Performed by: ANESTHESIOLOGY

## 2019-07-03 PROCEDURE — 25010000002 ROPIVACAINE PER 1 MG: Performed by: ANESTHESIOLOGY

## 2019-07-03 PROCEDURE — 25010000002 METOCLOPRAMIDE PER 10 MG: Performed by: ANESTHESIOLOGY

## 2019-07-03 PROCEDURE — 82805 BLOOD GASES W/O2 SATURATION: CPT

## 2019-07-03 RX ORDER — MISOPROSTOL 200 UG/1
800 TABLET ORAL AS NEEDED
Status: DISCONTINUED | OUTPATIENT
Start: 2019-07-03 | End: 2019-07-04 | Stop reason: HOSPADM

## 2019-07-03 RX ORDER — ONDANSETRON 2 MG/ML
4 INJECTION INTRAMUSCULAR; INTRAVENOUS ONCE AS NEEDED
Status: DISCONTINUED | OUTPATIENT
Start: 2019-07-03 | End: 2019-07-03

## 2019-07-03 RX ORDER — DIPHENHYDRAMINE HYDROCHLORIDE 50 MG/ML
12.5 INJECTION INTRAMUSCULAR; INTRAVENOUS EVERY 8 HOURS PRN
Status: DISCONTINUED | OUTPATIENT
Start: 2019-07-03 | End: 2019-07-03

## 2019-07-03 RX ORDER — CHLOROPROCAINE HYDROCHLORIDE 30 MG/ML
INJECTION, SOLUTION EPIDURAL; INFILTRATION; INTRACAUDAL; PERINEURAL AS NEEDED
Status: DISCONTINUED | OUTPATIENT
Start: 2019-07-03 | End: 2019-07-03 | Stop reason: SURG

## 2019-07-03 RX ORDER — PROMETHAZINE HYDROCHLORIDE 25 MG/ML
12.5 INJECTION, SOLUTION INTRAMUSCULAR; INTRAVENOUS EVERY 6 HOURS PRN
Status: DISCONTINUED | OUTPATIENT
Start: 2019-07-03 | End: 2019-07-04 | Stop reason: HOSPADM

## 2019-07-03 RX ORDER — OXYTOCIN-SODIUM CHLORIDE 0.9% IV SOLN 30 UNIT/500ML 30-0.9/5 UT/ML-%
85 SOLUTION INTRAVENOUS ONCE
Status: DISCONTINUED | OUTPATIENT
Start: 2019-07-03 | End: 2019-07-04 | Stop reason: HOSPADM

## 2019-07-03 RX ORDER — FAMOTIDINE 10 MG/ML
INJECTION, SOLUTION INTRAVENOUS AS NEEDED
Status: DISCONTINUED | OUTPATIENT
Start: 2019-07-03 | End: 2019-07-03 | Stop reason: SURG

## 2019-07-03 RX ORDER — OXYCODONE HYDROCHLORIDE AND ACETAMINOPHEN 5; 325 MG/1; MG/1
1 TABLET ORAL EVERY 4 HOURS PRN
Status: DISCONTINUED | OUTPATIENT
Start: 2019-07-03 | End: 2019-07-04 | Stop reason: HOSPADM

## 2019-07-03 RX ORDER — METHYLERGONOVINE MALEATE 0.2 MG/ML
INJECTION INTRAVENOUS AS NEEDED
Status: DISCONTINUED | OUTPATIENT
Start: 2019-07-03 | End: 2019-07-03 | Stop reason: SURG

## 2019-07-03 RX ORDER — METOCLOPRAMIDE HYDROCHLORIDE 5 MG/ML
10 INJECTION INTRAMUSCULAR; INTRAVENOUS ONCE AS NEEDED
Status: DISCONTINUED | OUTPATIENT
Start: 2019-07-03 | End: 2019-07-03

## 2019-07-03 RX ORDER — PENICILLIN G 3000000 [IU]/50ML
3 INJECTION, SOLUTION INTRAVENOUS ONCE
Status: COMPLETED | OUTPATIENT
Start: 2019-07-03 | End: 2019-07-03

## 2019-07-03 RX ORDER — PROMETHAZINE HYDROCHLORIDE 12.5 MG/1
12.5 SUPPOSITORY RECTAL EVERY 6 HOURS PRN
Status: DISCONTINUED | OUTPATIENT
Start: 2019-07-03 | End: 2019-07-04 | Stop reason: HOSPADM

## 2019-07-03 RX ORDER — METHYLERGONOVINE MALEATE 0.2 MG/ML
200 INJECTION INTRAVENOUS ONCE AS NEEDED
Status: DISCONTINUED | OUTPATIENT
Start: 2019-07-03 | End: 2019-07-04 | Stop reason: HOSPADM

## 2019-07-03 RX ORDER — ROPIVACAINE HYDROCHLORIDE 2 MG/ML
15 INJECTION, SOLUTION EPIDURAL; INFILTRATION; PERINEURAL CONTINUOUS
Status: DISCONTINUED | OUTPATIENT
Start: 2019-07-03 | End: 2019-07-03

## 2019-07-03 RX ORDER — LIDOCAINE HYDROCHLORIDE AND EPINEPHRINE 20; 5 MG/ML; UG/ML
INJECTION, SOLUTION EPIDURAL; INFILTRATION; INTRACAUDAL; PERINEURAL AS NEEDED
Status: DISCONTINUED | OUTPATIENT
Start: 2019-07-03 | End: 2019-07-03 | Stop reason: SURG

## 2019-07-03 RX ORDER — OXYCODONE HYDROCHLORIDE AND ACETAMINOPHEN 5; 325 MG/1; MG/1
2 TABLET ORAL EVERY 4 HOURS PRN
Status: DISCONTINUED | OUTPATIENT
Start: 2019-07-03 | End: 2019-07-04 | Stop reason: HOSPADM

## 2019-07-03 RX ORDER — ACETAMINOPHEN 500 MG
1000 TABLET ORAL ONCE
Status: COMPLETED | OUTPATIENT
Start: 2019-07-03 | End: 2019-07-03

## 2019-07-03 RX ORDER — ONDANSETRON 2 MG/ML
INJECTION INTRAMUSCULAR; INTRAVENOUS AS NEEDED
Status: DISCONTINUED | OUTPATIENT
Start: 2019-07-03 | End: 2019-07-03 | Stop reason: SURG

## 2019-07-03 RX ORDER — MIDAZOLAM HYDROCHLORIDE 1 MG/ML
INJECTION INTRAMUSCULAR; INTRAVENOUS AS NEEDED
Status: DISCONTINUED | OUTPATIENT
Start: 2019-07-03 | End: 2019-07-03 | Stop reason: SURG

## 2019-07-03 RX ORDER — ACETAMINOPHEN 325 MG/1
650 TABLET ORAL ONCE AS NEEDED
Status: DISCONTINUED | OUTPATIENT
Start: 2019-07-03 | End: 2019-07-03

## 2019-07-03 RX ORDER — OXYTOCIN-SODIUM CHLORIDE 0.9% IV SOLN 30 UNIT/500ML 30-0.9/5 UT/ML-%
650 SOLUTION INTRAVENOUS ONCE
Status: DISCONTINUED | OUTPATIENT
Start: 2019-07-03 | End: 2019-07-04 | Stop reason: HOSPADM

## 2019-07-03 RX ORDER — MORPHINE SULFATE 2 MG/ML
2 INJECTION, SOLUTION INTRAMUSCULAR; INTRAVENOUS
Status: DISCONTINUED | OUTPATIENT
Start: 2019-07-03 | End: 2019-07-04 | Stop reason: HOSPADM

## 2019-07-03 RX ORDER — ESMOLOL HYDROCHLORIDE 10 MG/ML
INJECTION INTRAVENOUS AS NEEDED
Status: DISCONTINUED | OUTPATIENT
Start: 2019-07-03 | End: 2019-07-03 | Stop reason: SURG

## 2019-07-03 RX ORDER — TRISODIUM CITRATE DIHYDRATE AND CITRIC ACID MONOHYDRATE 500; 334 MG/5ML; MG/5ML
30 SOLUTION ORAL ONCE
Status: COMPLETED | OUTPATIENT
Start: 2019-07-03 | End: 2019-07-03

## 2019-07-03 RX ORDER — FENTANYL CITRATE 50 UG/ML
INJECTION, SOLUTION INTRAMUSCULAR; INTRAVENOUS AS NEEDED
Status: DISCONTINUED | OUTPATIENT
Start: 2019-07-03 | End: 2019-07-03 | Stop reason: SURG

## 2019-07-03 RX ORDER — MORPHINE SULFATE 0.5 MG/ML
INJECTION, SOLUTION EPIDURAL; INTRATHECAL; INTRAVENOUS AS NEEDED
Status: DISCONTINUED | OUTPATIENT
Start: 2019-07-03 | End: 2019-07-03 | Stop reason: SURG

## 2019-07-03 RX ORDER — CARBOPROST TROMETHAMINE 250 UG/ML
250 INJECTION, SOLUTION INTRAMUSCULAR AS NEEDED
Status: DISCONTINUED | OUTPATIENT
Start: 2019-07-03 | End: 2019-07-04 | Stop reason: HOSPADM

## 2019-07-03 RX ORDER — LIDOCAINE HYDROCHLORIDE AND EPINEPHRINE 15; 5 MG/ML; UG/ML
INJECTION, SOLUTION EPIDURAL AS NEEDED
Status: DISCONTINUED | OUTPATIENT
Start: 2019-07-03 | End: 2019-07-03 | Stop reason: SURG

## 2019-07-03 RX ORDER — METOCLOPRAMIDE HYDROCHLORIDE 5 MG/ML
INJECTION INTRAMUSCULAR; INTRAVENOUS AS NEEDED
Status: DISCONTINUED | OUTPATIENT
Start: 2019-07-03 | End: 2019-07-03 | Stop reason: SURG

## 2019-07-03 RX ORDER — IBUPROFEN 600 MG/1
600 TABLET ORAL EVERY 6 HOURS PRN
Status: DISCONTINUED | OUTPATIENT
Start: 2019-07-03 | End: 2019-07-04 | Stop reason: HOSPADM

## 2019-07-03 RX ORDER — HYDROMORPHONE HYDROCHLORIDE 1 MG/ML
0.5 INJECTION, SOLUTION INTRAMUSCULAR; INTRAVENOUS; SUBCUTANEOUS
Status: DISCONTINUED | OUTPATIENT
Start: 2019-07-03 | End: 2019-07-03

## 2019-07-03 RX ORDER — OXYTOCIN 10 [USP'U]/ML
INJECTION, SOLUTION INTRAMUSCULAR; INTRAVENOUS AS NEEDED
Status: DISCONTINUED | OUTPATIENT
Start: 2019-07-03 | End: 2019-07-03 | Stop reason: SURG

## 2019-07-03 RX ORDER — EPHEDRINE SULFATE/0.9% NACL/PF 25 MG/5 ML
10 SYRINGE (ML) INTRAVENOUS
Status: DISCONTINUED | OUTPATIENT
Start: 2019-07-03 | End: 2019-07-03

## 2019-07-03 RX ORDER — IBUPROFEN 600 MG/1
600 TABLET ORAL ONCE AS NEEDED
Status: DISCONTINUED | OUTPATIENT
Start: 2019-07-03 | End: 2019-07-03

## 2019-07-03 RX ORDER — ONDANSETRON 2 MG/ML
4 INJECTION INTRAMUSCULAR; INTRAVENOUS ONCE
Status: DISCONTINUED | OUTPATIENT
Start: 2019-07-03 | End: 2019-07-03

## 2019-07-03 RX ORDER — PROMETHAZINE HYDROCHLORIDE 12.5 MG/1
12.5 TABLET ORAL EVERY 6 HOURS PRN
Status: DISCONTINUED | OUTPATIENT
Start: 2019-07-03 | End: 2019-07-04 | Stop reason: HOSPADM

## 2019-07-03 RX ADMIN — FAMOTIDINE 20 MG: 10 INJECTION, SOLUTION INTRAVENOUS at 21:48

## 2019-07-03 RX ADMIN — FENTANYL CITRATE 25 MCG: 50 INJECTION, SOLUTION INTRAMUSCULAR; INTRAVENOUS at 22:42

## 2019-07-03 RX ADMIN — OXYTOCIN 30 UNITS: 10 INJECTION, SOLUTION INTRAMUSCULAR; INTRAVENOUS at 22:19

## 2019-07-03 RX ADMIN — SODIUM CHLORIDE, POTASSIUM CHLORIDE, SODIUM LACTATE AND CALCIUM CHLORIDE 125 ML/HR: 600; 310; 30; 20 INJECTION, SOLUTION INTRAVENOUS at 16:08

## 2019-07-03 RX ADMIN — ROPIVACAINE HYDROCHLORIDE 14 ML/HR: 2 INJECTION, SOLUTION EPIDURAL; INFILTRATION at 07:44

## 2019-07-03 RX ADMIN — PENICILLIN G 3 MILLION UNITS: 3000000 INJECTION, SOLUTION INTRAVENOUS at 19:23

## 2019-07-03 RX ADMIN — PENICILLIN G 3 MILLION UNITS: 3000000 INJECTION, SOLUTION INTRAVENOUS at 07:50

## 2019-07-03 RX ADMIN — ACETAMINOPHEN 1000 MG: 500 TABLET, FILM COATED ORAL at 20:15

## 2019-07-03 RX ADMIN — SODIUM CITRATE AND CITRIC ACID MONOHYDRATE 30 ML: 500; 334 SOLUTION ORAL at 21:42

## 2019-07-03 RX ADMIN — PENICILLIN G 3 MILLION UNITS: 3000000 INJECTION, SOLUTION INTRAVENOUS at 11:15

## 2019-07-03 RX ADMIN — SODIUM CHLORIDE, POTASSIUM CHLORIDE, SODIUM LACTATE AND CALCIUM CHLORIDE 999 ML/HR: 600; 310; 30; 20 INJECTION, SOLUTION INTRAVENOUS at 21:31

## 2019-07-03 RX ADMIN — MIDAZOLAM HYDROCHLORIDE 1 MG: 1 INJECTION, SOLUTION INTRAMUSCULAR; INTRAVENOUS at 21:48

## 2019-07-03 RX ADMIN — FENTANYL CITRATE 100 MCG: 50 INJECTION, SOLUTION INTRAMUSCULAR; INTRAVENOUS at 21:49

## 2019-07-03 RX ADMIN — OXYTOCIN 30 UNITS: 10 INJECTION, SOLUTION INTRAMUSCULAR; INTRAVENOUS at 22:41

## 2019-07-03 RX ADMIN — ROPIVACAINE HYDROCHLORIDE 15 ML/HR: 2 INJECTION, SOLUTION EPIDURAL; INFILTRATION at 19:23

## 2019-07-03 RX ADMIN — PENICILLIN G 3 MILLION UNITS: 3000000 INJECTION, SOLUTION INTRAVENOUS at 16:08

## 2019-07-03 RX ADMIN — CHLOROPROCAINE HYDROCHLORIDE 5 ML: 30 INJECTION, SOLUTION EPIDURAL; INFILTRATION; INTRACAUDAL; PERINEURAL at 21:50

## 2019-07-03 RX ADMIN — ESMOLOL HYDROCHLORIDE 10 MG: 10 INJECTION, SOLUTION INTRAVENOUS at 22:16

## 2019-07-03 RX ADMIN — LIDOCAINE HYDROCHLORIDE AND EPINEPHRINE 3 ML: 15; 5 INJECTION, SOLUTION EPIDURAL at 07:37

## 2019-07-03 RX ADMIN — LIDOCAINE HYDROCHLORIDE,EPINEPHRINE BITARTRATE 5 ML: 20; .005 INJECTION, SOLUTION EPIDURAL; INFILTRATION; INTRACAUDAL; PERINEURAL at 16:15

## 2019-07-03 RX ADMIN — MORPHINE SULFATE 2 MG: 0.5 INJECTION, SOLUTION EPIDURAL; INTRATHECAL; INTRAVENOUS at 22:20

## 2019-07-03 RX ADMIN — SODIUM CHLORIDE, POTASSIUM CHLORIDE, SODIUM LACTATE AND CALCIUM CHLORIDE: 600; 310; 30; 20 INJECTION, SOLUTION INTRAVENOUS at 21:47

## 2019-07-03 RX ADMIN — FENTANYL CITRATE 25 MCG: 50 INJECTION, SOLUTION INTRAMUSCULAR; INTRAVENOUS at 22:24

## 2019-07-03 RX ADMIN — FENTANYL CITRATE 100 MCG: 50 INJECTION, SOLUTION INTRAMUSCULAR; INTRAVENOUS at 07:43

## 2019-07-03 RX ADMIN — FENTANYL CITRATE 50 MCG: 50 INJECTION, SOLUTION INTRAMUSCULAR; INTRAVENOUS at 22:31

## 2019-07-03 RX ADMIN — LIDOCAINE HYDROCHLORIDE,EPINEPHRINE BITARTRATE 10 ML: 20; .005 INJECTION, SOLUTION EPIDURAL; INFILTRATION; INTRACAUDAL; PERINEURAL at 21:40

## 2019-07-03 RX ADMIN — ROPIVACAINE HYDROCHLORIDE 10 ML: 5 INJECTION, SOLUTION EPIDURAL; INFILTRATION; PERINEURAL at 07:41

## 2019-07-03 RX ADMIN — SODIUM CHLORIDE, POTASSIUM CHLORIDE, SODIUM LACTATE AND CALCIUM CHLORIDE: 600; 310; 30; 20 INJECTION, SOLUTION INTRAVENOUS at 22:41

## 2019-07-03 RX ADMIN — PENICILLIN G 3 MILLION UNITS: 3000000 INJECTION, SOLUTION INTRAVENOUS at 21:38

## 2019-07-03 RX ADMIN — SODIUM CHLORIDE, POTASSIUM CHLORIDE, SODIUM LACTATE AND CALCIUM CHLORIDE 125 ML/HR: 600; 310; 30; 20 INJECTION, SOLUTION INTRAVENOUS at 07:34

## 2019-07-03 RX ADMIN — METOCLOPRAMIDE 10 MG: 5 INJECTION, SOLUTION INTRAMUSCULAR; INTRAVENOUS at 21:49

## 2019-07-03 RX ADMIN — MIDAZOLAM HYDROCHLORIDE 1 MG: 1 INJECTION, SOLUTION INTRAMUSCULAR; INTRAVENOUS at 22:11

## 2019-07-03 RX ADMIN — SODIUM CHLORIDE, POTASSIUM CHLORIDE, SODIUM LACTATE AND CALCIUM CHLORIDE: 600; 310; 30; 20 INJECTION, SOLUTION INTRAVENOUS at 22:19

## 2019-07-03 RX ADMIN — LIDOCAINE HYDROCHLORIDE,EPINEPHRINE BITARTRATE 5 ML: 20; .005 INJECTION, SOLUTION EPIDURAL; INFILTRATION; INTRACAUDAL; PERINEURAL at 19:21

## 2019-07-03 RX ADMIN — OXYTOCIN 20 UNITS: 10 INJECTION, SOLUTION INTRAMUSCULAR; INTRAVENOUS at 22:10

## 2019-07-03 RX ADMIN — PENICILLIN G 3 MILLION UNITS: 3000000 INJECTION, SOLUTION INTRAVENOUS at 04:16

## 2019-07-03 RX ADMIN — Medication 10 MG: at 08:01

## 2019-07-03 RX ADMIN — ONDANSETRON 4 MG: 2 INJECTION INTRAMUSCULAR; INTRAVENOUS at 21:49

## 2019-07-03 RX ADMIN — SODIUM CHLORIDE, POTASSIUM CHLORIDE, SODIUM LACTATE AND CALCIUM CHLORIDE 125 ML/HR: 600; 310; 30; 20 INJECTION, SOLUTION INTRAVENOUS at 00:20

## 2019-07-03 RX ADMIN — MORPHINE SULFATE 3 MG: 0.5 INJECTION, SOLUTION EPIDURAL; INTRATHECAL; INTRAVENOUS at 22:11

## 2019-07-03 RX ADMIN — METHYLERGONOVINE MALEATE 200 MCG: 0.2 INJECTION INTRAVENOUS at 22:27

## 2019-07-03 RX ADMIN — OXYCODONE HYDROCHLORIDE AND ACETAMINOPHEN 1 TABLET: 5; 325 TABLET ORAL at 23:08

## 2019-07-03 RX ADMIN — CHLOROPROCAINE HYDROCHLORIDE 5 ML: 30 INJECTION, SOLUTION EPIDURAL; INFILTRATION; INTRACAUDAL; PERINEURAL at 21:56

## 2019-07-03 RX ADMIN — LIDOCAINE HYDROCHLORIDE AND EPINEPHRINE 2 ML: 15; 5 INJECTION, SOLUTION EPIDURAL at 07:39

## 2019-07-03 NOTE — PROGRESS NOTES
VALERIE Sin Gaxiola  : 2000  MRN: [id number]  CSN: [id number]    Labor progress note    Subjective   She is having good pain control with the epidural. She just had a re-bolus     Objective   Min/max vitals past 24 hours:  Temp  Min: 97.9 °F (36.6 °C)  Max: 99.1 °F (37.3 °C)   BP  Min: 106/76  Max: 149/101   Pulse  Min: 61  Max: 139   Resp  Min: 14  Max: 18        FHT's: reassuring and category 1   Cervix: was checked (by me): 6-7 cm / 90 % / -1   Contractions: regular every 2-3 minutes        Assessment   1. IUP at 38w0d  2. Fetal status reassuring  3. Contractions adequate  4. GHTN     Plan   1. Continue with induction   2. Signing out to on call physician Dr. South.     Eli Rodriguez MD  7/3/2019  5:23 PM

## 2019-07-03 NOTE — NURSING NOTE
MD called RN for status update.  MD told of fetal strip, contraction pattern, and adequate MVUs of 225-260.  MD request increase in pitocin and vag exam.  RN clarified order due to MVUs.  MD confirmed request.

## 2019-07-03 NOTE — PAYOR COMM NOTE
"Barry BassettCuongluther (19 y.o. Female)     [facility] ID#[id number]    To: Estella    From: Deena Nagel  #[phone]  Fax#[phone]      Date of Birth Social Security Number Address Home Phone MRN    2000  [address] [id number] [phone] [id number]    Hoahaoism Marital Status          None Single       Admission Date Admission Type Admitting Provider Attending Provider Department, Room/Bed    7/2/19 Elective Eli Rodriguez MD Baylon, Mary Beth, MD [facility] LABOR DELIVERY, N308/1    Discharge Date Discharge Disposition Discharge Destination                       Attending Provider:  Eli Rodriguez MD    Allergies:  No Known Allergies    Isolation:  None   Infection:  None   Code Status:  CPR    Ht:  154.9 cm (61\")   Wt:  85.7 kg (189 lb)    Admission Cmt:  None   Principal Problem:  Gestational hypertension without significant proteinuria in third trimester [O13.3]                 Active Insurance as of 7/2/2019     Primary Coverage     Payor Plan Insurance Group Employer/Plan Group    [employer] [place] [employer] [place]      Payor Plan Address Payor Plan Phone Number Payor Plan Fax Number Effective Dates    [address] [address] [phone]  12/11/2018 - None Entered    [facility] [id number]       Subscriber Name Subscriber Birth Date Member ID       BARRY BASSETT 2000 [id number]                 Emergency Contacts      (Rel.) Home Phone Work Phone Mobile Phone    Yazmin Charles (Mother) [phone] -- --    bassem slade (Significant Other) -- -- [phone]            Insurance Information                [facility] MEDICAID Phone: [phone]    Subscriber: Barry Bassett Ben Subscriber#: [id number]    Group#:  Precert#:           Problem List           Codes Noted - Resolved       Hospital    * (Principal) Gestational hypertension without significant proteinuria in third trimester ICD-10-CM: " O13.3  ICD-9-CM: 642.33 2019 - Present       Non-Hospital    GBS (group B Streptococcus carrier), +RV culture, currently pregnant ICD-10-CM: O99.820  ICD-9-CM: V23.89, V02.51 2019 - Present    Fetal cardiac anomaly complicating pregnancy, antepartum ICD-10-CM: O35.8XX0  ICD-9-CM: 655.83 2019 - Present    Elevated glucola at 148 - normal 3 hour GTT ICD-10-CM: O99.810  ICD-9-CM: 648.83 5/15/2019 - Present    Urinary tract infection in mother during third trimester of pregnancy ICD-10-CM: O23.43  ICD-9-CM: 646.63, 599.0 2019 - Present    Biliary sludge ICD-10-CM: K83.8  ICD-9-CM: 576.8 2019 - Present    Right upper quadrant pain ICD-10-CM: R10.11  ICD-9-CM: 789.01 2019 - Present    Intractable vomiting with nausea ICD-10-CM: R11.2  ICD-9-CM: 536.2 2019 - Present    Abdominal pain during pregnancy in third trimester ICD-10-CM: O26.893, R10.9  ICD-9-CM: 646.83, 789.00 2019 - Present    Elevated blood pressure affecting pregnancy, antepartum ICD-10-CM: O16.9  ICD-9-CM: 642.33 2019 - Present    Pregnancy ICD-10-CM: Z34.90  ICD-9-CM: V22.2 2018 - Present             History & Physical      Eli Rodriguez MD at 2019  1:00 PM           Sin  More Gaxiola  : 2000  MRN: [id number]  CSN: [id number]    History and Physical    Subjective   More Gaxiola is a 19 y.o. year old  with an Estimated Date of Delivery: 19 currently at 37w6d presenting with complaints of swelling. Denies LOF, VB, HA, VC. She was seen in the office and had a mild range BP with trace urine protein.     Prenatal care has been with Dr. Rodriguez.  It has been complicated by GBS carrier and Small muscular VSD.    Obstetric History       T0      L0     SAB0   TAB0   Ectopic0   Molar0   Multiple0   Live Births0       # Outcome Date GA Lbr Aftab/2nd Weight Sex Delivery Anes PTL Lv   1 Current               Obstetric Comments   Denies history of STIs  "    Past Medical History:   Diagnosis Date   • Seasonal allergies      Past Surgical History:   Procedure Laterality Date   • FOREIGN BODY REMOVAL  2013    glass removed from foot   • WISDOM TOOTH EXTRACTION  2011       Current Facility-Administered Medications:   •  labetalol (NORMODYNE,TRANDATE) injection 20-80 mg, 20-80 mg, Intravenous, Q10 Min PRN, Eli Rodriguez MD  •  misoprostol (CYTOTEC) split tablet 25 mcg, 25 mcg, Vaginal, Q4H, Eli Rodriguez MD, 25 mcg at 07/02/19 1252  •  oxytocin in sodium chloride (PITOCIN) 30 UNIT/500ML infusion solution, 2-24 galina-units/min, Intravenous, Titrated, Eli Rodriguez MD  •  [COMPLETED] penicillin g 5 mu/100 mL 0.9% NS IVPB (mbp), 5 Million Units, Intravenous, Once, 5 Million Units at 07/02/19 1216 **FOLLOWED BY** penicillin G in iso-osmotic dextrose IVPB 3 million units (premix), 3 Million Units, Intravenous, Q4H, Eli Rodriguez MD    No Known Allergies  Social History    Tobacco Use      Smoking status: Former Smoker        Types: Cigarettes        Start date: 10/2018      Smokeless tobacco: Never Used    Review of Systems      Objective   /100   Pulse 93   Temp 98 °F (36.7 °C) (Oral)   Resp 16   Ht 154.9 cm (61\")   Wt 85.7 kg (189 lb)   LMP 10/10/2018   Breastfeeding? No   BMI 35.71 kg/m²    General: well developed; well nourished  no acute distress   Heart: Not performed.   Lungs: breathing is unlabored   Abdomen: soft, non-tender; no masses  no umbilical or inguinal hernias are present  no hepato-splenomegaly   FHT's: reassuring and category 1      Cervix: was checked (by me): 1 cm / 50 % / -3   Presentation: cephalic   Contractions: none   Extremities: extremities normal, atraumatic, no cyanosis or edema     Prenatal Labs  Lab Results   Component Value Date    HGB 11.3 (L) 07/02/2019    RUBELLAABIGG 251.7 12/12/2018    RUBELLAABIGG Immune 12/12/2018    HEPBSAG Non-Reactive 12/12/2018    ABSCRN Negative 07/02/2019    DQT9FUM4 " Non-Reactive 12/12/2018    HEPCVIRUSABY Non-Reactive 12/12/2018     05/15/2019    GGTFASTING 84 05/26/2019    JTN7GXLV 167 05/26/2019    SKU2OFXO 157 (H) 05/26/2019    YJT5CZBG 103 05/26/2019    STREPGPB pos 06/27/2019    URINECX >100,000 CFU/mL Mixed Di Isolated 06/13/2019       Last Labs  Lab Results   Component Value Date    HGB 11.3 (L) 07/02/2019     07/02/2019    AST 17 07/02/2019    ALT 11 07/02/2019     (H) 07/02/2019    URICACID 7.2 (H) 07/02/2019    CREATININE 0.66 07/02/2019        Imaging   No data reviewed        Assessment   1. IUP at 37w6d  2. Gestational hypertension   3. GBS+  4. Small muscular fetal VSD      Plan   1. Induction given GHTN at >37 weeks with mckeon and miso x2 then start pitocin  2. PCN for GBS ppx  3. AROM with descent of presenting part  4. Okay for epidural at patient request   5. Continue monitor for s/s of severe features of preeclampsia. Reviewed utility of magnesium with patient if this became a concern.     Eli Rodriguez MD  7/2/2019  1:02 PM        Electronically signed by Eli Rodriguez MD at 7/2/2019  1:04 PM       ICU Vital Signs     Row Name 07/03/19 1642 07/03/19 1639 07/03/19 1637 07/03/19 1633 07/03/19 1630       Vitals    Temp  --  98.7 °F (37.1 °C)  --  --  --    Temp src  --  Oral  --  --  --    Pulse  118  120  134  (Abnormal)   130  (Abnormal)   134  (Abnormal)     BP  111/61  122/60  132/60  122/77  121/79    Row Name 07/03/19 1627 07/03/19 1625 07/03/19 1621 07/03/19 1606 07/03/19 1551       Vitals    Pulse  123  (Abnormal)   120  116  115  110    BP  125/86  123/90  130/80  126/78  121/77    Row Name 07/03/19 1537 07/03/19 1530 07/03/19 1523 07/03/19 1506 07/03/19 1452       Vitals    Temp  --  98.5 °F (36.9 °C)  --  --  --    Temp src  --  Oral  --  --  --    Pulse  108  --  114  116  112    BP  120/79  --  135/96  133/89  131/90    Row Name 07/03/19 1437 07/03/19 1421 07/03/19 1406 07/03/19 1355 07/03/19 1351       Vitals     Temp  --  --  --  99.1 °F (37.3 °C)  --    Temp src  --  --  --  Oral  --    Pulse  108  108  115  116  117    Resp  --  --  --  16  --    Resp Rate Source  --  --  --  Visual  --    BP  137/96  138/93  136/90  138/97  122/80    BP Location  --  --  --  Right arm  --    BP Method  --  --  --  Automatic  --    Patient Position  --  --  --  Sitting  --    Row Name 07/03/19 1337 07/03/19 1322 07/03/19 1306 07/03/19 1252 07/03/19 1237       Vitals    Pulse  115  107  115  105  107    BP  119/78  120/75  123/79  121/80  121/77    Row Name 07/03/19 1222 07/03/19 1206 07/03/19 1202 07/03/19 1152 07/03/19 1137       Vitals    Temp  --  98.4 °F (36.9 °C)  --  --  --    Temp src  --  Oral  --  --  --    Pulse  107  115  121  (Abnormal)   120  111    Heart Rate Source  --  Monitor  --  --  --    Resp  --  14  --  --  --    Resp Rate Source  --  Visual  --  --  --    BP  117/74  126/87  122/79  126/82  127/91       Patient Observation    Observations  --  pt and family concern over cervical change. educated family on cervical change and contractions. pt and family verbalized understanding and satisfaction with labor progress.   --  --  --    Row Name 07/03/19 1121 07/03/19 1107 07/03/19 1052 07/03/19 1036 07/03/19 1021       Vitals    Pulse  109  116  117  120  118    BP  132/88  115/77  118/78  120/79  116/79    Row Name 07/03/19 1007 07/03/19 0950 07/03/19 0947 07/03/19 0946 07/03/19 0944       Vitals    Temp  --  --  --  98.5 °F (36.9 °C)  --    Temp src  --  --  --  Oral  --    Pulse  114  118  127  (Abnormal)   125  (Abnormal)   122  (Abnormal)     Heart Rate Source  --  --  --  Monitor  --    Resp  --  --  --  16  --    Resp Rate Source  --  --  --  Visual  --    BP  118/80  118/73  128/86  129/81  122/85    Row Name 07/03/19 0941 07/03/19 0938 07/03/19 0935 07/03/19 0932 07/03/19 0929       Vitals    Pulse  120  120  125  (Abnormal)   127  (Abnormal)   129  (Abnormal)     BP  122/87  124/87  125/85  123/82  121/81    Row  Name 07/03/19 0926 07/03/19 0923 07/03/19 0920 07/03/19 0917 07/03/19 0914       Vitals    Pulse  131  (Abnormal)   139  (Abnormal)   123  (Abnormal)   126  (Abnormal)   122  (Abnormal)     BP  121/80  121/80  123/80  131/82  118/76    Row Name 07/03/19 0911 07/03/19 0908 07/03/19 0905 07/03/19 0902 07/03/19 0859       Vitals    Pulse  121  (Abnormal)   123  (Abnormal)   126  (Abnormal)   126  (Abnormal)   129  (Abnormal)     BP  124/79  120/74  118/75  117/80  116/76    Row Name 07/03/19 0856 07/03/19 0853 07/03/19 0850 07/03/19 0847 07/03/19 0841       Vitals    Pulse  126  (Abnormal)   121  (Abnormal)   125  (Abnormal)   120  120    BP  113/75  115/80  115/77  116/77  120/84    Row Name 07/03/19 0838 07/03/19 0835 07/03/19 0832 07/03/19 0829 07/03/19 0823       Vitals    Pulse  123  (Abnormal)   123  (Abnormal)   123  (Abnormal)   125  (Abnormal)   117    BP  124/82  122/83  123/82  118/81  123/86    Row Name 07/03/19 0820 07/03/19 0817 07/03/19 0814 07/03/19 0811 07/03/19 0808       Vitals    Pulse  120  122  (Abnormal)   130  (Abnormal)   121  (Abnormal)   116    BP  126/86  130/93  127/89  125/86  120/84    Row Name 07/03/19 0805 07/03/19 0803 07/03/19 0800 07/03/19 0756 07/03/19 0753       Vitals    Pulse  116  98  111  107  108    BP  124/84  115/83  107/58  108/64  115/70    Row Name 07/03/19 0750 07/03/19 0747 07/03/19 0744 07/03/19 0741 07/03/19 0738       Vitals    Pulse  105  104  96  99  114    BP  114/71  117/72  119/71  117/70  120/84    Row Name 07/03/19 0722 07/03/19 0646 07/03/19 0616 07/03/19 0546 07/03/19 0517       Vitals    Temp  98.1 °F (36.7 °C)  --  --  --  --    Temp src  Oral  --  --  --  --    Pulse  99  102  78  99  98    Heart Rate Source  Monitor  --  --  --  --    Resp  14  --  --  --  --    Resp Rate Source  Visual  --  --  --  --    BP  126/81  132/85  113/74  128/86  127/86    BP Location  Right arm  --  --  --  --    BP Method  Automatic  --  --  --  --    Patient Position  Lying   --  --  --  --    Row Name 07/03/19 0446 07/03/19 0418 07/03/19 0116 07/03/19 0046 07/03/19 0016       Vitals    Temp  --  98 °F (36.7 °C)  --  --  98.1 °F (36.7 °C)    Temp src  --  Oral  --  --  Oral    Pulse  85  --  93  71  61    Heart Rate Source  --  Monitor  --  --  --    Resp  --  16  --  --  --    Resp Rate Source  --  Visual  --  --  --    BP  138/100  --  106/76  107/57  111/67    Row Name 07/02/19 2347 07/02/19 2320 07/02/19 2316 07/02/19 2302 07/02/19 2241       Vitals    Pulse  100  --  71  --  85    BP  126/84  --  136/83  --  132/88       Patient Observation    Observations  --  Patient called out thinking her water broke, this RN to bedside, mucous noted, but no fluid, nitrazine negative. Sheets changed, pt denies discomfort at this time  --  Charge nurse to bedside. US adjusted, abdomen palpates soft. pitocin restarted per verbal order from Dr Santiago.  --    Row Name 07/02/19 2231 07/02/19 2211 07/02/19 2201 07/02/19 2152 07/02/19 2141       Vitals    Pulse  80  70  74  65  71    BP  141/100  147/97  128/88  137/98  126/91    Row Name 07/02/19 2132 07/02/19 2121 07/02/19 2102 07/02/19 2051 07/02/19 2005       Vitals    Temp  --  --  --  --  97.9 °F (36.6 °C)    Temp src  --  --  --  --  Axillary    Pulse  77  82  65  65  63    Resp  --  --  --  --  18    Resp Rate Source  --  --  --  --  Visual    BP  126/89  143/96  149/101  (Abnormal)   146/99  132/86    Row Name 07/02/19 1711 07/02/19 1706 07/02/19 1705             Vitals    Temp  --  --  98.7 °F (37.1 °C)      Temp src  --  --  Oral      Pulse  --  93  --      Resp  --  --  16      BP  --  121/72  --         Patient Observation    Observations  call victor manuel and informed of contraction pattern-orders given to hold cytotec  --  --          Hospital Medications (active)       Dose Frequency Start End    diphenhydrAMINE (BENADRYL) injection 12.5 mg 12.5 mg Every 8 Hours PRN 7/3/2019     Sig - Route: Infuse 0.25 mL into a venous catheter Every 8  (Eight) Hours As Needed for Itching. - Intravenous    ePHEDrine Sulfate 5 mg (25 MG/5ML syringe) 10 mg Every 10 Minutes PRN 7/3/2019     Sig - Route: Infuse 2 mL into a venous catheter Every 10 (Ten) Minutes As Needed (low blood pressure). - Intravenous    fentaNYL citrate (PF) (SUBLIMAZE) injection  As Needed 7/3/2019     Sig - Route: by Epidural route As Needed. - Epidural    labetalol (NORMODYNE,TRANDATE) injection 20-80 mg 20-80 mg Every 10 Minutes PRN 7/2/2019 7/3/2019    Sig - Route: Infuse 4-16 mL into a venous catheter Every 10 (Ten) Minutes As Needed for High Blood Pressure (See Admin Instructions). - Intravenous    lactated ringers bolus 1,500 mL 1,500 mL As Needed 7/3/2019     Sig - Route: Infuse 1,500 mL into a venous catheter As Needed (preload for epidural). - Intravenous    lactated ringers infusion 125 mL/hr Continuous 7/2/2019     Sig - Route: Infuse 125 mL/hr into a venous catheter Continuous. - Intravenous    lidocaine-EPINEPHrine (XYLOCAINE W/EPI) 1.5 %-1:784742 injection  As Needed 7/3/2019     Sig - Route: by Epidural route As Needed. - Epidural    lidocaine-EPINEPHrine (XYLOCAINE W/EPI) 2 %-1:153601 injection  As Needed 7/3/2019     Sig - Route: by Epidural route As Needed. - Epidural    metoclopramide (REGLAN) injection 10 mg 10 mg Once As Needed 7/3/2019     Sig - Route: Infuse 2 mL into a venous catheter 1 (One) Time As Needed for Nausea. - Intravenous    misoprostol (CYTOTEC) split tablet 25 mcg 25 mcg Every 4 Hours Scheduled 7/2/2019 7/2/2019    Sig - Route: Insert 1 quarter tablet into the vagina Every 4 (Four) Hours. - Vaginal    ondansetron (ZOFRAN) injection 4 mg 4 mg Once As Needed 7/3/2019     Sig - Route: Infuse 2 mL into a venous catheter 1 (One) Time As Needed for Nausea or Vomiting. - Intravenous    oxytocin in sodium chloride (PITOCIN) 30 UNIT/500ML infusion solution 2-24 galina-units/min Titrated 7/2/2019     Sig - Route: Infuse 0.002-0.024 Units/min into a venous catheter Dose  "Adjusted By Provider As Needed. - Intravenous    penicillin G in iso-osmotic dextrose IVPB 3 million units (premix) 3 Million Units Every 4 Hours 7/2/2019 7/4/2019    Sig - Route: Infuse 50 mL into a venous catheter Every 4 (Four) Hours. - Intravenous    Linked Group 1:  \"Followed by\" Linked Group Details        ropivacaine (NAROPIN) 0.2 % injection 15 mL/hr Continuous 7/3/2019     Sig - Route: 30 mg/hr by Epidural route Continuous. - Epidural    ropivacaine (NAROPIN) 0.5 % 5 mL in sodium chloride 0.9 % 5 mL epidural  Continuous PRN 7/3/2019     Sig - Route: by Epidural route Continuous As Needed. - Epidural    Sod Citrate-Citric Acid (BICITRA) solution 30 mL 30 mL Once 7/3/2019     Sig - Route: Take 30 mL by mouth 1 (One) Time. - Oral            Lab Results (last 24 hours)     ** No results found for the last 24 hours. **        Imaging Results (last 24 hours)     ** No results found for the last 24 hours. **        Orders (last 24 hrs)     Start     Ordered    07/03/19 0815  Sod Citrate-Citric Acid (BICITRA) solution 30 mL  Once      07/03/19 0718    07/03/19 0815  ropivacaine (NAROPIN) 0.2 % injection  Continuous      07/03/19 0718    07/03/19 0719  Vital Signs Per Anesthesia Guidelines  Continuous     Comments:  Every 3 Minutes x 20 Minutes following epidural dosing, then if stable every 15 Minutes    07/03/19 0718    07/03/19 0719  Start IV (16 or 18 Gauge)  Once      07/03/19 0718    07/03/19 0719  Fetal Heart Rate Monitor  Once      07/03/19 0718    07/03/19 0719  Nurse or Anesthesiologist to Remain With Patient for 15 Minutes Following Dosing  Continuous      07/03/19 0718    07/03/19 0719  Facilitate Maternal Position on Side & Maintain Uterine Displacement  Continuous      07/03/19 0718    07/03/19 0719  Consult Anesthesia Prior to Changing Epidural Infusion / Rate  Continuous      07/03/19 0718    07/03/19 0718  lactated ringers bolus 1,500 mL  As Needed      07/03/19 0718    07/03/19 0718  ePHEDrine " Sulfate 5 mg (25 MG/5ML syringe)  Every 10 Minutes PRN      19 0718    19 0718  diphenhydrAMINE (BENADRYL) injection 12.5 mg  Every 8 Hours PRN      19 0718    19 0718  metoclopramide (REGLAN) injection 10 mg  Once As Needed      19 0718    19 0718  ondansetron (ZOFRAN) injection 4 mg  Once As Needed      19 0718    19 2000  oxytocin in sodium chloride (PITOCIN) 30 UNIT/500ML infusion solution  Titrated      19 1148    19 1800  lactated ringers infusion  Continuous      19 1704    19 1600  penicillin G in iso-osmotic dextrose IVPB 3 million units (premix)  Every 4 Hours      19 1149    19 1245  misoprostol (CYTOTEC) split tablet 25 mcg  Every 4 Hours Scheduled      19 1148    19 1147  labetalol (NORMODYNE,TRANDATE) injection 20-80 mg  Every 10 Minutes PRN      19 1148    Unscheduled  Position Change - For Intra-Uterine Resusitation for Hypertonus, HyperStimulation or Non-Reassuring Fetal Status  As Needed      19 1027    Signed and Held  Nurse May Remove Epidural Catheter After Delivery  Continuous      Signed and Held    Signed and Held  Transfer to postpartum when discharge criteria met.  Until Discontinued      Signed and Held          Operative/Procedure Notes (last 24 hours) (Notes from 2019  5:04 PM through 7/3/2019  5:04 PM)     No notes of this type exist for this encounter.           Physician Progress Notes (last 24 hours) (Notes from 2019  5:04 PM through 7/3/2019  5:04 PM)      Eli Rodriguez MD at 7/3/2019 12:57 PM           Sin Gaxiola  : 2000  MRN: [id number]  CSN: [id number]    Labor progress note    Subjective   She is having good pain control with the epidural.    Objective   Min/max vitals past 24 hours:  Temp  Min: 97.9 °F (36.6 °C)  Max: 98.7 °F (37.1 °C)   BP  Min: 106/76  Max: 149/101   Pulse  Min: 61  Max: 139   Resp  Min: 14  Max: 18         FHT's: reassuring and category 2   Cervix: was checked (by me): 5 cm / 80 % / -1   Contractions: regular every 3 minutes       Assessment   5. IUP at 38w0d  6. Fetal status reassuring  7. Contractions adequate  8. GHTN - BPs appropriate     Plan   6. Continue with induction  7. Recheck cervix in 2 hours    Eil Rodriguez MD  7/3/2019  12:57 PM           Electronically signed by Eli Rodriguez MD at 7/3/2019 12:58 PM     Eli Rodriguez MD at 7/3/2019  8:11 AM           Sin Gaxiola  : 2000  MRN: [id number]  CSN: [id number]    Labor progress note    Subjective   She is having good pain control with the epidural.    Objective   Min/max vitals past 24 hours:  Temp  Min: 97.9 °F (36.6 °C)  Max: 98.7 °F (37.1 °C)   BP  Min: 106/76  Max: 149/101   Pulse  Min: 61  Max: 116   Resp  Min: 14  Max: 18        FHT's: reassuring and category 1   Cervix: was checked (by RN): 3 cm / 60 % / -3   Contractions: regular every 3 minutes ot 4 minutes       Assessment   9. IUP at 38w0d  10. Fetal status reassuring   11. GHTN - blood pressures reassuring     Plan   8. Continue with induction, increased pitocin to get adequate MVUs  9. S/p AROM this morning and placement of IUPC by laborist   10. Continue PCN for GBS ppx    Eli Rodriguez MD  7/3/2019  8:11 AM           Electronically signed by Eli Rodriguez MD at 7/3/2019  8:13 AM       Consult Notes (last 24 hours) (Notes from 2019  5:04 PM through 7/3/2019  5:04 PM)     No notes of this type exist for this encounter.          FHR (last day)     Date/Time Fetal HR Assessment Method Fetal HR (beats/min) Fetal Heart Baseline Rate Fetal HR Variability Fetal HR Accelerations Fetal HR Decelerations Fetal HR Tracing Category    19 1630  external  135  normal range  moderate (amplitude range 6 to 25 bpm)  greater than/equal to 15 bpm;lasting at least 15 seconds  late  --    19 1615  external  135  normal range  moderate  (amplitude range 6 to 25 bpm)  greater than/equal to 15 bpm;lasting at least 15 seconds  early  --    07/03/19 1600  external  135  normal range  moderate (amplitude range 6 to 25 bpm)  greater than/equal to 15 bpm;lasting at least 15 seconds  absent  --    07/03/19 1545  external  130  normal range  moderate (amplitude range 6 to 25 bpm)  greater than/equal to 15 bpm;lasting at least 15 seconds  early  --    07/03/19 1530  external  125  normal range  moderate (amplitude range 6 to 25 bpm)  greater than/equal to 15 bpm;lasting at least 15 seconds  early  --    07/03/19 1515  external  135  normal range  moderate (amplitude range 6 to 25 bpm)  prolonged;greater than/equal to 15 bpm;lasting at least 15 seconds  absent  --    07/03/19 1500  external  135  normal range  moderate (amplitude range 6 to 25 bpm)  greater than/equal to 15 bpm;lasting at least 15 seconds  absent  --    07/03/19 1445  external  135  normal range  moderate (amplitude range 6 to 25 bpm)  greater than/equal to 15 bpm;lasting at least 15 seconds  absent  --    07/03/19 1430  external  135  normal range  moderate (amplitude range 6 to 25 bpm)  greater than/equal to 15 bpm;lasting at least 15 seconds  absent  --    07/03/19 1415  external  135  normal range  moderate (amplitude range 6 to 25 bpm)  greater than/equal to 15 bpm;lasting at least 15 seconds  early  --    07/03/19 1400  external  135  normal range  moderate (amplitude range 6 to 25 bpm)  greater than/equal to 15 bpm;lasting at least 15 seconds  early  --    07/03/19 1345  external  130  normal range  moderate (amplitude range 6 to 25 bpm)  greater than/equal to 15 bpm;lasting at least 15 seconds  --  --    07/03/19 1330  external  130  normal range  moderate (amplitude range 6 to 25 bpm)  greater than/equal to 15 bpm;lasting at least 15 seconds  early  --    07/03/19 1315  external  125  normal range  moderate (amplitude range 6 to 25 bpm)  greater than/equal to 15 bpm;lasting at least  15 seconds  early  --    07/03/19 1300  external  130  normal range  moderate (amplitude range 6 to 25 bpm)  prolonged;greater than/equal to 15 bpm;lasting at least 15 seconds  early;variable  --    07/03/19 1245  external  135  normal range  moderate (amplitude range 6 to 25 bpm)  greater than/equal to 15 bpm;lasting at least 15 seconds  early  --    07/03/19 1230  external  130  normal range  moderate (amplitude range 6 to 25 bpm)  greater than/equal to 15 bpm;lasting at least 15 seconds  variable  --    07/03/19 1215  external  130  normal range  moderate (amplitude range 6 to 25 bpm)  greater than/equal to 15 bpm;lasting at least 15 seconds  variable;late  --    07/03/19 1200  external  135  normal range  moderate (amplitude range 6 to 25 bpm)  greater than/equal to 15 bpm;lasting at least 15 seconds  variable  --    07/03/19 1145  external  130  normal range  moderate (amplitude range 6 to 25 bpm)  greater than/equal to 15 bpm;lasting at least 15 seconds  early  --    07/03/19 1130  external  130  normal range  moderate (amplitude range 6 to 25 bpm)  greater than/equal to 15 bpm;lasting at least 15 seconds  early  --    07/03/19 1115  external  130  normal range  moderate (amplitude range 6 to 25 bpm)  greater than/equal to 15 bpm;lasting at least 15 seconds  early  --    07/03/19 1100  external  135  normal range  moderate (amplitude range 6 to 25 bpm)  greater than/equal to 15 bpm;lasting at least 15 seconds  early  --    07/03/19 1045  external  130  normal range  moderate (amplitude range 6 to 25 bpm)  greater than/equal to 15 bpm;lasting at least 15 seconds  absent  --    07/03/19 1030  external  130  normal range  moderate (amplitude range 6 to 25 bpm)  greater than/equal to 15 bpm;lasting at least 15 seconds  absent  --    07/03/19 1015  external  125  normal range  moderate (amplitude range 6 to 25 bpm)  greater than/equal to 15 bpm;lasting at least 15 seconds  absent  --    07/03/19 1000  external  125   normal range  moderate (amplitude range 6 to 25 bpm)  greater than/equal to 15 bpm;lasting at least 15 seconds  absent  --    07/03/19 0945  external  130  normal range  moderate (amplitude range 6 to 25 bpm)  greater than/equal to 15 bpm;lasting at least 15 seconds  early  --    07/03/19 0930  external  130  normal range  moderate (amplitude range 6 to 25 bpm)  greater than/equal to 15 bpm;lasting at least 15 seconds  absent  --    07/03/19 0915  external  130  normal range  moderate (amplitude range 6 to 25 bpm)  greater than/equal to 15 bpm;lasting at least 15 seconds  early  --    07/03/19 0900  external  125  normal range  moderate (amplitude range 6 to 25 bpm)  greater than/equal to 15 bpm;lasting at least 15 seconds  absent  --    07/03/19 0845  external  125  normal range  moderate (amplitude range 6 to 25 bpm)  greater than/equal to 15 bpm;lasting at least 15 seconds  early  --    07/03/19 0830  external  130  normal range  moderate (amplitude range 6 to 25 bpm)  greater than/equal to 15 bpm;lasting at least 15 seconds  early  --    07/03/19 0815  external  135  normal range  minimal (detectable, amplitude less than or equal to 5 bpm)  absent  early;variable  --    07/03/19 0800  external  135  normal range  moderate (amplitude range 6 to 25 bpm)  greater than/equal to 15 bpm;lasting at least 15 seconds  early  --    07/03/19 0745  external  135  normal range  minimal (detectable, amplitude less than or equal to 5 bpm)  absent  early  --    07/03/19 0730  external  130  normal range  moderate (amplitude range 6 to 25 bpm)  greater than/equal to 15 bpm;lasting at least 15 seconds  absent  --    07/03/19 0715  external  125  normal range  moderate (amplitude range 6 to 25 bpm)  greater than/equal to 15 bpm;lasting at least 15 seconds  absent  --    07/03/19 0700  external  130  normal range  moderate (amplitude range 6 to 25 bpm)  greater than/equal to 15 bpm;lasting at least 15 seconds  recurrent;early  --     07/03/19 0645  external  130  normal range  moderate (amplitude range 6 to 25 bpm)  greater than/equal to 15 bpm;lasting at least 15 seconds  recurrent;early  --    07/03/19 0630  external  135  normal range  moderate (amplitude range 6 to 25 bpm)  greater than/equal to 15 bpm;lasting at least 15 seconds  absent  --    07/03/19 0615  external  135  normal range  moderate (amplitude range 6 to 25 bpm)  greater than/equal to 15 bpm;lasting at least 15 seconds  absent  --    07/03/19 0600  external  135  normal range  moderate (amplitude range 6 to 25 bpm)  greater than/equal to 15 bpm;lasting at least 15 seconds  absent  --    07/03/19 0545  external  125  normal range  moderate (amplitude range 6 to 25 bpm)  greater than/equal to 15 bpm;lasting at least 15 seconds  absent  --    07/03/19 0530  external  125  normal range  moderate (amplitude range 6 to 25 bpm)  greater than/equal to 15 bpm;lasting at least 15 seconds  absent  --    07/03/19 0515  external  125  normal range  moderate (amplitude range 6 to 25 bpm)  greater than/equal to 15 bpm;lasting at least 15 seconds  absent  --    07/03/19 0500  external  125  normal range  moderate (amplitude range 6 to 25 bpm)  greater than/equal to 15 bpm;lasting at least 15 seconds  absent  --    07/03/19 0445  external  --  indeterminate  --  --  --  --    07/03/19 0430  external  125  normal range  moderate (amplitude range 6 to 25 bpm)  greater than/equal to 15 bpm;lasting at least 15 seconds  absent  --    07/03/19 0415  external  --  indeterminate  --  --  --  --    07/03/19 0400  external  115  normal range  moderate (amplitude range 6 to 25 bpm)  greater than/equal to 15 bpm;lasting at least 15 seconds  absent  --    07/03/19 0345  external  110  normal range  moderate (amplitude range 6 to 25 bpm)  greater than/equal to 15 bpm;lasting at least 15 seconds  absent  --    07/03/19 0330  external  120  normal range  moderate (amplitude range 6 to 25 bpm)  greater  than/equal to 15 bpm;lasting at least 15 seconds  absent  --    07/03/19 0315  external  125  normal range  moderate (amplitude range 6 to 25 bpm)  greater than/equal to 15 bpm;lasting at least 15 seconds  absent  --    07/03/19 0300  external  125  normal range  moderate (amplitude range 6 to 25 bpm)  greater than/equal to 15 bpm;lasting at least 15 seconds  absent  --    07/03/19 0245  external  125  normal range  moderate (amplitude range 6 to 25 bpm)  greater than/equal to 15 bpm;lasting at least 15 seconds  absent  --    07/03/19 0230  external  115  normal range  moderate (amplitude range 6 to 25 bpm)  greater than/equal to 15 bpm;lasting at least 15 seconds  absent  --    07/03/19 0215  external  115  normal range  moderate (amplitude range 6 to 25 bpm)  greater than/equal to 15 bpm;lasting at least 15 seconds  absent  --    07/03/19 0200  external  120  normal range  moderate (amplitude range 6 to 25 bpm)  greater than/equal to 15 bpm;lasting at least 15 seconds  absent  --    07/03/19 0145  external  120  normal range  moderate (amplitude range 6 to 25 bpm)  greater than/equal to 15 bpm;lasting at least 15 seconds  absent  --    07/03/19 0130  external  120  normal range  moderate (amplitude range 6 to 25 bpm)  greater than/equal to 15 bpm;lasting at least 15 seconds  absent  --    07/03/19 0030  external  120  indeterminate  moderate (amplitude range 6 to 25 bpm)  greater than/equal to 15 bpm;lasting at least 15 seconds  absent  --    07/03/19 0000  external  120  normal range  moderate (amplitude range 6 to 25 bpm)  greater than/equal to 15 bpm;lasting at least 15 seconds  absent  --    07/02/19 2345  external  120  normal range  moderate (amplitude range 6 to 25 bpm)  greater than/equal to 15 bpm;lasting at least 15 seconds  absent  --    07/02/19 2330  external  125  normal range  moderate (amplitude range 6 to 25 bpm)  greater than/equal to 15 bpm;lasting at least 15 seconds  absent  --    07/02/19  2315  external  120  normal range  moderate (amplitude range 6 to 25 bpm)  greater than/equal to 15 bpm;lasting at least 15 seconds  absent  --    07/02/19 2300  external  120  normal range  moderate (amplitude range 6 to 25 bpm)  greater than/equal to 15 bpm;lasting at least 15 seconds  absent  --    07/02/19 2245  external  120  normal range  moderate (amplitude range 6 to 25 bpm)  greater than/equal to 15 bpm;lasting at least 15 seconds  absent  --    07/02/19 2230  external  125  normal range  moderate (amplitude range 6 to 25 bpm)  greater than/equal to 15 bpm;lasting at least 15 seconds  absent  --    07/02/19 2215  external  125  normal range  moderate (amplitude range 6 to 25 bpm)  greater than/equal to 15 bpm;lasting at least 15 seconds  absent  --    07/02/19 2200  external  130  normal range  moderate (amplitude range 6 to 25 bpm)  greater than/equal to 15 bpm;lasting at least 15 seconds  absent  --    07/02/19 2145  external  115  normal range  moderate (amplitude range 6 to 25 bpm)  absent  absent  --    07/02/19 2130  external  120  normal range  moderate (amplitude range 6 to 25 bpm)  greater than/equal to 15 bpm;lasting at least 15 seconds  absent  --    07/02/19 2115  external  120  normal range  moderate (amplitude range 6 to 25 bpm)  greater than/equal to 15 bpm;lasting at least 15 seconds  absent  --    07/02/19 2100  external  120  normal range  moderate (amplitude range 6 to 25 bpm)  greater than/equal to 15 bpm;lasting at least 15 seconds  absent  --    07/02/19 2045  external  120  normal range  moderate (amplitude range 6 to 25 bpm)  greater than/equal to 15 bpm;lasting at least 15 seconds  absent  --    07/02/19 2015  external  120  normal range  moderate (amplitude range 6 to 25 bpm)  greater than/equal to 15 bpm;lasting at least 15 seconds  absent  --    07/02/19 1945  external  120  normal range  moderate (amplitude range 6 to 25 bpm)  greater than/equal to 15 bpm;lasting at least 15  seconds  absent  --    07/02/19 1915  external  120  normal range  moderate (amplitude range 6 to 25 bpm)  greater than/equal to 15 bpm;lasting at least 15 seconds  absent  --    07/02/19 1900  external  120  normal range  moderate (amplitude range 6 to 25 bpm)  greater than/equal to 15 bpm;lasting at least 15 seconds  absent  --    07/02/19 1845  external  125  normal range  moderate (amplitude range 6 to 25 bpm)  lasting at least 15 seconds;greater than/equal to 15 bpm  absent  --    07/02/19 1830  external  125  normal range  moderate (amplitude range 6 to 25 bpm)  greater than/equal to 15 bpm;lasting at least 15 seconds  absent  --    07/02/19 1815  external  125  normal range  moderate (amplitude range 6 to 25 bpm)  lasting at least 15 seconds;greater than/equal to 15 bpm  absent  --    07/02/19 1800  external  130  normal range  moderate (amplitude range 6 to 25 bpm)  lasting at least 15 seconds;greater than/equal to 15 bpm  absent  --    07/02/19 1745  external  140  normal range  moderate (amplitude range 6 to 25 bpm)  greater than/equal to 15 bpm;lasting at least 15 seconds  absent  --    07/02/19 1730  external  135  normal range  moderate (amplitude range 6 to 25 bpm)  lasting at least 15 seconds;greater than/equal to 15 bpm  absent  --    07/02/19 1715  --  130  --  moderate (amplitude range 6 to 25 bpm)  greater than/equal to 15 bpm;lasting at least 15 seconds  absent  --        Uterine Activity (last day)     Date/Time Method Contraction Frequency (Minutes) Contraction Duration (sec) Contraction Intensity Uterine Resting Tone Contraction Pattern    07/03/19 1630  IUPC (intrauterine pressure catheter)  2-3    --  --  --    07/03/19 1615  IUPC (intrauterine pressure catheter)  1-4    --  --  --    07/03/19 1600  IUPC (intrauterine pressure catheter)  1-3    --  --  --    07/03/19 1545  IUPC (intrauterine pressure catheter)  2-3    --  --  --    07/03/19 1530  IUPC (intrauterine  pressure catheter)  2-3    --  --  --    07/03/19 1515  IUPC (intrauterine pressure catheter)  2-3    --  --  --    07/03/19 1500  IUPC (intrauterine pressure catheter)  2-3    --  --  --    07/03/19 1447  --  --  --  --  soft by palpation  --    07/03/19 1445  IUPC (intrauterine pressure catheter)  2-3  60-80  --  --  --    07/03/19 1430  IUPC (intrauterine pressure catheter)  2-3  60-80  --  --  --    07/03/19 1415  IUPC (intrauterine pressure catheter)  2-3  60-80  --  --  --    07/03/19 1400  IUPC (intrauterine pressure catheter)  1-3    --  --  --    07/03/19 1345  IUPC (intrauterine pressure catheter)  2-3    --  --  --    07/03/19 1330  IUPC (intrauterine pressure catheter)  2-3    --  --  --    07/03/19 1315  IUPC (intrauterine pressure catheter)  2-4    --  --  --    07/03/19 1300  IUPC (intrauterine pressure catheter)  2-4  60-80  --  --  --    07/03/19 1245  IUPC (intrauterine pressure catheter)  2-3    --  --  --    07/03/19 1230  IUPC (intrauterine pressure catheter)  2-3    --  --  --    07/03/19 1215  IUPC (intrauterine pressure catheter)  2-3    --  --  --    07/03/19 1200  IUPC (intrauterine pressure catheter)  2-4    --  --  --    07/03/19 1145  IUPC (intrauterine pressure catheter)  3    --  --  --    07/03/19 1130  IUPC (intrauterine pressure catheter)  2-3    --  --  --    07/03/19 1121  --  --  --  strong by palpation  soft by palpation  --    07/03/19 1115  IUPC (intrauterine pressure catheter)  2-3    --  --  --    07/03/19 1100  IUPC (intrauterine pressure catheter)  2-3  60-80  --  --  --    07/03/19 1045  IUPC (intrauterine pressure catheter)  2-3  60-80  --  --  --    07/03/19 1030  IUPC (intrauterine pressure catheter)  2-4    --  --  --    07/03/19 1015  IUPC (intrauterine pressure catheter)  2-3    --  --  --    07/03/19 1000  IUPC (intrauterine pressure catheter)  2-3    --  --  --     07/03/19 0945  IUPC (intrauterine pressure catheter)  2-3  60-80  --  --  --    07/03/19 0930  IUPC (intrauterine pressure catheter)  2-5    --  --  --    07/03/19 0915  IUPC (intrauterine pressure catheter)  2-3    --  --  --    07/03/19 0900  IUPC (intrauterine pressure catheter)  1-4    --  --  --    07/03/19 0845  IUPC (intrauterine pressure catheter)  2-3    --  --  --    07/03/19 0830  IUPC (intrauterine pressure catheter)  2-3  60-80  --  --  --    07/03/19 0815  IUPC (intrauterine pressure catheter)  2-4  80  --  --  --    07/03/19 0800  IUPC (intrauterine pressure catheter)  1-3    --  --  --    07/03/19 0745  IUPC (intrauterine pressure catheter)  2-3  60-80  --  --  --    07/03/19 0730  IUPC (intrauterine pressure catheter)  1-3    --  --  --    07/03/19 0722  --  --  --  strong by palpation  soft by palpation  --    07/03/19 0715  IUPC (intrauterine pressure catheter)  1-3    --  --  --    07/03/19 0700  IUPC (intrauterine pressure catheter)  1-3  60-90  --  --  --    07/03/19 0653  --  --  --  strong by palpation  soft by palpation  --    07/03/19 0645  IUPC (intrauterine pressure catheter)  1-2    --  --  --    07/03/19 0630  external tocotransducer  poor tracing  --  --  --  --    07/03/19 0615  external tocotransducer  1-3    --  --  --    07/03/19 0600  external tocotransducer  poor tracing  --  --  --  --    07/03/19 0558  --  --  --  moderate by palpation  soft by palpation  --    07/03/19 0545  external tocotransducer  1-3    --  --  --    07/03/19 0530  external tocotransducer  --  --  --  --  Irregular    07/03/19 0515  external tocotransducer  1-3  50-90  --  --  --    07/03/19 0512  --  --  --  moderate by palpation  soft by palpation  --    07/03/19 0500  external tocotransducer  poor tracing  --  --  --  --    07/03/19 0445  external tocotransducer  poor tracing  --  --  --  --    07/03/19 0430  external tocotransducer  1-2  --  --   --  --    07/03/19 0419  palpation;per patient report  --  --  moderate by palpation  soft by palpation  --    07/03/19 0415  external tocotransducer  1-3    --  --  --    07/03/19 0400  external tocotransducer  1-4  50-70  --  --  --    07/03/19 0345  external tocotransducer  --  --  --  --  Irritability    07/03/19 0330  external tocotransducer  poor tracing  --  --  --  --    07/03/19 0315  external tocotransducer  poor tracing  --  --  --  --    07/03/19 0300  external tocotransducer  1-4  50-90  --  --  --    07/03/19 0245  external tocotransducer  1-3  60-80  --  --  --    07/03/19 0230  external tocotransducer  poor tracing  --  --  --  --    07/03/19 0215  external tocotransducer  poor tracing  --  --  --  --    07/03/19 0200  external tocotransducer  2-3  40  --  --  --    07/03/19 0145  external tocotransducer  --  40-60  --  --  --    07/03/19 0130  external tocotransducer  2-3  30  --  --  --    07/03/19 0030  external tocotransducer  -- Poor tracing  --  --  --  --    07/03/19 0000  external tocotransducer  1-2  40-60  --  --  --    07/02/19 2345  external tocotransducer  1-2.5  60-80  --  --  --    07/02/19 2330  external tocotransducer  1-2  40-60  --  --  --    07/02/19 2315  external tocotransducer  -- Poor tracing. Soper adjusted  --  --  --  --    07/02/19 2300  external tocotransducer  -- Poor tracing.   --  --  --  --    07/02/19 2245  external tocotransducer  -- Poor tracing  --  --  --  --    07/02/19 2230  external tocotransducer  -- Poor tracing  --  --  --  --    07/02/19 2215  external tocotransducer  1-2  50-70  --  --  --    07/02/19 2200  external tocotransducer  1-2  50-70  --  --  Tachysystole    07/02/19 2145  external tocotransducer  -- Poor tracing  --  --  --  --    07/02/19 2130  external tocotransducer  1-4  50-60  --  --  --    07/02/19 2115  external tocotransducer  1-3  50-70  --  --  --    07/02/19 2100  external tocotransducer  2-3  40-60  --  --  --    07/02/19 2045   external tocotransducer  2-3  40-70  --  --  --    07/02/19 2015  external tocotransducer  2-3  40-90  --  --  --    07/02/19 1945  external tocotransducer  2-3  60-70  --  --  --    07/02/19 1915  palpation;per patient report;external tocotransducer  2-3  60-70  moderate by palpation  soft by palpation  Irritability    07/02/19 1900  external tocotransducer  2-3    --  --  Irritability    07/02/19 1845  --  2-3  40-80  --  --  Irritability    07/02/19 1830  external tocotransducer  1-2    --  --  --    07/02/19 1815  external tocotransducer  2-4  60-80  --  --  Irritability    07/02/19 1807  --  --  --  moderate by palpation  soft by palpation  --    07/02/19 1800  external tocotransducer  3-6  60-80  --  --  Irritability    07/02/19 1745  external tocotransducer  2-3  50-70  --  --  --    07/02/19 1730  external tocotransducer  2-3    --  --  Irritability    07/02/19 1715  --  1-3    --  --  --    07/02/19 1706  --  --  --  moderate by palpation  soft by palpation  --

## 2019-07-03 NOTE — PROGRESS NOTES
VALERIE Riley  More [place]  : 2000  MRN: [id number]  CSN: [id number]    Labor progress note    Subjective   She is having good pain control with the epidural.     Objective   Min/max vitals past 24 hours:  Temp  Min: 97.9 °F (36.6 °C)  Max: 98.7 °F (37.1 °C)   BP  Min: 106/76  Max: 149/101   Pulse  Min: 61  Max: 139   Resp  Min: 14  Max: 18        FHT's: reassuring and category 2   Cervix: was checked (by me): 5 cm / 80 % / -1   Contractions: regular every 3 minutes        Assessment   1. IUP at 38w0d  2. Fetal status reassuring  3. Contractions adequate  4. GHTN - BPs appropriate      Plan   1. Continue with induction  2. Recheck cervix in 2 hours    Eli Rodriguez MD  7/3/2019  12:57 PM

## 2019-07-03 NOTE — ANESTHESIA PROCEDURE NOTES
Labor Epidural      Patient reassessed immediately prior to procedure    Patient location during procedure: OB  Performed By  Anesthesiologist: Mari Rosenthal DO  Preanesthetic Checklist  Completed: patient identified, surgical consent, pre-op evaluation, timeout performed, IV checked, risks and benefits discussed and monitors and equipment checked  Prep:  Pt Position:sitting  Sterile Tech:cap, gloves, mask and sterile barrier  Prep:DuraPrep  Monitoring:blood pressure monitoring  Epidural Block Procedure:  Approach:midline  Guidance:palpation technique  Location:L3-L4  Needle Type:Tuohy  Needle Gauge:17 G  Loss of Resistance Medium: air  Loss of Resistance: 4cm  Cath Depth at skin:11 cm  Paresthesia: none  Aspiration:negative  Test Dose:negative  Number of Attempts: 1  Post Assessment:  Dressing:occlusive dressing applied and secured with tape  Pt Tolerance:patient tolerated the procedure well with no apparent complications  Complications:no

## 2019-07-03 NOTE — PROGRESS NOTES
VALERIE Provencal  More [place]  : 2000  MRN: [id number]  CSN: [id number]    Labor progress note    Subjective   She is having good pain control with the epidural.     Objective   Min/max vitals past 24 hours:  Temp  Min: 97.9 °F (36.6 °C)  Max: 98.7 °F (37.1 °C)   BP  Min: 106/76  Max: 149/101   Pulse  Min: 61  Max: 116   Resp  Min: 14  Max: 18        FHT's: reassuring and category 1   Cervix: was checked (by RN): 3 cm / 60 % / -3   Contractions: regular every 3 minutes ot 4 minutes        Assessment   1. IUP at 38w0d  2. Fetal status reassuring   3. GHTN - blood pressures reassuring      Plan   1. Continue with induction, increased pitocin to get adequate MVUs  2. S/p AROM this morning and placement of IUPC by laborist   3. Continue PCN for GBS ppx    Eli Rodriguez MD  7/3/2019  8:11 AM

## 2019-07-03 NOTE — ANESTHESIA PREPROCEDURE EVALUATION
Anesthesia Evaluation     Patient summary reviewed and Nursing notes reviewed   NPO Solid Status: > 8 hours  NPO Liquid Status: > 8 hours           Airway   Mallampati: III  TM distance: <3 FB  Neck ROM: full  Possible difficult intubation and Small opening  Dental      Pulmonary - negative pulmonary ROS   Cardiovascular     (+) hypertension,       Neuro/Psych- negative ROS  GI/Hepatic/Renal/Endo - negative ROS     Musculoskeletal (-) negative ROS    Abdominal    Substance History - negative use     OB/GYN    (+) Pregnant, pregnancy induced hypertension        Other - negative ROS                       Anesthesia Plan    ASA 2     epidural     Anesthetic plan, all risks, benefits, and alternatives have been provided, discussed and informed consent has been obtained with: patient.

## 2019-07-04 LAB
HCT VFR BLD AUTO: 33.4 % (ref 34–46.6)
HGB BLD-MCNC: 10.4 G/DL (ref 12–15.9)

## 2019-07-04 PROCEDURE — 85018 HEMOGLOBIN: CPT | Performed by: OBSTETRICS & GYNECOLOGY

## 2019-07-04 PROCEDURE — 25010000002 GENTAMICIN PER 80 MG: Performed by: OBSTETRICS & GYNECOLOGY

## 2019-07-04 PROCEDURE — 25010000002 ONDANSETRON PER 1 MG: Performed by: OBSTETRICS & GYNECOLOGY

## 2019-07-04 PROCEDURE — 85014 HEMATOCRIT: CPT | Performed by: OBSTETRICS & GYNECOLOGY

## 2019-07-04 RX ORDER — SODIUM CHLORIDE, SODIUM LACTATE, POTASSIUM CHLORIDE, CALCIUM CHLORIDE 600; 310; 30; 20 MG/100ML; MG/100ML; MG/100ML; MG/100ML
125 INJECTION, SOLUTION INTRAVENOUS CONTINUOUS
Status: DISCONTINUED | OUTPATIENT
Start: 2019-07-04 | End: 2019-07-06

## 2019-07-04 RX ORDER — SIMETHICONE 80 MG
80 TABLET,CHEWABLE ORAL 4 TIMES DAILY PRN
Status: DISCONTINUED | OUTPATIENT
Start: 2019-07-04 | End: 2019-07-07 | Stop reason: HOSPADM

## 2019-07-04 RX ORDER — DEXTROSE, SODIUM CHLORIDE, SODIUM LACTATE, POTASSIUM CHLORIDE, AND CALCIUM CHLORIDE 5; .6; .31; .03; .02 G/100ML; G/100ML; G/100ML; G/100ML; G/100ML
125 INJECTION, SOLUTION INTRAVENOUS CONTINUOUS
Status: DISCONTINUED | OUTPATIENT
Start: 2019-07-04 | End: 2019-07-04

## 2019-07-04 RX ORDER — DOCUSATE SODIUM 100 MG/1
100 CAPSULE, LIQUID FILLED ORAL 2 TIMES DAILY PRN
Status: DISCONTINUED | OUTPATIENT
Start: 2019-07-04 | End: 2019-07-07 | Stop reason: HOSPADM

## 2019-07-04 RX ORDER — ONDANSETRON 2 MG/ML
4 INJECTION INTRAMUSCULAR; INTRAVENOUS EVERY 6 HOURS PRN
Status: DISCONTINUED | OUTPATIENT
Start: 2019-07-04 | End: 2019-07-07 | Stop reason: HOSPADM

## 2019-07-04 RX ORDER — OXYCODONE HYDROCHLORIDE AND ACETAMINOPHEN 5; 325 MG/1; MG/1
2 TABLET ORAL EVERY 4 HOURS PRN
Status: DISCONTINUED | OUTPATIENT
Start: 2019-07-04 | End: 2019-07-07 | Stop reason: HOSPADM

## 2019-07-04 RX ORDER — PROMETHAZINE HYDROCHLORIDE 25 MG/ML
12.5 INJECTION, SOLUTION INTRAMUSCULAR; INTRAVENOUS EVERY 6 HOURS PRN
Status: DISCONTINUED | OUTPATIENT
Start: 2019-07-04 | End: 2019-07-07 | Stop reason: HOSPADM

## 2019-07-04 RX ORDER — IBUPROFEN 600 MG/1
600 TABLET ORAL EVERY 6 HOURS PRN
Status: DISCONTINUED | OUTPATIENT
Start: 2019-07-04 | End: 2019-07-07 | Stop reason: HOSPADM

## 2019-07-04 RX ORDER — OXYCODONE HYDROCHLORIDE AND ACETAMINOPHEN 5; 325 MG/1; MG/1
1 TABLET ORAL EVERY 4 HOURS PRN
Status: DISCONTINUED | OUTPATIENT
Start: 2019-07-04 | End: 2019-07-07 | Stop reason: HOSPADM

## 2019-07-04 RX ORDER — LANOLIN 100 %
OINTMENT (GRAM) TOPICAL
Status: DISCONTINUED | OUTPATIENT
Start: 2019-07-04 | End: 2019-07-07 | Stop reason: HOSPADM

## 2019-07-04 RX ORDER — PROMETHAZINE HYDROCHLORIDE 12.5 MG/1
12.5 SUPPOSITORY RECTAL EVERY 6 HOURS PRN
Status: DISCONTINUED | OUTPATIENT
Start: 2019-07-04 | End: 2019-07-04

## 2019-07-04 RX ORDER — ONDANSETRON 4 MG/1
4 TABLET, FILM COATED ORAL EVERY 6 HOURS PRN
Status: DISCONTINUED | OUTPATIENT
Start: 2019-07-04 | End: 2019-07-07 | Stop reason: HOSPADM

## 2019-07-04 RX ORDER — SODIUM CHLORIDE, SODIUM LACTATE, POTASSIUM CHLORIDE, CALCIUM CHLORIDE 600; 310; 30; 20 MG/100ML; MG/100ML; MG/100ML; MG/100ML
125 INJECTION, SOLUTION INTRAVENOUS CONTINUOUS
Status: DISCONTINUED | OUTPATIENT
Start: 2019-07-04 | End: 2019-07-04

## 2019-07-04 RX ORDER — NALOXONE HCL 0.4 MG/ML
0.4 VIAL (ML) INJECTION
Status: DISCONTINUED | OUTPATIENT
Start: 2019-07-04 | End: 2019-07-04

## 2019-07-04 RX ORDER — CLINDAMYCIN PHOSPHATE 900 MG/50ML
900 INJECTION INTRAVENOUS EVERY 8 HOURS
Status: COMPLETED | OUTPATIENT
Start: 2019-07-04 | End: 2019-07-04

## 2019-07-04 RX ORDER — DIPHENHYDRAMINE HCL 25 MG
25 CAPSULE ORAL EVERY 4 HOURS PRN
Status: DISCONTINUED | OUTPATIENT
Start: 2019-07-04 | End: 2019-07-07 | Stop reason: HOSPADM

## 2019-07-04 RX ORDER — PROMETHAZINE HYDROCHLORIDE 25 MG/1
25 TABLET ORAL EVERY 6 HOURS PRN
Status: DISCONTINUED | OUTPATIENT
Start: 2019-07-04 | End: 2019-07-07 | Stop reason: HOSPADM

## 2019-07-04 RX ADMIN — CLINDAMYCIN PHOSPHATE 900 MG: 900 INJECTION, SOLUTION INTRAVENOUS at 01:51

## 2019-07-04 RX ADMIN — DOCUSATE SODIUM 100 MG: 100 CAPSULE, LIQUID FILLED ORAL at 08:18

## 2019-07-04 RX ADMIN — SODIUM CHLORIDE, POTASSIUM CHLORIDE, SODIUM LACTATE AND CALCIUM CHLORIDE 125 ML/HR: 600; 310; 30; 20 INJECTION, SOLUTION INTRAVENOUS at 08:34

## 2019-07-04 RX ADMIN — IBUPROFEN 600 MG: 600 TABLET, FILM COATED ORAL at 18:58

## 2019-07-04 RX ADMIN — SODIUM CHLORIDE, POTASSIUM CHLORIDE, SODIUM LACTATE AND CALCIUM CHLORIDE 125 ML/HR: 600; 310; 30; 20 INJECTION, SOLUTION INTRAVENOUS at 00:23

## 2019-07-04 RX ADMIN — DOCUSATE SODIUM 100 MG: 100 CAPSULE, LIQUID FILLED ORAL at 21:26

## 2019-07-04 RX ADMIN — IBUPROFEN 600 MG: 600 TABLET, FILM COATED ORAL at 08:18

## 2019-07-04 RX ADMIN — OXYCODONE HYDROCHLORIDE AND ACETAMINOPHEN 1 TABLET: 5; 325 TABLET ORAL at 14:18

## 2019-07-04 RX ADMIN — ONDANSETRON 4 MG: 2 INJECTION INTRAMUSCULAR; INTRAVENOUS at 08:18

## 2019-07-04 RX ADMIN — IBUPROFEN 600 MG: 600 TABLET, FILM COATED ORAL at 00:19

## 2019-07-04 RX ADMIN — SIMETHICONE CHEW TAB 80 MG 80 MG: 80 TABLET ORAL at 18:58

## 2019-07-04 RX ADMIN — OXYCODONE HYDROCHLORIDE AND ACETAMINOPHEN 2 TABLET: 5; 325 TABLET ORAL at 08:19

## 2019-07-04 RX ADMIN — CLINDAMYCIN PHOSPHATE 900 MG: 900 INJECTION, SOLUTION INTRAVENOUS at 10:06

## 2019-07-04 RX ADMIN — GENTAMICIN SULFATE 320 MG: 40 INJECTION, SOLUTION INTRAMUSCULAR; INTRAVENOUS at 21:26

## 2019-07-04 RX ADMIN — OXYCODONE HYDROCHLORIDE AND ACETAMINOPHEN 1 TABLET: 5; 325 TABLET ORAL at 04:33

## 2019-07-04 RX ADMIN — SIMETHICONE CHEW TAB 80 MG 80 MG: 80 TABLET ORAL at 08:18

## 2019-07-04 RX ADMIN — OXYCODONE HYDROCHLORIDE AND ACETAMINOPHEN 2 TABLET: 5; 325 TABLET ORAL at 18:58

## 2019-07-04 RX ADMIN — SIMETHICONE CHEW TAB 80 MG 80 MG: 80 TABLET ORAL at 14:18

## 2019-07-04 NOTE — PROGRESS NOTES
VALERIE Olancha  [place]  : 2000  MRN: [id number]  CSN: [id number]    Labor progress note    Subjective   She is having no problems.     Objective   Min/max vitals past 24 hours:  Temp  Min: 98 °F (36.7 °C)  Max: 99.6 °F (37.6 °C)   BP  Min: 106/76  Max: 147/97   Pulse  Min: 61  Max: 139   Resp  Min: 14  Max: 20        FHT's: reassuring and category 1   Cervix: was checked (by me): 7 cm / 90 % / 0   Contractions: regular with MVU's > 180        Assessment   1. IUP at 38w0d  2. Fetal status reassuring  3. Arrest of dilation  4. Chorioamnionitis     Plan   1.  section   2. Dose penicillin as prophylaxis  3. Will need clindamycin/gentamicin after delivery to reduce the probability of endomyometritis    Everardo South MD  7/3/2019  9:30 PM

## 2019-07-04 NOTE — PROGRESS NOTES
VALERIE Elkton  More [place]  : 2000  MRN: [id number]  CSN: [id number]    Hospital Day: 3    Post-operative Day #1  Subjective   Her pain is well controlled. Vaginal bleeding is normal in amount.     Objective     Min/max vitals past 24 hours:   Temp  Min: 97.7 °F (36.5 °C)  Max: 99.6 °F (37.6 °C)  BP  Min: 111/61  Max: 152/95  Pulse  Min: 92  Max: 139  Resp  Min: 14  Max: 20        General: well developed; well nourished  no acute distress   Abdomen: soft, non-tender; no masses  no umbilical or inguinal hernias are present  no hepato-splenomegaly  incision is clean, dry, intact and without drainage   Pelvic: Not performed   Ext: Calves NT     Last 3 values   Results from last 7 days   Lab Units 19  0609 19  1047   WBC 10*3/mm3  --  7.71   HEMOGLOBIN g/dL 10.4* 11.3*   HEMATOCRIT % 33.4* 36.1   PLATELETS 10*3/mm3  --  255     Lab Results   Component Value Date    RH Positive 2019    HEPBSAG Non-Reactive 2018          Assessment   1. POD #1 S/P Primary  (LTCS)  - 2 layer closure  2. Doing well     Plan   1. Continue routine post-operative care   2. Continue ABx for 48 hours then d/c if remains afebrile    Everardo South MD  2019  8:03 AM

## 2019-07-04 NOTE — PLAN OF CARE
Problem: Patient Care Overview  Goal: Plan of Care Review  Outcome: Ongoing (interventions implemented as appropriate)   19   Coping/Psychosocial   Plan of Care Reviewed With patient   Plan of Care Review   Progress improving   OTHER   Outcome Summary Fundus/lochia/incision WDL, BP high but within parameters, pain well controlled w/ meds, experiencing some post op nausea     Goal: Individualization and Mutuality  Outcome: Ongoing (interventions implemented as appropriate)   19   Individualization   Patient Specific Preferences bottlefeeding   Patient Specific Goals (Include Timeframe) pain control   Patient Specific Interventions offer pain meds prn     Goal: Discharge Needs Assessment  Outcome: Ongoing (interventions implemented as appropriate)   19   Discharge Needs Assessment   Concerns to be Addressed no discharge needs identified       Problem: Postpartum ( Delivery) (Adult,Obstetrics,Pediatric)  Goal: Signs and Symptoms of Listed Potential Problems Will be Absent, Minimized or Managed (Postpartum)  Outcome: Ongoing (interventions implemented as appropriate)   19   Goal/Outcome Evaluation   Problems Assessed (Postpartum ) all   Problems Present (Postpartum ) pain;postoperative nausea and vomiting     Goal: Anesthesia/Sedation Recovery  Outcome: Ongoing (interventions implemented as appropriate)   19   Goal/Outcome Evaluation   Anesthesia/Sedation Recovery criteria met for discharge

## 2019-07-04 NOTE — ANESTHESIA POSTPROCEDURE EVALUATION
Patient: More Gaxiola    Procedure Summary     Date:  19 Room / Location:  [facility] LABOR DELIVERY   [facility]    Anesthesia Start:  730 Anesthesia Stop:      Procedure:   SECTION PRIMARY (N/A Abdomen) Diagnosis:      Surgeon:  Everardo South MD Provider:  Mari Rosenthal DO    Anesthesia Type:  epidural ASA Status:  2          Anesthesia Type: epidural  Last vitals  /61     Temp 98.4     Pulse 125     Resp   16     SpO2    95     Post Anesthesia Care and Evaluation    Patient location during evaluation: bedside  Patient participation: complete - patient participated  Level of consciousness: awake and awake and alert  Pain score: 0  Pain management: satisfactory to patient  Airway patency: patent  Anesthetic complications: No anesthetic complications  PONV Status: none  Cardiovascular status: acceptable  Respiratory status: acceptable  Hydration status: acceptable  Post Neuraxial Block status: No signs or symptoms of PDPH

## 2019-07-04 NOTE — CONSULTS
Continued Stay Note   [place]     Patient Name: More Gaxiola  MRN: [id number]  Today's Date: 7/4/2019    Admit Date: 7/2/2019    Discharge Plan     Row Name 07/04/19 1109       Plan    Plan  Social work provided NICU support information to the mother of the baby. Provided support to the mother of the baby. There are no social work needs at this time and the primary  will be following the baby and provide support for the mother of the baby.     Patient/Family in Agreement with Plan  yes        Discharge Codes    No documentation.             JULIEN Patel

## 2019-07-04 NOTE — PROGRESS NOTES
Axillary temperature now 99.6  We will begin antibiotics for the presumed diagnosis of chorioamnionitis      Everardo South M.D.  July 3, 2019  8:38 PM

## 2019-07-04 NOTE — OP NOTE
[place]  More Gaxiola  : 2000  MRN: [id number]  CSN: [id number]     Section Operative Note    Pre-Operative Dx:   1. Intrauterine pregnancy at 38w0d weeks   2. Chorioamnionitis  3. Failure to progress: arrest of dilation      Postoperative dx:    1. Intrauterine pregnancy at 38w0d weeks 2.   Chorioamnionitis  3. Failure to progress: arrest of dilation   4. Occiput posterior  5. Nuchal cord        Procedure: Primary  (LTCS)  - 2 layer closure       Surgeon: Everardo South MD   Assistant: Dave Potts       Anesthesia: Epidural        EBL: 700 mls.   IV Fluids: 2000 mls.   UOP: 25 mls.     Antibiotics:  Penicillin and gentamicin     Infant      Name:  John      Gender: male  infant    Weight: 3360 g (7 lb 6.5 oz)     Apgars: 4   @ 1 minute / 9   @ 5 minutes     Procedure Details:   After the patient was adequately anesthetized, she was sterilely prepped and draped in the dorsal supine left lateral tilt position. A Pfannenstiel incision was created sharply with the knife. It was carried down to the fascia with the Bovie.  The fascia was cut transversely with the knife and extended in a curvilinear fashion with scissors. The fascia was freed from its midline insertion superiorly and inferiorly. Rectus muscles were  in the midline. The peritoneum was sharply entered and a bladder flap was sharply created. The lower uterine segment was scored transversely with the knife. Clear amniotic fluid was seen. The infant's was in vertex presentation.  The head was delivered atraumatically in flexion.  A nuchal cord was reduced.  The mouth and nose were bulb suctioned.  The cord was clamped and the infant was handed to the delivery team which was in attendance.  Cord gases were obtained.  The placenta was spontaneously extracted. The uterus was exteriorized and wiped free of debris and clot.  There was an extension from the right corner inferiorly which tracked laterally.   The uterine vessels were spared.  The uterine incision was closed with #1 chromic in a continuous running locking fashion. A second #1 chromic was used to imbricate across the first.  Several of a figure-of-eight sutures were placed in the right corner for hemostasis which was achieved.  Surgicel powder was placed.  The bladder flap was reapproximated.    The uterus was returned to the abdomen. The paracolic gutters were cleared of debris and clot. Parietal peritoneum and posterior rectus sheath were closed with 3-0 Vicryl. Rectus muscles were approximated with #1 chromic. The fascia was closed with 0 PDS sewing from either corner, tying in the midline and burying all knots. The subcutaneous tissue was copiously irrigated. Kavya's fascia was closed with 3-0 Vicryl and the skin was closed with the Insorb stapler and skin glue was applied.  All counts were correct.     Due to her diagnosis of chorioamnionitis the plan will be to continue her for 48 hours on clindamycin and gentamicin.        Complications:   None      Disposition:   Mother to Mother Baby/Postpartum  in stable condition currently.   Baby to transitional care  in stable condition currently.       Everardo South MD  7/3/2019  10:59 PM

## 2019-07-05 PROCEDURE — 99024 POSTOP FOLLOW-UP VISIT: CPT | Performed by: OBSTETRICS & GYNECOLOGY

## 2019-07-05 PROCEDURE — 25010000002 GENTAMICIN PER 80 MG: Performed by: OBSTETRICS & GYNECOLOGY

## 2019-07-05 RX ADMIN — IBUPROFEN 600 MG: 600 TABLET, FILM COATED ORAL at 18:57

## 2019-07-05 RX ADMIN — DOCUSATE SODIUM 100 MG: 100 CAPSULE, LIQUID FILLED ORAL at 08:14

## 2019-07-05 RX ADMIN — OXYCODONE HYDROCHLORIDE AND ACETAMINOPHEN 1 TABLET: 5; 325 TABLET ORAL at 08:14

## 2019-07-05 RX ADMIN — OXYCODONE HYDROCHLORIDE AND ACETAMINOPHEN 1 TABLET: 5; 325 TABLET ORAL at 18:57

## 2019-07-05 RX ADMIN — DOCUSATE SODIUM 100 MG: 100 CAPSULE, LIQUID FILLED ORAL at 22:11

## 2019-07-05 RX ADMIN — SIMETHICONE CHEW TAB 80 MG 80 MG: 80 TABLET ORAL at 22:13

## 2019-07-05 RX ADMIN — SIMETHICONE CHEW TAB 80 MG 80 MG: 80 TABLET ORAL at 08:14

## 2019-07-05 RX ADMIN — SIMETHICONE CHEW TAB 80 MG 80 MG: 80 TABLET ORAL at 12:41

## 2019-07-05 RX ADMIN — OXYCODONE HYDROCHLORIDE AND ACETAMINOPHEN 1 TABLET: 5; 325 TABLET ORAL at 12:41

## 2019-07-05 RX ADMIN — OXYCODONE HYDROCHLORIDE AND ACETAMINOPHEN 1 TABLET: 5; 325 TABLET ORAL at 00:02

## 2019-07-05 RX ADMIN — GENTAMICIN SULFATE 320 MG: 40 INJECTION, SOLUTION INTRAMUSCULAR; INTRAVENOUS at 22:05

## 2019-07-05 RX ADMIN — IBUPROFEN 600 MG: 600 TABLET, FILM COATED ORAL at 08:14

## 2019-07-05 RX ADMIN — SIMETHICONE CHEW TAB 80 MG 80 MG: 80 TABLET ORAL at 18:57

## 2019-07-05 NOTE — PAYOR COMM NOTE
"32GonzaBarry suarez (19 y.o. Female)   Auth#[id number]  Delivery Information     Date of Birth Social Security Number Address Home Phone MRN    2000  [address] [phone] [id number]    [employer] Marital Status          None Single       Admission Date Admission Type Admitting Provider Attending Provider Department, Room/Bed    7/2/19 Elective Eli Rodriguez MD Baylon, Mary Beth, MD [facility] MOTHER BABY 4B, N428/1    Discharge Date Discharge Disposition Discharge Destination                       Attending Provider:  Eli Rodriguez MD    Allergies:  No Known Allergies    Isolation:  None   Infection:  None   Code Status:  CPR    Ht:  154.9 cm (61\")   Wt:  85.7 kg (189 lb)    Admission Cmt:  None   Principal Problem:  Postpartum care following LTCS 7/3/19 - John [Z39.2]               Active Insurance as of 7/2/2019     Primary Coverage     Payor Plan Insurance Group Employer/Plan Group    [employer] [place] [employer] [place]ICA[place]      Payor Plan Address Payor Plan Phone Number Payor Plan Fax Number Effective Dates    [address] [address] [phone]  12/11/2018 - None Entered    [facility] [id number]       Subscriber Name Subscriber Birth Date Member ID       BARRY BASSETT 2000 [id number]                 Emergency Contacts      (Rel.) Home Phone Work Phone Mobile Phone    Yazmin Charles (Mother) [phone] -- --    bassem slade (Significant Other) -- -- [phone]            Hospital Medications (active)       Dose Frequency Start End    clindamycin (CLEOCIN) 900 mg in dextrose 5% 50 mL IVPB (premix) 900 mg Every 8 Hours 7/4/2019 7/4/2019    Sig - Route: Infuse 50 mL into a venous catheter Every 8 (Eight) Hours. - Intravenous    diphenhydrAMINE (BENADRYL) capsule 25 mg 25 mg Every 4 Hours PRN 7/4/2019     Sig - Route: Take 1 capsule by mouth Every 4 (Four) Hours As Needed for Itching. - Oral    docusate sodium (COLACE) " "capsule 100 mg 100 mg 2 Times Daily PRN 7/4/2019     Sig - Route: Take 1 capsule by mouth 2 (Two) Times a Day As Needed for Constipation. - Oral    gentamicin (GARAMYCIN) 320 mg in sodium chloride 0.9 % IVPB 5 mg/kg × 63 kg (Adjusted) Every 24 Hours 7/4/2019 7/6/2019    Sig - Route: Infuse 320 mg into a venous catheter Daily. - Intravenous    ibuprofen (ADVIL,MOTRIN) tablet 600 mg 600 mg Every 6 Hours PRN 7/4/2019     Sig - Route: Take 1 tablet by mouth Every 6 (Six) Hours As Needed for Mild Pain . - Oral    lactated ringers infusion 125 mL/hr Continuous 7/4/2019     Sig - Route: Infuse 125 mL/hr into a venous catheter Continuous. - Intravenous    Cosign for Ordering: Accepted by Everardo South MD on 7/5/2019  7:58 AM    lanolin ointment  Every 1 Hour PRN 7/4/2019     Sig - Route: Apply  topically to the appropriate area as directed Every 1 (One) Hour As Needed for Dry Skin (nipple pain). - Topical    ondansetron (ZOFRAN) injection 4 mg 4 mg Every 6 Hours PRN 7/4/2019     Sig - Route: Infuse 2 mL into a venous catheter Every 6 (Six) Hours As Needed for Nausea or Vomiting. - Intravenous    Linked Group 1:  \"Or\" Linked Group Details        ondansetron (ZOFRAN) tablet 4 mg 4 mg Every 6 Hours PRN 7/4/2019     Sig - Route: Take 1 tablet by mouth Every 6 (Six) Hours As Needed for Nausea or Vomiting. - Oral    Linked Group 1:  \"Or\" Linked Group Details        oxyCODONE-acetaminophen (PERCOCET) 5-325 MG per tablet 1 tablet 1 tablet Every 4 Hours PRN 7/4/2019     Sig - Route: Take 1 tablet by mouth Every 4 (Four) Hours As Needed for Moderate Pain . - Oral    Linked Group 2:  \"Or\" Linked Group Details        oxyCODONE-acetaminophen (PERCOCET) 5-325 MG per tablet 2 tablet 2 tablet Every 4 Hours PRN 7/4/2019     Sig - Route: Take 2 tablets by mouth Every 4 (Four) Hours As Needed for Severe Pain . - Oral    Linked Group 2:  \"Or\" Linked Group Details        Pharmacy to Dose gentamicin (GARAMYCIN)  Continuous PRN 7/4/2019 " "7/6/2019    Sig - Route: Continuous As Needed for Consult. - Does not apply    polyethylene glycol 3350 powder (packet) 17 g Daily PRN 7/4/2019     Sig - Route: Take 17 g by mouth Daily As Needed for Constipation. - Oral    promethazine (PHENERGAN) injection 12.5 mg 12.5 mg Every 6 Hours PRN 7/4/2019     Sig - Route: Inject 0.5 mL into the appropriate muscle as directed by prescriber Every 6 (Six) Hours As Needed for Nausea or Vomiting. - Intramuscular    Linked Group 3:  \"Or\" Linked Group Details        promethazine (PHENERGAN) tablet 25 mg 25 mg Every 6 Hours PRN 7/4/2019     Sig - Route: Take 1 tablet by mouth Every 6 (Six) Hours As Needed for Nausea or Vomiting. - Oral    Linked Group 3:  \"Or\" Linked Group Details        simethicone (MYLICON) chewable tablet 80 mg 80 mg 4 Times Daily PRN 7/4/2019     Sig - Route: Chew 1 tablet 4 (Four) Times a Day As Needed for Flatulence. - Oral    Tdap (BOOSTRIX) injection 0.5 mL 0.5 mL Once 7/4/2019     Sig - Route: Inject 0.5 mL into the appropriate muscle as directed by prescriber 1 (One) Time. - Intramuscular          Lab Results (last 48 hours)     Procedure Component Value Units Date/Time    Hemoglobin & Hematocrit, Blood [[id number]]  (Abnormal) Collected:  07/04/19 0609    Specimen:  Blood Updated:  07/04/19 0703     Hemoglobin 10.4 g/dL      Hematocrit 33.4 %     Blood Gas, Venous, Cord [[id number]]  (Abnormal) Collected:  07/03/19 2232    Specimen:  Cord Blood Venous from Umbilical Cord Updated:  07/03/19 2232     Site Umbilical     pH, Cord Venous 7.283 pH Units      pCO2, Cord Venous 45.6 mm Hg      pO2, Cord Venous 9.0 mm Hg      HCO3, Cord Venous 21.6 mmol/L      Base Excess, Cord Venous -5.2 mmol/L      O2 Sat, Cord Venous 11.2 %      Hemoglobin, Blood Gas 14.8 g/dL      CO2 Content 23.0 mmol/L      Temperature 37.0 C      Barometric Pressure for Blood Gas -- mmHg      Comment: N/A        Modality Room Air     Comment: Meter: [id number]     :  " [id number]        FIO2 21 %      O2 Saturation Calculated -- %      Comment: Calculated O2 saturation result not reported at this site.        Note --    Blood Gas, Arterial, Cord [[id number]]  (Abnormal) Collected:  07/03/19 2231    Specimen:  Cord Blood Arterial from Umbilical Cord Updated:  07/03/19 2231     Site Umbilical     pH, Cord Arterial 7.25 pH Units      pCO2, Cord Arterial 52.7 mmHg      pO2, Cord Arterial 0.1 mmHg      HCO3, Cord Arterial 22.8 mmol/L      Base Exc, Cord Arterial -5.1 mmol/L      O2 Sat, Cord Arterial -- %      Comment: 94 Value below reportable range < 4.4        Hemoglobin, Blood Gas 14.9 g/dL      CO2 Content 24.5 mmol/L      Temperature 37.0 C      Barometric Pressure for Blood Gas -- mmHg      Comment: N/A        Modality Room Air     Comment: Meter: [id number]     :  [id number]        FIO2 21 %      Note --          Lab Results (last 48 hours)     Procedure Component Value Units Date/Time    Hemoglobin & Hematocrit, Blood [[id number]]  (Abnormal) Collected:  07/04/19 0609    Specimen:  Blood Updated:  07/04/19 0703     Hemoglobin 10.4 g/dL      Hematocrit 33.4 %     Blood Gas, Venous, Cord [[id number]]  (Abnormal) Collected:  07/03/19 2232    Specimen:  Cord Blood Venous from Umbilical Cord Updated:  07/03/19 2232     Site Umbilical     pH, Cord Venous 7.283 pH Units      pCO2, Cord Venous 45.6 mm Hg      pO2, Cord Venous 9.0 mm Hg      HCO3, Cord Venous 21.6 mmol/L      Base Excess, Cord Venous -5.2 mmol/L      O2 Sat, Cord Venous 11.2 %      Hemoglobin, Blood Gas 14.8 g/dL      CO2 Content 23.0 mmol/L      Temperature 37.0 C      Barometric Pressure for Blood Gas -- mmHg      Comment: N/A        Modality Room Air     Comment: Meter: [id number]     :  [id number]        FIO2 21 %      O2 Saturation Calculated -- %      Comment: Calculated O2 saturation result not reported at this site.        Note --    Blood Gas, Arterial, Cord [[id number]]  (Abnormal) Collected:   19    Specimen:  Cord Blood Arterial from Umbilical Cord Updated:  19     Site Umbilical     pH, Cord Arterial 7.25 pH Units      pCO2, Cord Arterial 52.7 mmHg      pO2, Cord Arterial 0.1 mmHg      HCO3, Cord Arterial 22.8 mmol/L      Base Exc, Cord Arterial -5.1 mmol/L      O2 Sat, Cord Arterial -- %      Comment: 94 Value below reportable range < 4.4        Hemoglobin, Blood Gas 14.9 g/dL      CO2 Content 24.5 mmol/L      Temperature 37.0 C      Barometric Pressure for Blood Gas -- mmHg      Comment: N/A        Modality Room Air     Comment: Meter: [id number]     :  [id number]        FIO2 21 %      Note --           Physician Progress Notes (last 48 hours) (Notes from 7/3/2019  8:33 AM through 2019  8:33 AM)      Everardo South MD at 2019  8:03 AM           [place]  More Vogel Minor  : 2000  MRN: [id number]  CSN: [id number]    Hospital Day: 3    Post-operative Day #1  Subjective   Her pain is well controlled. Vaginal bleeding is normal in amount.    Objective     Min/max vitals past 24 hours:   Temp  Min: 97.7 °F (36.5 °C)  Max: 99.6 °F (37.6 °C)  BP  Min: 111/61  Max: 152/95  Pulse  Min: 92  Max: 139  Resp  Min: 14  Max: 20        General: well developed; well nourished  no acute distress   Abdomen: soft, non-tender; no masses  no umbilical or inguinal hernias are present  no hepato-splenomegaly  incision is clean, dry, intact and without drainage   Pelvic: Not performed   Ext: Calves NT     Last 3 values   Results from last 7 days   Lab Units 19  0609 19  1047   WBC 10*3/mm3  --  7.71   HEMOGLOBIN g/dL 10.4* 11.3*   HEMATOCRIT % 33.4* 36.1   PLATELETS 10*3/mm3  --  255     Lab Results   Component Value Date    RH Positive 2019    HEPBSAG Non-Reactive 2018         Assessment   1. POD #1 S/P Primary  (LTCS)  - 2 layer closure  2. Doing well    Plan   1. Continue routine post-operative care   2. Continue ABx for 48  hours then d/c if remains afebrile    Everardo South MD  2019  8:03 AM          Electronically signed by Everardo South MD at 2019  8:05 AM     Everardo South MD at 7/3/2019  9:30 PM           Sin Gaxiola  : 2000  MRN: [id number]  CSN: [id number]    Labor progress note    Subjective   She is having no problems.    Objective   Min/max vitals past 24 hours:  Temp  Min: 98 °F (36.7 °C)  Max: 99.6 °F (37.6 °C)   BP  Min: 106/76  Max: 147/97   Pulse  Min: 61  Max: 139   Resp  Min: 14  Max: 20        FHT's: reassuring and category 1   Cervix: was checked (by me): 7 cm / 90 % / 0   Contractions: regular with MVU's > 180       Assessment   3. IUP at 38w0d  4. Fetal status reassuring  5. Arrest of dilation  6. Chorioamnionitis    Plan   3.  section   4. Dose penicillin as prophylaxis  5. Will need clindamycin/gentamicin after delivery to reduce the probability of endomyometritis    Everardo South MD  7/3/2019  9:30 PM         Electronically signed by Everardo South MD at 7/3/2019  9:31 PM     Everardo South MD at 7/3/2019  8:38 PM        Axillary temperature now 99.6  We will begin antibiotics for the presumed diagnosis of chorioamnionitis      Everardo South M.D.  July 3, 2019  8:38 PM      Electronically signed by Everardo South MD at 7/3/2019  8:38 PM     Eli Rodriguez MD at 7/3/2019  5:23 PM           Sin Gaxiola  : 2000  MRN: [id number]  CSN: [id number]    Labor progress note    Subjective   She is having good pain control with the epidural. She just had a re-bolus    Objective   Min/max vitals past 24 hours:  Temp  Min: 97.9 °F (36.6 °C)  Max: 99.1 °F (37.3 °C)   BP  Min: 106/76  Max: 149/101   Pulse  Min: 61  Max: 139   Resp  Min: 14  Max: 18        FHT's: reassuring and category 1   Cervix: was checked (by me): 6-7 cm / 90 % / -1   Contractions: regular every 2-3 minutes        Assessment   7. IUP at 38w0d  8. Fetal status reassuring  9. Contractions adequate  10. GHTN    Plan   6. Continue with induction   7. Signing out to on call physician Dr. South.     Eli Rodriguez MD  7/3/2019  5:23 PM           Electronically signed by Eli Rodriguez MD at 7/3/2019  6:08 PM     Eli Rodriguez MD at 7/3/2019 12:57 PM           Sin Gaxiola  : 2000  MRN: [id number]  CSN: [id number]    Labor progress note    Subjective   She is having good pain control with the epidural.    Objective   Min/max vitals past 24 hours:  Temp  Min: 97.9 °F (36.6 °C)  Max: 98.7 °F (37.1 °C)   BP  Min: 106/76  Max: 149/101   Pulse  Min: 61  Max: 139   Resp  Min: 14  Max: 18        FHT's: reassuring and category 2   Cervix: was checked (by me): 5 cm / 80 % / -1   Contractions: regular every 3 minutes       Assessment   11. IUP at 38w0d  12. Fetal status reassuring  13. Contractions adequate  14. GHTN - BPs appropriate     Plan   8. Continue with induction  9. Recheck cervix in 2 hours    Eli Rodriguez MD  7/3/2019  12:57 PM           Electronically signed by Eli Rodriguez MD at 7/3/2019 12:58 PM       Consult Notes (last 48 hours) (Notes from 7/3/2019  8:33 AM through 2019  8:33 AM)     No notes of this type exist for this encounter.

## 2019-07-05 NOTE — PLAN OF CARE
Problem: Patient Care Overview  Goal: Plan of Care Review  Outcome: Ongoing (interventions implemented as appropriate)   19 0501   Coping/Psychosocial   Plan of Care Reviewed With patient   Plan of Care Review   Progress improving   OTHER   Outcome Summary doing well, ambulating , voiding without problems, incision clean & dry, VSS       Problem: Postpartum ( Delivery) (Adult,Obstetrics,Pediatric)  Goal: Signs and Symptoms of Listed Potential Problems Will be Absent, Minimized or Managed (Postpartum)  Outcome: Ongoing (interventions implemented as appropriate)

## 2019-07-05 NOTE — PROGRESS NOTES
VALERIE Sin Gaxiola  : 2000  MRN: [id number]  CSN: [id number]    Post-operative Day #2  Subjective   Her pain is well controlled. Vaginal bleeding is normal in amount. She is ambulating without difficulty. She is passing gas. She is voiding without difficulty. Her baby is doing well.     Objective     Min/max vitals past 24 hours:   Temp  Min: 98 °F (36.7 °C)  Max: 98.7 °F (37.1 °C)  BP  Min: 111/77  Max: 122/86  Pulse  Min: 88  Max: 113  Resp  Min: 16  Max: 18        General: well developed; well nourished  no acute distress   Abdomen: soft, non-tender; no masses  no umbilical or inguinal hernias are present  no hepato-splenomegaly  incision is healing, clean, dry, intact and without drainage   Pelvic: Not performed   Ext: Calves NT     Results from last 7 days   Lab Units 19  0609 19  1047   WBC 10*3/mm3  --  7.71   HEMOGLOBIN g/dL 10.4* 11.3*   HEMATOCRIT % 33.4* 36.1   PLATELETS 10*3/mm3  --  255     Lab Results   Component Value Date    RUBELLAABIGG 251.7 2018    RUBELLAABIGG Immune 2018    ABO O 2019    RH Positive 2019    HEPBSAG Non-Reactive 2018        Assessment   1. POD #2 S/P Primary  (LTCS)  - 1 layer closure     Plan   1. Continue routine post-operative care  2. Ambulate    William Mcqueen MD  2019  10:51 AM

## 2019-07-06 PROCEDURE — 90715 TDAP VACCINE 7 YRS/> IM: CPT | Performed by: OBSTETRICS & GYNECOLOGY

## 2019-07-06 PROCEDURE — 25010000002 TDAP 5-2.5-18.5 LF-MCG/0.5 SUSPENSION: Performed by: OBSTETRICS & GYNECOLOGY

## 2019-07-06 PROCEDURE — 90471 IMMUNIZATION ADMIN: CPT | Performed by: OBSTETRICS & GYNECOLOGY

## 2019-07-06 PROCEDURE — 99024 POSTOP FOLLOW-UP VISIT: CPT | Performed by: OBSTETRICS & GYNECOLOGY

## 2019-07-06 RX ADMIN — OXYCODONE HYDROCHLORIDE AND ACETAMINOPHEN 1 TABLET: 5; 325 TABLET ORAL at 01:12

## 2019-07-06 RX ADMIN — OXYCODONE HYDROCHLORIDE AND ACETAMINOPHEN 1 TABLET: 5; 325 TABLET ORAL at 22:12

## 2019-07-06 RX ADMIN — OXYCODONE HYDROCHLORIDE AND ACETAMINOPHEN 1 TABLET: 5; 325 TABLET ORAL at 17:32

## 2019-07-06 RX ADMIN — TETANUS TOXOID, REDUCED DIPHTHERIA TOXOID AND ACELLULAR PERTUSSIS VACCINE, ADSORBED 0.5 ML: 5; 2.5; 8; 8; 2.5 SUSPENSION INTRAMUSCULAR at 17:32

## 2019-07-06 RX ADMIN — SIMETHICONE CHEW TAB 80 MG 80 MG: 80 TABLET ORAL at 17:32

## 2019-07-06 RX ADMIN — DOCUSATE SODIUM 100 MG: 100 CAPSULE, LIQUID FILLED ORAL at 17:32

## 2019-07-06 RX ADMIN — IBUPROFEN 600 MG: 600 TABLET, FILM COATED ORAL at 17:32

## 2019-07-06 RX ADMIN — IBUPROFEN 600 MG: 600 TABLET, FILM COATED ORAL at 01:11

## 2019-07-06 NOTE — PLAN OF CARE
Problem: Patient Care Overview  Goal: Plan of Care Review  Outcome: Ongoing (interventions implemented as appropriate)   19 0501 19 0500   Coping/Psychosocial   Plan of Care Reviewed With --  patient;significant other   Plan of Care Review   Progress --  improving   OTHER   Outcome Summary doing well, ambulating , voiding without problems, incision clean & dry, VSS --      Goal: Individualization and Mutuality  Outcome: Ongoing (interventions implemented as appropriate)   19 0444   Individualization   Patient Specific Preferences bottlefeeding   Patient Specific Goals (Include Timeframe) pain control   Patient Specific Interventions offer pain meds prn     Goal: Discharge Needs Assessment  Outcome: Ongoing (interventions implemented as appropriate)   19 0444   Discharge Needs Assessment   Concerns to be Addressed no discharge needs identified       Problem: Postpartum ( Delivery) (Adult,Obstetrics,Pediatric)  Goal: Signs and Symptoms of Listed Potential Problems Will be Absent, Minimized or Managed (Postpartum)  Outcome: Ongoing (interventions implemented as appropriate)   19 0501   Goal/Outcome Evaluation   Problems Assessed (Postpartum ) all   Problems Present (Postpartum ) none

## 2019-07-06 NOTE — PROGRESS NOTES
VALERIE Sin Gaxiola  : 2000  MRN: [id number]  CSN: [id number]    Post-operative Day #3  Subjective   Her pain is well controlled. Vaginal bleeding is normal in amount. She is ambulating without difficulty. She is passing gas. She is voiding without difficulty. Her baby is in the NICU     Objective     Min/max vitals past 24 hours:   Temp  Min: 98 °F (36.7 °C)  Max: 98.5 °F (36.9 °C)  BP  Min: 116/76  Max: 134/87  Pulse  Min: 82  Max: 112  Resp  Min: 16  Max: 18        General: well developed; well nourished  no acute distress   Abdomen: soft, non-tender; no masses  no umbilical or inguinal hernias are present  no hepato-splenomegaly  incision is clean, dry, intact and without drainage   Pelvic: Not performed   Ext: Calves NT     Results from last 7 days   Lab Units 19  0609 19  1047   WBC 10*3/mm3  --  7.71   HEMOGLOBIN g/dL 10.4* 11.3*   HEMATOCRIT % 33.4* 36.1   PLATELETS 10*3/mm3  --  255     Lab Results   Component Value Date    RH Positive 2019    HEPBSAG Non-Reactive 2018        Assessment   1. POD #3 S/P Primary  (LTCS)  - 2 layer closure  2. Doing well     Plan   1. Continue routine postpartum care  2. Ambulate   3. Anticipate discharge to home tomorrow     Eli Rodriguez MD  2019  10:21 AM

## 2019-07-07 VITALS
TEMPERATURE: 98.9 F | SYSTOLIC BLOOD PRESSURE: 128 MMHG | RESPIRATION RATE: 16 BRPM | HEART RATE: 94 BPM | WEIGHT: 189 LBS | BODY MASS INDEX: 35.68 KG/M2 | OXYGEN SATURATION: 97 % | DIASTOLIC BLOOD PRESSURE: 94 MMHG | HEIGHT: 61 IN

## 2019-07-07 PROBLEM — O35.BXX0 FETAL CARDIAC ANOMALY COMPLICATING PREGNANCY, ANTEPARTUM: Status: RESOLVED | Noted: 2019-05-22 | Resolved: 2019-07-07

## 2019-07-07 PROCEDURE — 99024 POSTOP FOLLOW-UP VISIT: CPT | Performed by: OBSTETRICS & GYNECOLOGY

## 2019-07-07 RX ORDER — OXYCODONE HYDROCHLORIDE AND ACETAMINOPHEN 5; 325 MG/1; MG/1
1 TABLET ORAL EVERY 4 HOURS PRN
Qty: 25 TABLET | Refills: 0 | Status: SHIPPED | OUTPATIENT
Start: 2019-07-07 | End: 2020-02-18

## 2019-07-07 RX ORDER — FERROUS SULFATE 325(65) MG
325 TABLET ORAL
Qty: 60 TABLET | Refills: 10 | Status: SHIPPED | OUTPATIENT
Start: 2019-07-07 | End: 2020-02-18

## 2019-07-07 RX ORDER — IBUPROFEN 600 MG/1
600 TABLET ORAL EVERY 6 HOURS PRN
Qty: 60 TABLET | Refills: 1 | Status: SHIPPED | OUTPATIENT
Start: 2019-07-07 | End: 2020-02-18

## 2019-07-07 RX ORDER — PSEUDOEPHEDRINE HCL 30 MG
100 TABLET ORAL 2 TIMES DAILY PRN
Qty: 60 EACH | Refills: 1 | Status: SHIPPED | OUTPATIENT
Start: 2019-07-07 | End: 2020-02-18

## 2019-07-07 RX ADMIN — OXYCODONE HYDROCHLORIDE AND ACETAMINOPHEN 1 TABLET: 5; 325 TABLET ORAL at 04:10

## 2019-07-07 RX ADMIN — IBUPROFEN 600 MG: 600 TABLET, FILM COATED ORAL at 08:25

## 2019-07-07 RX ADMIN — OXYCODONE HYDROCHLORIDE AND ACETAMINOPHEN 1 TABLET: 5; 325 TABLET ORAL at 08:25

## 2019-07-07 NOTE — PLAN OF CARE
Problem: Patient Care Overview  Goal: Plan of Care Review  Outcome: Ongoing (interventions implemented as appropriate)   19 0755   Coping/Psychosocial   Plan of Care Reviewed With patient   Plan of Care Review   Progress improving   OTHER   Outcome Summary incision well approximated     Goal: Individualization and Mutuality  Outcome: Ongoing (interventions implemented as appropriate)    Goal: Discharge Needs Assessment  Outcome: Ongoing (interventions implemented as appropriate)    Goal: Interprofessional Rounds/Family Conf  Outcome: Ongoing (interventions implemented as appropriate)      Problem: Postpartum ( Delivery) (Adult,Obstetrics,Pediatric)  Goal: Signs and Symptoms of Listed Potential Problems Will be Absent, Minimized or Managed (Postpartum)  Outcome: Ongoing (interventions implemented as appropriate)    Goal: Anesthesia/Sedation Recovery  Outcome: Outcome(s) achieved Date Met: 19

## 2019-07-07 NOTE — PROGRESS NOTES
VALERIE [place]  More Gaxiola  : 2000  MRN: [id number]  CSN: [id number]    Post-operative Day #4  Subjective   Her pain is well controlled. Vaginal bleeding is normal in amount. She is ambulating without difficulty. She is passing gas. She is voiding without difficulty. Her baby is still in the NICU but doing better.      Objective     Min/max vitals past 24 hours:   Temp  Min: 98.3 °F (36.8 °C)  Max: 98.9 °F (37.2 °C)  BP  Min: 119/83  Max: 134/95  Pulse  Min: 88  Max: 94  Resp  Min: 16  Max: 16        General: well developed; well nourished  no acute distress   Abdomen: soft, non-tender; no masses  no umbilical or inguinal hernias are present  no hepato-splenomegaly  incision is clean, dry, intact and without drainage   Pelvic: Not performed   Ext: Calves NT     Results from last 7 days   Lab Units 19  0609 19  1047   WBC 10*3/mm3  --  7.71   HEMOGLOBIN g/dL 10.4* 11.3*   HEMATOCRIT % 33.4* 36.1   PLATELETS 10*3/mm3  --  255     Lab Results   Component Value Date    RH Positive 2019    HEPBSAG Non-Reactive 2018        Assessment   1. POD #4 S/P Primary  (LTCS)  - 2 layer closure  2. Doing well     Plan   1. Discharge to home  2. Advised no tampons or intercourse for 6 weeks.  3. D/C questions all answered  4. Follow-up appointment in 2 week(s)  5. Explained need for oral iron for 3 months    Eli Rodriguez MD  2019  10:17 AM

## 2019-07-07 NOTE — DISCHARGE SUMMARY
Discharge Summary     Sin Gaxiola  : 2000  MRN: [id number]  CSN: [id number]    Date of Admission: 2019   Date of Discharge:  2019   Delivering Physician: Everardo South MD       Admission Diagnosis: 1. Pregnancy at 38w0d  2. Gestational hypertension     Discharge Diagnosis: 1. Same as above plus  2. Pregnancy at 38w0d - delivered  3. Failure to progress: arrest of dilation       Procedures: 1. Primary  (LTCS)  - 2 layer closure     Hospital Course  See the completed operative report for details regarding her delivery.    Her post-operative course was unremarkable.  On POD # 4 she felt like she ready ready for D/C.  She was evaluated by Dr. Rodriguez who agreed she was able to be discharged to home.  She had no febrile morbidity. She had normal bowel and bladder function and was hemodynamically stable.  Her wound was healing well without obvious signs of infections.    Infant  male  fetus weighing 3360 g (7 lb 6.5 oz)   Apgars -  4  @ 1 minute /  9  @ 5 minutes.    Discharge labs  Lab Results   Component Value Date    WBC 7.71 2019    HGB 10.4 (L) 2019    HCT 33.4 (L) 2019     2019       Discharge Medications     Discharge Medications      New Medications      Instructions Start Date   docusate sodium 100 MG capsule   100 mg, Oral, 2 Times Daily PRN      ferrous sulfate 325 (65 FE) MG tablet   325 mg, Oral, 2 Times Daily Before Meals, To help build back up your blood count and iron      ibuprofen 600 MG tablet  Commonly known as:  ADVIL,MOTRIN   600 mg, Oral, Every 6 Hours PRN      oxyCODONE-acetaminophen 5-325 MG per tablet  Commonly known as:  PERCOCET   1 tablet, Oral, Every 4 Hours PRN         Continue These Medications      Instructions Start Date   Prenatal Vitamin 27-0.8 MG tablet   1 tablet, Oral, Daily      TUMS PO   Oral             Discharge Disposition Home or Self Care   Condition on Discharge: good   Follow-up: 2  weeks with Dr. Jennifer Rodriguez MD  7/7/2019

## 2019-07-08 NOTE — PAYOR COMM NOTE
"Barry Bassett (19 y.o. Female)   Auth# [id number]  Discharge Notification    Date of Birth Social Security Number Address Home Phone MRN    2000  [address] [place] [phone] [id number]    Church Marital Status          None Single       Admission Date Admission Type Admitting Provider Attending Provider Department, Room/Bed    19 Elective Eli Rodriguez MD  [facility] MOTHER BABY 4B, N428/1    Discharge Date Discharge Disposition Discharge Destination        2019 Home or Self Care              Attending Provider:  (none)   Allergies:  No Known Allergies    Isolation:  None   Infection:  None   Code Status:  Prior    Ht:  154.9 cm (61\")   Wt:  85.7 kg (189 lb)    Admission Cmt:  None   Principal Problem:  Postpartum care following LTCS 7/3/19 - John [Z39.2]                 Active Insurance as of 2019     Primary Coverage     Payor Plan Insurance Group Employer/Plan Group    [employer] [place] [employer] [place]      Payor Plan Address Payor Plan Phone Number Payor Plan Fax Number Effective Dates    [address] [address] [phone]  2018 - None Entered    [facility] [id number]       Subscriber Name Subscriber Birth Date Member ID       BARRY BASSETT 2000 [id number]                 Emergency Contacts      (Rel.) Home Phone Work Phone Mobile Phone    Yazmin Charles (Mother) [phone] -- --    bassem slade (Significant Other) -- -- [phone]                 Discharge Summary      Eli Rodriguez MD at 2019 10:19 AM          Discharge Summary    [facility]   Barry Bassett  : 2000  MRN: [id number]  CSN: [id number]    Date of Admission: 2019   Date of Discharge:  2019   Delivering Physician: Everardo South MD       Admission Diagnosis: 1. Pregnancy at 38w0d  2. Gestational hypertension     Discharge Diagnosis: 1. Same as above plus  2. Pregnancy at 38w0d - " delivered  3. Failure to progress: arrest of dilation       Procedures: 1. Primary  (LTCS)  - 2 layer closure     Hospital Course  See the completed operative report for details regarding her delivery.    Her post-operative course was unremarkable.  On POD # 4 she felt like she ready ready for D/C.  She was evaluated by Dr. Rodriguez who agreed she was able to be discharged to home.  She had no febrile morbidity. She had normal bowel and bladder function and was hemodynamically stable.  Her wound was healing well without obvious signs of infections.    Infant  male  fetus weighing 3360 g (7 lb 6.5 oz)   Apgars -  4  @ 1 minute /  9  @ 5 minutes.    Discharge labs  Lab Results   Component Value Date    WBC 7.71 2019    HGB 10.4 (L) 2019    HCT 33.4 (L) 2019     2019       Discharge Medications     Discharge Medications      New Medications      Instructions Start Date   docusate sodium 100 MG capsule   100 mg, Oral, 2 Times Daily PRN      ferrous sulfate 325 (65 FE) MG tablet   325 mg, Oral, 2 Times Daily Before Meals, To help build back up your blood count and iron      ibuprofen 600 MG tablet  Commonly known as:  ADVIL,MOTRIN   600 mg, Oral, Every 6 Hours PRN      oxyCODONE-acetaminophen 5-325 MG per tablet  Commonly known as:  PERCOCET   1 tablet, Oral, Every 4 Hours PRN         Continue These Medications      Instructions Start Date   Prenatal Vitamin 27-0.8 MG tablet   1 tablet, Oral, Daily      TUMS PO   Oral             Discharge Disposition Home or Self Care   Condition on Discharge: good   Follow-up: 2 weeks with Dr. Jennifer Rodriguez MD  2019      Electronically signed by Eli Rodriguez MD at 2019 10:20 AM

## 2019-07-23 ENCOUNTER — POSTPARTUM VISIT (OUTPATIENT)
Dept: OBSTETRICS AND GYNECOLOGY | Facility: CLINIC | Age: 19
End: 2019-07-23

## 2019-07-23 VITALS
BODY MASS INDEX: 29.6 KG/M2 | WEIGHT: 156.8 LBS | SYSTOLIC BLOOD PRESSURE: 112 MMHG | DIASTOLIC BLOOD PRESSURE: 88 MMHG | HEIGHT: 61 IN

## 2019-07-23 PROCEDURE — 99024 POSTOP FOLLOW-UP VISIT: CPT | Performed by: OBSTETRICS & GYNECOLOGY

## 2019-07-23 NOTE — PROGRESS NOTES
"Subjective   Chief Complaint   Patient presents with   • Postpartum Care     2w6d PP, csection, baby boy, bottle feeding, pt states no c/o     More Gaxiola is a 19 y.o. year old  presenting to be seen for her postpartum visit.  She had a Primary  (LTCS).  Her son is doing well.    Since delivery she has not been sexually active.  She does not have concerns about post-partum blues/depression.   [place] Score = 1  She is bottle feeding.  For ongoing contraception, her plans are undecided.    The following portions of the patient's history were reviewed and updated as appropriate:current medications and allergies    Social History    Tobacco Use      Smoking status: Former Smoker        Types: Cigarettes        Start date: 10/2018      Smokeless tobacco: Never Used      Review of Systems  Constitutional POS: nothing reported    NEG: anorexia or night sweats   Genitourinary POS: nothing reported    NEG: dysuria or hematuria      Gastointestinal POS: nothing reported    NEG: bloating, change in bowel habits, melena or reflux symptoms   Breast POS: nothing reported    NEG: persistent breast lump, skin dimpling or nipple discharge        Objective   /88   Ht 154.9 cm (61\")   Wt 71.1 kg (156 lb 12.8 oz)   LMP 10/10/2018   Breastfeeding? No   BMI 29.63 kg/m²     General:  well developed; well nourished  no acute distress   Abdomen: soft, non-tender; no masses  no umbilical or inguinal hernias are present  no hepato-splenomegaly  incision is clean, dry, intact and without drainage   Pelvis: Not performed.          Assessment   1. Normal 6 week postpartum exam     Plan   1. BC options reviewed and compared today: considering options. Booklets provided for [facility]  2. The importance of keeping all planned follow-up and taking all medications as prescribed was emphasized.  3. Follow up for postpartum visit in 3 weeks.     No orders of the defined types were placed in this encounter.     "     This note was electronically signed.    Eli Rodriguez MD  July 23, 2019    Note: Speech recognition transcription software may have been used to create portions of this document.  An attempt at proofreading has been made but errors in transcription could still be present.

## 2019-08-10 PROBLEM — Z01.419 WELL WOMAN EXAM: Status: ACTIVE | Noted: 2019-08-10

## 2020-02-18 ENCOUNTER — LAB (OUTPATIENT)
Dept: LAB | Facility: HOSPITAL | Age: 20
End: 2020-02-18

## 2020-02-18 ENCOUNTER — OFFICE VISIT (OUTPATIENT)
Dept: OBSTETRICS AND GYNECOLOGY | Facility: CLINIC | Age: 20
End: 2020-02-18

## 2020-02-18 VITALS
HEIGHT: 61 IN | DIASTOLIC BLOOD PRESSURE: 82 MMHG | WEIGHT: 184.4 LBS | SYSTOLIC BLOOD PRESSURE: 104 MMHG | BODY MASS INDEX: 34.81 KG/M2

## 2020-02-18 DIAGNOSIS — N91.1 SECONDARY AMENORRHEA: ICD-10-CM

## 2020-02-18 DIAGNOSIS — N91.1 SECONDARY AMENORRHEA: Primary | ICD-10-CM

## 2020-02-18 DIAGNOSIS — N93.9 ABNORMAL UTERINE BLEEDING (AUB): ICD-10-CM

## 2020-02-18 LAB
B-HCG UR QL: NEGATIVE
DEPRECATED RDW RBC AUTO: 39.7 FL (ref 37–54)
ERYTHROCYTE [DISTWIDTH] IN BLOOD BY AUTOMATED COUNT: 13.3 % (ref 12.3–15.4)
ESTRADIOL SERPL HS-MCNC: 67 PG/ML
FSH SERPL-ACNC: 6.19 MIU/ML
HCT VFR BLD AUTO: 42.9 % (ref 34–46.6)
HGB BLD-MCNC: 14.5 G/DL (ref 12–15.9)
INTERNAL NEGATIVE CONTROL: NEGATIVE
INTERNAL POSITIVE CONTROL: POSITIVE
Lab: NORMAL
MCH RBC QN AUTO: 27.6 PG (ref 26.6–33)
MCHC RBC AUTO-ENTMCNC: 33.8 G/DL (ref 31.5–35.7)
MCV RBC AUTO: 81.6 FL (ref 79–97)
PLATELET # BLD AUTO: 365 10*3/MM3 (ref 140–450)
PMV BLD AUTO: 9.8 FL (ref 6–12)
PROLACTIN SERPL-MCNC: 8.67 NG/ML (ref 4.79–23.3)
RBC # BLD AUTO: 5.26 10*6/MM3 (ref 3.77–5.28)
TSH SERPL DL<=0.05 MIU/L-ACNC: 1.5 UIU/ML (ref 0.27–4.2)
WBC NRBC COR # BLD: 7.12 10*3/MM3 (ref 3.4–10.8)

## 2020-02-18 PROCEDURE — 99213 OFFICE O/P EST LOW 20 MIN: CPT | Performed by: OBSTETRICS & GYNECOLOGY

## 2020-02-18 PROCEDURE — 36415 COLL VENOUS BLD VENIPUNCTURE: CPT | Performed by: OBSTETRICS & GYNECOLOGY

## 2020-02-18 PROCEDURE — 81025 URINE PREGNANCY TEST: CPT | Performed by: OBSTETRICS & GYNECOLOGY

## 2020-02-18 PROCEDURE — 84146 ASSAY OF PROLACTIN: CPT | Performed by: OBSTETRICS & GYNECOLOGY

## 2020-02-18 PROCEDURE — 83001 ASSAY OF GONADOTROPIN (FSH): CPT | Performed by: OBSTETRICS & GYNECOLOGY

## 2020-02-18 PROCEDURE — 84443 ASSAY THYROID STIM HORMONE: CPT

## 2020-02-18 PROCEDURE — 85027 COMPLETE CBC AUTOMATED: CPT

## 2020-02-18 PROCEDURE — 82670 ASSAY OF TOTAL ESTRADIOL: CPT | Performed by: OBSTETRICS & GYNECOLOGY

## 2020-02-18 NOTE — PROGRESS NOTES
"Subjective   Chief Complaint   Patient presents with   • Follow-up     pt c/o irregular periods and postpartum depression.     More Gaxiola is a 19 y.o. year old .  Patient's last menstrual period was 2019 (within weeks).  She presents to be seen because of irregular menses.     Menarche around 12-13 years old.   Before she was pregnant she had regular periods.   Patient's last menstrual period was 2019 (within weeks). \"Before thanksgi\". That period lasted about 4-5 days.   She has done pregnancy tests at home and they have been negative.   Reports after delivery this past summer she had a regular period but then had some irregular cycles prior to November.   She denies any issues with depression.     OTHER THINGS SHE WANTS TO DISCUSS TODAY:  Nothing else    The following portions of the patient's history were reviewed and updated as appropriate:current medications and allergies    Social History    Tobacco Use      Smoking status: Current Every Day Smoker        Types: Cigarettes      Smokeless tobacco: Never Used    Review of Systems  Constitutional POS: nothing reported    NEG: anorexia or night sweats   Genitourinary POS: nothing reported    NEG: dysuria or hematuria   Gastointestinal POS: nothing reported    NEG: bloating, change in bowel habits, melena or reflux symptoms   Integument POS: nothing reported    NEG: moles that are changing in size, shape, color or rashes   Breast POS: nothing reported    NEG: persistent breast lump, skin dimpling or nipple discharge         Objective   /82   Ht 154.9 cm (61\")   Wt 83.6 kg (184 lb 6.4 oz)   LMP 2019 (Within Weeks)   Breastfeeding No   BMI 34.84 kg/m²     General:  well developed; well nourished  no acute distress   Skin:  Not performed.   Thyroid: normal to inspection and palpation   Lungs:  breathing is unlabored   Heart:  Not performed.   Breasts:  Not performed.   Abdomen: Not performed.   Pelvis: Clinical " staff was present for exam  External genitalia:  normal appearance of the external genitalia including Bartholin's and [place]'s glands.  :  urethral meatus normal;  Vaginal:  normal pink mucosa without prolapse or lesions.  Cervix:  normal appearance.  Uterus:  normal size, shape and consistency.  Adnexa:  normal bimanual exam of the adnexa.  Rectal:  digital rectal exam not performed; anus visually normal appearing.     Lab Review   No data reviewed    Imaging   No data reviewed        Assessment   1. Secondary amenorrhea     Plan   1. UPT today   The following tests were ordered today: CBC w/o differential, FSH, PRL, TSH and estradiol.  It was explained to More that all lab test should be back within the one week after they are performed. She will be notified about the results, regardless of the findings. If she has not been contacted by the office within 2 weeks after the test has been performed, it is her responsibility to contact us to learn about her results.  Reviewed if above labs are normal then will proceed with progesterone withdrawal test with provera 10mg x10 days.   Reviewed options for contraception and cycle control but she declines.   The importance of keeping all planned follow-up and taking all medications as prescribed was emphasized.  Follow up for annual exam in one month and follow up of above.     No orders of the defined types were placed in this encounter.         This note was electronically signed.    Eli Rodriguez MD  February 18, 2020    Note: Speech recognition transcription software may have been used to create portions of this document.  An attempt at proofreading has been made but errors in transcription could still be present.

## 2020-02-19 ENCOUNTER — TELEPHONE (OUTPATIENT)
Dept: OBSTETRICS AND GYNECOLOGY | Facility: CLINIC | Age: 20
End: 2020-02-19

## 2020-02-19 RX ORDER — MEDROXYPROGESTERONE ACETATE 10 MG/1
10 TABLET ORAL DAILY
Qty: 10 TABLET | Refills: 0 | Status: SHIPPED | OUTPATIENT
Start: 2020-02-19 | End: 2020-02-29

## 2020-02-19 NOTE — TELEPHONE ENCOUNTER
Patient can be informed I reviewed her labs and blood count, thyroid hormone, prolactin, follicle stimulating hormone (FSH), estradiol were all normal and urine pregnancy test was negative yesterday. As we discussed in the office, given these are all normal we will proceed with a progesterone withdrawal test to hopefully make her have a period. Provera 10 mg x 10 days sent to pharmacy. She should be informed she should have the start of bleeding within 7-10 days of taking the last pill. If she does not then she should let us know.     Thanks, Eli Rodriguez MD     New Medications Ordered This Visit   Medications   • medroxyPROGESTERone (PROVERA) 10 MG tablet     Sig: Take 1 tablet by mouth Daily for 10 days.     Dispense:  10 tablet     Refill:  0

## 2020-05-20 ENCOUNTER — TELEPHONE (OUTPATIENT)
Dept: OBSTETRICS AND GYNECOLOGY | Facility: CLINIC | Age: 20
End: 2020-05-20

## 2020-11-24 ENCOUNTER — LAB (OUTPATIENT)
Dept: LAB | Facility: HOSPITAL | Age: 20
End: 2020-11-24

## 2020-11-24 ENCOUNTER — INITIAL PRENATAL (OUTPATIENT)
Dept: OBSTETRICS AND GYNECOLOGY | Facility: CLINIC | Age: 20
End: 2020-11-24

## 2020-11-24 ENCOUNTER — TELEPHONE (OUTPATIENT)
Dept: OBSTETRICS AND GYNECOLOGY | Facility: CLINIC | Age: 20
End: 2020-11-24

## 2020-11-24 VITALS — SYSTOLIC BLOOD PRESSURE: 106 MMHG | DIASTOLIC BLOOD PRESSURE: 76 MMHG | WEIGHT: 191.9 LBS | BODY MASS INDEX: 36.26 KG/M2

## 2020-11-24 DIAGNOSIS — Z87.59 HISTORY OF GESTATIONAL HYPERTENSION: ICD-10-CM

## 2020-11-24 DIAGNOSIS — Z98.891 HISTORY OF CESAREAN SECTION: ICD-10-CM

## 2020-11-24 DIAGNOSIS — Z34.90 PREGNANCY, UNSPECIFIED GESTATIONAL AGE: Primary | ICD-10-CM

## 2020-11-24 DIAGNOSIS — Z36.87 UNSURE OF LMP (LAST MENSTRUAL PERIOD) AS REASON FOR ULTRASOUND SCAN: ICD-10-CM

## 2020-11-24 DIAGNOSIS — Z34.90 PREGNANCY, UNSPECIFIED GESTATIONAL AGE: ICD-10-CM

## 2020-11-24 LAB
ABO GROUP BLD: NORMAL
AMPHET+METHAMPHET UR QL: NEGATIVE
AMPHETAMINES UR QL: NEGATIVE
BARBITURATES UR QL SCN: NEGATIVE
BASOPHILS # BLD AUTO: 0.1 10*3/MM3 (ref 0–0.2)
BASOPHILS NFR BLD AUTO: 1.2 % (ref 0–1.5)
BENZODIAZ UR QL SCN: NEGATIVE
BLD GP AB SCN SERPL QL: NEGATIVE
BUPRENORPHINE SERPL-MCNC: NEGATIVE NG/ML
CANNABINOIDS SERPL QL: NEGATIVE
COCAINE UR QL: NEGATIVE
DEPRECATED RDW RBC AUTO: 36.9 FL (ref 37–54)
EOSINOPHIL # BLD AUTO: 0.18 10*3/MM3 (ref 0–0.4)
EOSINOPHIL NFR BLD AUTO: 2.1 % (ref 0.3–6.2)
ERYTHROCYTE [DISTWIDTH] IN BLOOD BY AUTOMATED COUNT: 12.8 % (ref 12.3–15.4)
HBV SURFACE AG SERPL QL IA: NORMAL
HCT VFR BLD AUTO: 45.7 % (ref 34–46.6)
HCV AB SER DONR QL: NORMAL
HGB BLD-MCNC: 15.1 G/DL (ref 12–15.9)
HIV1+2 AB SER QL: NORMAL
IMM GRANULOCYTES # BLD AUTO: 0.03 10*3/MM3 (ref 0–0.05)
IMM GRANULOCYTES NFR BLD AUTO: 0.3 % (ref 0–0.5)
LYMPHOCYTES # BLD AUTO: 2.33 10*3/MM3 (ref 0.7–3.1)
LYMPHOCYTES NFR BLD AUTO: 27.1 % (ref 19.6–45.3)
MCH RBC QN AUTO: 26.7 PG (ref 26.6–33)
MCHC RBC AUTO-ENTMCNC: 33 G/DL (ref 31.5–35.7)
MCV RBC AUTO: 80.7 FL (ref 79–97)
METHADONE UR QL SCN: NEGATIVE
MONOCYTES # BLD AUTO: 0.62 10*3/MM3 (ref 0.1–0.9)
MONOCYTES NFR BLD AUTO: 7.2 % (ref 5–12)
NEUTROPHILS NFR BLD AUTO: 5.35 10*3/MM3 (ref 1.7–7)
NEUTROPHILS NFR BLD AUTO: 62.1 % (ref 42.7–76)
NRBC BLD AUTO-RTO: 0 /100 WBC (ref 0–0.2)
OPIATES UR QL: NEGATIVE
OXYCODONE UR QL SCN: NEGATIVE
PCP UR QL SCN: NEGATIVE
PLATELET # BLD AUTO: 378 10*3/MM3 (ref 140–450)
PMV BLD AUTO: 9.8 FL (ref 6–12)
PROPOXYPH UR QL: NEGATIVE
RBC # BLD AUTO: 5.66 10*6/MM3 (ref 3.77–5.28)
RH BLD: POSITIVE
RPR SER QL: NORMAL
RUBV IGG SERPL IA-ACNC: POSITIVE
TRICYCLICS UR QL SCN: NEGATIVE
WBC # BLD AUTO: 8.61 10*3/MM3 (ref 3.4–10.8)

## 2020-11-24 PROCEDURE — 80306 DRUG TEST PRSMV INSTRMNT: CPT | Performed by: OBSTETRICS & GYNECOLOGY

## 2020-11-24 PROCEDURE — 86803 HEPATITIS C AB TEST: CPT

## 2020-11-24 PROCEDURE — 99214 OFFICE O/P EST MOD 30 MIN: CPT | Performed by: OBSTETRICS & GYNECOLOGY

## 2020-11-24 PROCEDURE — 80081 OBSTETRIC PANEL INC HIV TSTG: CPT

## 2020-11-24 PROCEDURE — 87086 URINE CULTURE/COLONY COUNT: CPT | Performed by: OBSTETRICS & GYNECOLOGY

## 2020-11-24 RX ORDER — PNV NO.95/FERROUS FUM/FOLIC AC 28MG-0.8MG
1 TABLET ORAL DAILY
COMMUNITY
End: 2021-07-09

## 2020-11-24 NOTE — TELEPHONE ENCOUNTER
Please inform patient that her final ultrasound report demonstrated a normal pregnancy with due date of June 4, 2021 with a normal heartbeat.  If she has any further questions or concerns I am happy to talk with her.    Eli Rodriguez MD

## 2020-11-24 NOTE — PROGRESS NOTES
Subjective   Chief Complaint   Patient presents with   • Initial Prenatal Visit     New ob. LMP per pt end of Aug.        More Gaxiola is a 20 y.o. year old .  No LMP recorded. Patient is pregnant.  She presents to be seen to initiate prenatal care.  Plan pregnancy but unsure of exact LMP date.  She reports no vaginal bleeding or significant abdominal pain.  Reports slight nausea and vomiting but denies medication assistance.    Social History    Tobacco Use      Smoking status: Former Smoker        Types: Cigarettes      Smokeless tobacco: Never Used      The following portions of the patient's history were reviewed and updated as appropriate:vital signs, allergies, current medications, past family history, past medical history, past social history, past surgical history and problem list.    Objective    Vitals:    20 1017   BP: 106/76       General: well developed; well nourished  no acute distress   Skin: No suspicious lesions seen   Thyroid: normal to inspection and palpation   Heart:  Not performed.   Lungs: breathing is unlabored   Breasts:  Examined in supine position  Symmetric without masses or skin dimpling  Nipples normal without inversion, lesions or discharge  There are no palpable axillary nodes   Abdomen: soft, non-tender; no masses  no umbilical or inguinal hernias are present  no hepato-splenomegaly   Pelvis: Clinical staff was present for exam  External genitalia:  normal appearance of the external genitalia including Bartholin's and [place]'s glands.  :  urethral meatus normal;  Vaginal:  normal pink mucosa without prolapse or lesions.  Cervix:  normal appearance.  Uterus:  10-14 week size?  Adnexa:  normal bimanual exam of the adnexa.  Rectal:  digital rectal exam not performed; anus visually normal appearing.       Lab Review   No data reviewed    Imaging   No data reviewed    Assessment/Plan   ASSESSMENT  1. 20 y.o. year old  at unknown GA due to unsure  LMP  2. Supervision of low risk pregnancy   3. History of  section   4. History of GHTN    PLAN  1. The problem list for pregnancy was initiated today  2. Tests ordered today:  Orders Placed This Encounter   Procedures   • Chlamydia trachomatis, Neisseria gonorrhoeae, Trichomonas vaginalis, PCR - Swab, Cervix     Standing Status:   Future     Standing Expiration Date:   2020   • Urine Culture - Urine, Urine, Clean Catch   • US Ob Transvaginal     Standing Status:   Future     Standing Expiration Date:   2021     Order Specific Question:   Reason for Exam:     Answer:   establish EDC, unsure LMP   • Urine Drug Screen - Urine, Clean Catch     Please confirm all positive UDS   • OB Panel With HIV     Standing Status:   Future     Standing Expiration Date:   2021   • Hepatitis C Antibody     Standing Status:   Future     Standing Expiration Date:   2021     3. Testing for GC / Chlamydia / trichomonas was done today   4. Pap was done today.  If she does not receive the results of the Pap within 2 weeks  time, she was instructed to call to find out the results.  I explained to More that the recommendations for Pap smear interval in a low risk patient's has lengthened to 3 years time.  I encouraged her to be seen yearly for a full physical exam including breast and pelvic exam even during the off years when PAP's will not be performed.  5. Genetic testing reviewed: will discuss once dating confirmed  6. Information reviewed: exercise in pregnancy, nutrition in pregnancy, weight gain in pregnancy, work and travel restrictions during pregnancy, list of OTC medications acceptable in pregnancy, call coverage groups and THC in pregnancy     Total time spent today with More  was 25 minutes (level 4).  Of this time, > 50% was spent face-to-face time coordinating care, answering her questions and counseling regarding pathophysiology of her presenting problem along with plans for any diagnositc  work-up and treatment.    Follow up: 2 week(s)       This note was electronically signed.    Eli Rodriguez MD  November 24, 2020

## 2020-11-25 LAB — BACTERIA SPEC AEROBE CULT: NO GROWTH

## 2020-12-09 ENCOUNTER — ROUTINE PRENATAL (OUTPATIENT)
Dept: OBSTETRICS AND GYNECOLOGY | Facility: CLINIC | Age: 20
End: 2020-12-09

## 2020-12-09 VITALS — DIASTOLIC BLOOD PRESSURE: 76 MMHG | BODY MASS INDEX: 36.39 KG/M2 | WEIGHT: 192.6 LBS | SYSTOLIC BLOOD PRESSURE: 118 MMHG

## 2020-12-09 DIAGNOSIS — Z98.891 HISTORY OF CESAREAN SECTION: ICD-10-CM

## 2020-12-09 DIAGNOSIS — Z87.59 HISTORY OF GESTATIONAL HYPERTENSION: ICD-10-CM

## 2020-12-09 DIAGNOSIS — Z3A.14 14 WEEKS GESTATION OF PREGNANCY: Primary | ICD-10-CM

## 2020-12-09 PROCEDURE — 99213 OFFICE O/P EST LOW 20 MIN: CPT | Performed by: OBSTETRICS & GYNECOLOGY

## 2020-12-09 RX ORDER — ASPIRIN 81 MG/1
81 TABLET ORAL DAILY
Qty: 30 TABLET | Refills: 11 | Status: SHIPPED | OUTPATIENT
Start: 2020-12-09 | End: 2021-05-30 | Stop reason: HOSPADM

## 2020-12-09 NOTE — PROGRESS NOTES
Chief Complaint   Patient presents with   • Routine Prenatal Visit     no c/o.        HPI: More is a  currently at 14w5d who today reports the following:  Nausea - No; Vaginal bleeding -  No; Heartburn - No.    ROS:  GI: Constipation - No; Diarrhea - No    Neuro: Headache - No; Visual change - No      EXAM:  Vitals: See prenatal flowsheet   Abdomen: See prenatal flowsheet   Urine glucose/protein: See prenatal flowsheet   Pelvic: See prenatal flowsheet     Prenatal Labs  Lab Results   Component Value Date    HGB 15.1 2020    RUBELLAABIGG Positive 2020    HEPBSAG Non-Reactive 2020    ABSCRN Negative 2020    SAI6QZY0 Non-Reactive 2020    HEPCVIRUSABY Non-Reactive 2020     05/15/2019    GGTFASTING 84 2019    CJP4QODP 167 2019    LWV0BFXD 157 (H) 2019    BEQ3QENE 103 2019    STREPGPB pos 2019    URINECX No growth 2020       MDM:  Impression: 1. Supervision of low risk pregnancy   2. History of  section   3. History of GHTN   Tests done today: 1. none   Topics discussed: 1. Continue with PNV's  2. Prenatal labs reviewed  3. Declines genetic screening   4. Start bASA   Tests scheduled today for her next visit:   none

## 2021-01-06 ENCOUNTER — ROUTINE PRENATAL (OUTPATIENT)
Dept: OBSTETRICS AND GYNECOLOGY | Facility: CLINIC | Age: 21
End: 2021-01-06

## 2021-01-06 VITALS — BODY MASS INDEX: 36.69 KG/M2 | DIASTOLIC BLOOD PRESSURE: 80 MMHG | SYSTOLIC BLOOD PRESSURE: 132 MMHG | WEIGHT: 194.2 LBS

## 2021-01-06 DIAGNOSIS — Z87.59 HISTORY OF GESTATIONAL HYPERTENSION: ICD-10-CM

## 2021-01-06 DIAGNOSIS — Z3A.18 18 WEEKS GESTATION OF PREGNANCY: Primary | ICD-10-CM

## 2021-01-06 DIAGNOSIS — Z98.891 HISTORY OF CESAREAN SECTION: ICD-10-CM

## 2021-01-06 PROCEDURE — 90686 IIV4 VACC NO PRSV 0.5 ML IM: CPT | Performed by: OBSTETRICS & GYNECOLOGY

## 2021-01-06 PROCEDURE — 90471 IMMUNIZATION ADMIN: CPT | Performed by: OBSTETRICS & GYNECOLOGY

## 2021-01-06 PROCEDURE — 99213 OFFICE O/P EST LOW 20 MIN: CPT | Performed by: OBSTETRICS & GYNECOLOGY

## 2021-01-06 NOTE — PROGRESS NOTES
Chief Complaint   Patient presents with   • Routine Prenatal Visit     no c/o       HPI: More is a  currently at 18w5d who today reports the following:  Contractions - No; Leaking - No; Vaginal bleeding -  No; Heartburn - No.    ROS:  GI: Nausea - No ; Constipation - No; Diarrhea - No    Neuro: Headache - No ; Visual change - No      EXAM:  Vitals: See prenatal flowsheet   Abdomen: See prenatal flowsheet   Urine glucose/protein: See prenatal flowsheet   Pelvic: See prenatal flowsheet     Lab Results   Component Value Date    ABO O 2020    RH Positive 2020    ABSCRN Negative 2020       MDM:  Impression: 1. Supervision of low risk pregnancy  2. Previous C/S with route of delivery to be determined   Tests done today: 1. Flu vaccine   Topics discussed: 1. Continue with PNV's  2. Prenatal labs reviewed  3. Informed patient of my current pregnancy, due date and availability of my partners in my future absence. All questions answered.      Tests scheduled today for her next visit:   U/S for anatomic screening

## 2021-01-20 ENCOUNTER — ROUTINE PRENATAL (OUTPATIENT)
Dept: OBSTETRICS AND GYNECOLOGY | Facility: CLINIC | Age: 21
End: 2021-01-20

## 2021-01-20 VITALS — SYSTOLIC BLOOD PRESSURE: 112 MMHG | WEIGHT: 192.2 LBS | DIASTOLIC BLOOD PRESSURE: 84 MMHG | BODY MASS INDEX: 36.32 KG/M2

## 2021-01-20 DIAGNOSIS — Z98.891 HISTORY OF CESAREAN SECTION: ICD-10-CM

## 2021-01-20 DIAGNOSIS — Z3A.20 20 WEEKS GESTATION OF PREGNANCY: Primary | ICD-10-CM

## 2021-01-20 PROCEDURE — 99213 OFFICE O/P EST LOW 20 MIN: CPT | Performed by: OBSTETRICS & GYNECOLOGY

## 2021-01-20 NOTE — PROGRESS NOTES
Chief Complaint   Patient presents with   • Routine Prenatal Visit     US today. no c/o pt does not want to know the gender       HPI: More is a  currently at 20w5d who today reports the following:  Contractions - No; Leaking - No; Vaginal bleeding -  No; Heartburn - No.  After reading about TOLAC she just wants to schedule a repeat c/s    ROS:  GI: Nausea - No ; Constipation - No; Diarrhea - No    Neuro: Headache - No ; Visual change - No      EXAM:  Vitals: See prenatal flowsheet   Abdomen: See prenatal flowsheet   Urine glucose/protein: See prenatal flowsheet   Pelvic: See prenatal flowsheet     Lab Results   Component Value Date    ABO O 2020    RH Positive 2020    ABSCRN Negative 2020       MDM:  Impression: 1. Supervision of low risk pregnancy  2. Previous C/S with planned repeat C/S   Tests done today: 1. U/S for anatomic screening - anatomy was not completely seen today   Topics discussed: 1. Continue with PNV's  2. Prenatal labs reviewed  3. Schedule repeat c/s at 39 weeks prior to my maternity leave   Tests scheduled today for her next visit:   U/S to complete the anatomic screening

## 2021-02-17 ENCOUNTER — ROUTINE PRENATAL (OUTPATIENT)
Dept: OBSTETRICS AND GYNECOLOGY | Facility: CLINIC | Age: 21
End: 2021-02-17

## 2021-02-17 VITALS — BODY MASS INDEX: 36.77 KG/M2 | DIASTOLIC BLOOD PRESSURE: 88 MMHG | SYSTOLIC BLOOD PRESSURE: 124 MMHG | WEIGHT: 194.6 LBS

## 2021-02-17 DIAGNOSIS — Z98.891 HISTORY OF CESAREAN SECTION: ICD-10-CM

## 2021-02-17 DIAGNOSIS — Z3A.24 24 WEEKS GESTATION OF PREGNANCY: Primary | ICD-10-CM

## 2021-02-17 PROCEDURE — 99213 OFFICE O/P EST LOW 20 MIN: CPT | Performed by: OBSTETRICS & GYNECOLOGY

## 2021-02-17 NOTE — PROGRESS NOTES
Chief Complaint   Patient presents with   • Routine Prenatal Visit     US today. no c/o       HPI: More is a  currently at 24w5d who today reports the following:  Contractions - No; Leaking - No; Vaginal bleeding -  No; Heartburn - No.    ROS:  GI: Nausea - No ; Constipation - No; Diarrhea - No    Neuro: Headache - No ; Visual change - No      EXAM:  Vitals: See prenatal flowsheet   Abdomen: See prenatal flowsheet   Urine glucose/protein: See prenatal flowsheet   Pelvic: See prenatal flowsheet     Lab Results   Component Value Date    ABO O 2020    RH Positive 2020    ABSCRN Negative 2020       MDM:  Impression: 1. Supervision of low risk pregnancy  2. Previous C/S with planned repeat C/S   Tests done today: 1. U/S to complete the anatomic screening - anatomy completely seen today   Topics discussed: 1. Continue with PNV's  2. Prenatal labs reviewed  3. Fetal kick counts   Tests scheduled today for her next visit:   GCT, CBC, Tdap

## 2021-03-10 ENCOUNTER — LAB (OUTPATIENT)
Dept: LAB | Facility: HOSPITAL | Age: 21
End: 2021-03-10

## 2021-03-10 ENCOUNTER — ROUTINE PRENATAL (OUTPATIENT)
Dept: OBSTETRICS AND GYNECOLOGY | Facility: CLINIC | Age: 21
End: 2021-03-10

## 2021-03-10 VITALS — SYSTOLIC BLOOD PRESSURE: 116 MMHG | DIASTOLIC BLOOD PRESSURE: 80 MMHG | BODY MASS INDEX: 36.84 KG/M2 | WEIGHT: 195 LBS

## 2021-03-10 DIAGNOSIS — Z36.89 NST (NON-STRESS TEST) REACTIVE: ICD-10-CM

## 2021-03-10 DIAGNOSIS — O36.8120 DECREASED FETAL MOVEMENTS IN SECOND TRIMESTER, SINGLE OR UNSPECIFIED FETUS: ICD-10-CM

## 2021-03-10 DIAGNOSIS — Z3A.24 24 WEEKS GESTATION OF PREGNANCY: ICD-10-CM

## 2021-03-10 DIAGNOSIS — Z98.891 HISTORY OF CESAREAN SECTION: ICD-10-CM

## 2021-03-10 DIAGNOSIS — Z3A.27 27 WEEKS GESTATION OF PREGNANCY: Primary | ICD-10-CM

## 2021-03-10 DIAGNOSIS — Z87.59 HISTORY OF GESTATIONAL HYPERTENSION: ICD-10-CM

## 2021-03-10 LAB
DEPRECATED RDW RBC AUTO: 38.6 FL (ref 37–54)
ERYTHROCYTE [DISTWIDTH] IN BLOOD BY AUTOMATED COUNT: 13.1 % (ref 12.3–15.4)
GLUCOSE 1H P 100 G GLC PO SERPL-MCNC: 126 MG/DL (ref 65–140)
HCT VFR BLD AUTO: 38.2 % (ref 34–46.6)
HGB BLD-MCNC: 12.6 G/DL (ref 12–15.9)
MCH RBC QN AUTO: 26.8 PG (ref 26.6–33)
MCHC RBC AUTO-ENTMCNC: 33 G/DL (ref 31.5–35.7)
MCV RBC AUTO: 81.3 FL (ref 79–97)
PLATELET # BLD AUTO: 317 10*3/MM3 (ref 140–450)
PMV BLD AUTO: 10.8 FL (ref 6–12)
RBC # BLD AUTO: 4.7 10*6/MM3 (ref 3.77–5.28)
WBC # BLD AUTO: 13.31 10*3/MM3 (ref 3.4–10.8)

## 2021-03-10 PROCEDURE — 82950 GLUCOSE TEST: CPT

## 2021-03-10 PROCEDURE — 99213 OFFICE O/P EST LOW 20 MIN: CPT | Performed by: OBSTETRICS & GYNECOLOGY

## 2021-03-10 PROCEDURE — 85027 COMPLETE CBC AUTOMATED: CPT

## 2021-03-10 PROCEDURE — 59025 FETAL NON-STRESS TEST: CPT | Performed by: OBSTETRICS & GYNECOLOGY

## 2021-03-10 NOTE — PROGRESS NOTES
Chief Complaint   Patient presents with   • Routine Prenatal Visit     1hr GTT today. needs to be drawn at 2:02. pt states that she feels like she is not feeling baby as much but states that she does feel him daily.       HPI: More is a  currently at 27w5d who today reports the following:  Contractions - No; Leaking - No; Vaginal bleeding -  No; Heartburn - No.    ROS:  GI: Nausea - No ; Constipation - No; Diarrhea - No    Neuro: Headache - No ; Visual change - No      EXAM:  Vitals: See prenatal flowsheet   Abdomen: See prenatal flowsheet   Urine glucose/protein: See prenatal flowsheet   Pelvic: See prenatal flowsheet     Lab Results   Component Value Date    ABO O 2020    RH Positive 2020    ABSCRN Negative 2020       MDM:  Impression: 1. Supervision of low risk pregnancy   2. History of c/s with plan for repeat c/s   3. Decreased fetal movement    Tests done today: 1. GCT, cbc   2. NST - reactive    Topics discussed: 1. Continue with PNV's  2. Prenatal labs reviewed  3.  labor signs and symptoms  4. Fetal kick count instructions  . Patient feeling good Fms after NST  5. tdap  6. Reviewed normal glucola with patient    Tests scheduled today for her next visit:   none

## 2021-03-11 NOTE — NON STRESS TEST
Non Stress Test      Patient: More Gaxiola  : 2000  MRN: [id number]  CSN: [id number]  Date: 3/10/2021    Estimated Date of Delivery: 21  Gestational Age: 27w5d    Indication for NST decreased fetal movement                   Interpretation    Baseline  beats per minute   Accelerations  Present  Mod variability    Decelerations Absent       Additional Comments Reactive and reassuring        Recommendations for f/u Fetal kick count instructions        This note has been electronically signed.    Eli Rodriguez MD

## 2021-03-25 ENCOUNTER — ROUTINE PRENATAL (OUTPATIENT)
Dept: OBSTETRICS AND GYNECOLOGY | Facility: CLINIC | Age: 21
End: 2021-03-25

## 2021-03-25 VITALS — WEIGHT: 196 LBS | BODY MASS INDEX: 37.03 KG/M2 | DIASTOLIC BLOOD PRESSURE: 74 MMHG | SYSTOLIC BLOOD PRESSURE: 116 MMHG

## 2021-03-25 DIAGNOSIS — Z3A.29 29 WEEKS GESTATION OF PREGNANCY: Primary | ICD-10-CM

## 2021-03-25 DIAGNOSIS — Z98.891 HISTORY OF CESAREAN SECTION: ICD-10-CM

## 2021-03-25 PROCEDURE — 90471 IMMUNIZATION ADMIN: CPT | Performed by: OBSTETRICS & GYNECOLOGY

## 2021-03-25 PROCEDURE — 99213 OFFICE O/P EST LOW 20 MIN: CPT | Performed by: OBSTETRICS & GYNECOLOGY

## 2021-03-25 PROCEDURE — 90715 TDAP VACCINE 7 YRS/> IM: CPT | Performed by: OBSTETRICS & GYNECOLOGY

## 2021-03-25 NOTE — PROGRESS NOTES
Chief Complaint   Patient presents with   • Routine Prenatal Visit       HPI: More is a  currently at 29w6d who today reports the following:  Contractions - No; Leaking - No; Vaginal bleeding -  No; Heartburn - No.    ROS:  GI: Nausea - No ; Constipation - No; Diarrhea - No    Neuro: Headache - No ; Visual change - No      EXAM:  Vitals: See prenatal flowsheet   Abdomen: See prenatal flowsheet   Urine glucose/protein: See prenatal flowsheet   Pelvic: See prenatal flowsheet     Lab Results   Component Value Date    HGB 12.6 03/10/2021    HCT 38.2 03/10/2021     03/10/2021    ABO O 2020    RH Positive 2020    ABSCRN Negative 2020       MDM:  Impression: 1. Supervision of low risk pregnancy  2. Previous C/S with planned repeat C/S on 2021   Tests done today: 1. Tdap   Topics discussed: 1. Continue with PNV's  2. Prenatal labs reviewed  3. Circumcision  4. Pediatrician selection  5.  labor signs and symptoms  6. Symptoms of preeclampsia   Tests scheduled today for her next visit:   none

## 2021-04-08 ENCOUNTER — ROUTINE PRENATAL (OUTPATIENT)
Dept: OBSTETRICS AND GYNECOLOGY | Facility: CLINIC | Age: 21
End: 2021-04-08

## 2021-04-08 VITALS — DIASTOLIC BLOOD PRESSURE: 82 MMHG | WEIGHT: 196.2 LBS | SYSTOLIC BLOOD PRESSURE: 116 MMHG | BODY MASS INDEX: 37.07 KG/M2

## 2021-04-08 DIAGNOSIS — O34.219 HISTORY OF CESAREAN SECTION COMPLICATING PREGNANCY: ICD-10-CM

## 2021-04-08 DIAGNOSIS — Z3A.31 31 WEEKS GESTATION OF PREGNANCY: Primary | ICD-10-CM

## 2021-04-08 PROCEDURE — 99212 OFFICE O/P EST SF 10 MIN: CPT | Performed by: OBSTETRICS & GYNECOLOGY

## 2021-04-08 NOTE — PROGRESS NOTES
Chief Complaint   Patient presents with   • Routine Prenatal Visit     no c/o       HPI: More is a  currently at 31w6d who today reports the following:  Contractions - No; Leaking - No; Vaginal bleeding -  No; Swelling of extremities - No.    ROS:  GI: Nausea - No; Constipation - No; Diarrhea - No    Neuro: Headache - No; Visual change - No      EXAM:  Vitals: See prenatal flowsheet   Abdomen: See prenatal flowsheet   Urine glucose/protein: See prenatal flowsheet   Pelvic: See prenatal flowsheet   MDM:   Impression: 1. Supervision of low risk pregnancy  2. Previous C/S with planned repeat C/S on 2021 at 39 weeks    Tests done today: 1. none   Topics discussed: 1. Continue with PNV's  2. Prenatal labs reviewed  3.  labor signs and symptoms  4. Symptoms of preeclampsia   Tests scheduled today for her next visit:   none

## 2021-04-22 ENCOUNTER — ROUTINE PRENATAL (OUTPATIENT)
Dept: OBSTETRICS AND GYNECOLOGY | Facility: CLINIC | Age: 21
End: 2021-04-22

## 2021-04-22 VITALS — WEIGHT: 197.2 LBS | BODY MASS INDEX: 37.26 KG/M2 | DIASTOLIC BLOOD PRESSURE: 84 MMHG | SYSTOLIC BLOOD PRESSURE: 116 MMHG

## 2021-04-22 DIAGNOSIS — Z3A.29 29 WEEKS GESTATION OF PREGNANCY: ICD-10-CM

## 2021-04-22 DIAGNOSIS — Z87.59 HISTORY OF GESTATIONAL HYPERTENSION: ICD-10-CM

## 2021-04-22 DIAGNOSIS — Z98.891 HISTORY OF CESAREAN SECTION: ICD-10-CM

## 2021-04-22 PROCEDURE — 99213 OFFICE O/P EST LOW 20 MIN: CPT | Performed by: OBSTETRICS & GYNECOLOGY

## 2021-04-22 NOTE — PROGRESS NOTES
Chief Complaint   Patient presents with   • Routine Prenatal Visit     CHARLES pt; no c/o       HPI: More is a  currently at 33w6d who today reports the following:  Contractions - No; Leaking - No; Vaginal bleeding -  No; Swelling of extremities - No.    ROS:  GI: Nausea - No; Constipation - No; Diarrhea - No    Neuro: Headache - No; Visual change - No      EXAM:  Vitals: See prenatal flowsheet   Abdomen: See prenatal flowsheet   Urine glucose/protein: See prenatal flowsheet   Pelvic: See prenatal flowsheet   MDM:   Impression: 1. Supervision of low risk pregnancy  2. Previous C/S with planned repeat C/S on 2021 at 39 weeks   Tests done today: 1. none   Topics discussed: 1. Continue with PNV's  2. Prenatal labs reviewed  3.  labor signs and symptoms  4. fetal kick count instructions   Tests scheduled today for her next visit:   GBS testing

## 2021-05-06 ENCOUNTER — ROUTINE PRENATAL (OUTPATIENT)
Dept: OBSTETRICS AND GYNECOLOGY | Facility: CLINIC | Age: 21
End: 2021-05-06

## 2021-05-06 VITALS — WEIGHT: 200.2 LBS | BODY MASS INDEX: 37.83 KG/M2 | SYSTOLIC BLOOD PRESSURE: 106 MMHG | DIASTOLIC BLOOD PRESSURE: 80 MMHG

## 2021-05-06 DIAGNOSIS — Z98.891 HISTORY OF CESAREAN SECTION: ICD-10-CM

## 2021-05-06 DIAGNOSIS — Z3A.35 35 WEEKS GESTATION OF PREGNANCY: Primary | ICD-10-CM

## 2021-05-06 PROCEDURE — 99212 OFFICE O/P EST SF 10 MIN: CPT | Performed by: OBSTETRICS & GYNECOLOGY

## 2021-05-06 NOTE — PROGRESS NOTES
No results found for: ABORH, LABANTI, ABID    Chief Complaint   Patient presents with   • Routine Prenatal Visit     No complaints       HPI: More is a  currently at 35w6d who today reports the following: Nausea-  No; Contractions: No,   Vaginal bleeding- No; Heartburn- YES    Today More complains of heartburn  See ob flowsheet for physical exam.    Review of Systems - Negative except for heartburn    Prenatal Assessment  Fetal Heart Rate: 131  Fundal Height (cm): 36 cm  Movement: Present  Presentation: Vertex  Prenatal Vitals  BP: 106/80  Weight: 90.8 kg (200 lb 3.2 oz)  Vaginal Drainage  Draining Fluid: No  Edema  LLE Edema: None  RLE Edema: None  Facial Edema: None     Tests done today: none  At the time of the next visit, she will need to have none    Impression:   Encounter Diagnoses   Name Primary?   • 35 weeks gestation of pregnancy Yes   • History of  section        Plan: Return in 1 week    This note was electronically signed.    Ariel Benitez MD

## 2021-05-18 ENCOUNTER — ROUTINE PRENATAL (OUTPATIENT)
Dept: OBSTETRICS AND GYNECOLOGY | Facility: CLINIC | Age: 21
End: 2021-05-18

## 2021-05-18 VITALS — DIASTOLIC BLOOD PRESSURE: 82 MMHG | SYSTOLIC BLOOD PRESSURE: 130 MMHG | WEIGHT: 198.4 LBS | BODY MASS INDEX: 37.49 KG/M2

## 2021-05-18 DIAGNOSIS — Z3A.37 37 WEEKS GESTATION OF PREGNANCY: Primary | ICD-10-CM

## 2021-05-18 DIAGNOSIS — Z98.891 HISTORY OF CESAREAN SECTION: ICD-10-CM

## 2021-05-18 PROCEDURE — 99212 OFFICE O/P EST SF 10 MIN: CPT | Performed by: OBSTETRICS & GYNECOLOGY

## 2021-05-18 NOTE — PROGRESS NOTES
No results found for: ABORH, LABANTI, ABID    Chief Complaint   Patient presents with   • Routine Prenatal Visit     No complaints       HPI: More is a  currently at 37w4d who today reports the following: Nausea-  No; Contractions: No,   Vaginal bleeding- No; Heartburn- YES    Today More complains of heartburn  See ob flowsheet for physical exam.    Review of Systems - Negative except for heartburn    Prenatal Assessment  Fetal Heart Rate: 139  Fundal Height (cm): 37 cm  Movement: Present  Presentation: Vertex  Prenatal Vitals  BP: 130/82  Weight: 90 kg (198 lb 6.4 oz)  Vaginal Drainage  Draining Fluid: No  Edema  LLE Edema: None  RLE Edema: None  Facial Edema: None     Tests done today: none  At the time of the next visit, she will need to have none    Impression:   Encounter Diagnoses   Name Primary?   • 37 weeks gestation of pregnancy Yes   • History of  section        Plan: Return in 1 week    This note was electronically signed.    Ariel Benitez MD

## 2021-05-24 ENCOUNTER — PREP FOR SURGERY (OUTPATIENT)
Dept: OTHER | Facility: HOSPITAL | Age: 21
End: 2021-05-24

## 2021-05-24 ENCOUNTER — ROUTINE PRENATAL (OUTPATIENT)
Dept: OBSTETRICS AND GYNECOLOGY | Facility: CLINIC | Age: 21
End: 2021-05-24

## 2021-05-24 VITALS — BODY MASS INDEX: 37.79 KG/M2 | SYSTOLIC BLOOD PRESSURE: 130 MMHG | WEIGHT: 200 LBS | DIASTOLIC BLOOD PRESSURE: 90 MMHG

## 2021-05-24 DIAGNOSIS — O13.3 GESTATIONAL HYPERTENSION, THIRD TRIMESTER: ICD-10-CM

## 2021-05-24 DIAGNOSIS — Z98.891 PREVIOUS CESAREAN SECTION: Primary | ICD-10-CM

## 2021-05-24 DIAGNOSIS — Z98.891 HISTORY OF CESAREAN SECTION: ICD-10-CM

## 2021-05-24 DIAGNOSIS — Z3A.38 38 WEEKS GESTATION OF PREGNANCY: Primary | ICD-10-CM

## 2021-05-24 LAB
ACCELERATIONS > 10BPM: 5
ACCELERATIONS > 15 BPM: 5
ACOUSTIC STIMULATION: YES
DECELERATIONS: NORMAL
FHR VARIABILITIES: NORMAL
NST ASSESSMENT: REACTIVE
NST BASELINE: 137
NST DURATION: 27 MINUTES
NST INDICATIONS: NORMAL
NST LOCATIONS: NORMAL
NST READ BY: NORMAL
NST RETURN: NORMAL
UTERINE ACTIVITY: YES

## 2021-05-24 PROCEDURE — 99213 OFFICE O/P EST LOW 20 MIN: CPT | Performed by: OBSTETRICS & GYNECOLOGY

## 2021-05-24 PROCEDURE — 59025 FETAL NON-STRESS TEST: CPT | Performed by: OBSTETRICS & GYNECOLOGY

## 2021-05-24 RX ORDER — LIDOCAINE HYDROCHLORIDE 10 MG/ML
5 INJECTION, SOLUTION EPIDURAL; INFILTRATION; INTRACAUDAL; PERINEURAL AS NEEDED
Status: CANCELLED | OUTPATIENT
Start: 2021-05-24

## 2021-05-24 RX ORDER — PROMETHAZINE HYDROCHLORIDE 12.5 MG/1
12.5 TABLET ORAL EVERY 6 HOURS PRN
Status: CANCELLED | OUTPATIENT
Start: 2021-05-24

## 2021-05-24 RX ORDER — TRISODIUM CITRATE DIHYDRATE AND CITRIC ACID MONOHYDRATE 500; 334 MG/5ML; MG/5ML
30 SOLUTION ORAL ONCE
Status: CANCELLED | OUTPATIENT
Start: 2021-05-24 | End: 2021-05-24

## 2021-05-24 RX ORDER — SODIUM CHLORIDE 0.9 % (FLUSH) 0.9 %
3 SYRINGE (ML) INJECTION EVERY 12 HOURS SCHEDULED
Status: CANCELLED | OUTPATIENT
Start: 2021-05-24

## 2021-05-24 RX ORDER — CARBOPROST TROMETHAMINE 250 UG/ML
250 INJECTION, SOLUTION INTRAMUSCULAR AS NEEDED
Status: CANCELLED | OUTPATIENT
Start: 2021-05-24

## 2021-05-24 RX ORDER — METHYLERGONOVINE MALEATE 0.2 MG/ML
200 INJECTION INTRAVENOUS AS NEEDED
Status: CANCELLED | OUTPATIENT
Start: 2021-05-24

## 2021-05-24 RX ORDER — OXYTOCIN-SODIUM CHLORIDE 0.9% IV SOLN 30 UNIT/500ML 30-0.9/5 UT/ML-%
650 SOLUTION INTRAVENOUS ONCE
Status: CANCELLED | OUTPATIENT
Start: 2021-05-24 | End: 2021-05-24

## 2021-05-24 RX ORDER — SODIUM CHLORIDE 0.9 % (FLUSH) 0.9 %
10 SYRINGE (ML) INJECTION AS NEEDED
Status: CANCELLED | OUTPATIENT
Start: 2021-05-24

## 2021-05-24 RX ORDER — OXYTOCIN-SODIUM CHLORIDE 0.9% IV SOLN 30 UNIT/500ML 30-0.9/5 UT/ML-%
85 SOLUTION INTRAVENOUS ONCE
Status: CANCELLED | OUTPATIENT
Start: 2021-05-24 | End: 2021-05-24

## 2021-05-24 RX ORDER — SODIUM CHLORIDE, SODIUM LACTATE, POTASSIUM CHLORIDE, CALCIUM CHLORIDE 600; 310; 30; 20 MG/100ML; MG/100ML; MG/100ML; MG/100ML
125 INJECTION, SOLUTION INTRAVENOUS CONTINUOUS
Status: CANCELLED | OUTPATIENT
Start: 2021-05-24

## 2021-05-24 RX ORDER — ACETAMINOPHEN 500 MG
1000 TABLET ORAL ONCE
Status: CANCELLED | OUTPATIENT
Start: 2021-05-24 | End: 2021-05-24

## 2021-05-24 RX ORDER — KETOROLAC TROMETHAMINE 30 MG/ML
30 INJECTION, SOLUTION INTRAMUSCULAR; INTRAVENOUS ONCE
Status: CANCELLED | OUTPATIENT
Start: 2021-05-24 | End: 2021-05-24

## 2021-05-24 RX ORDER — MORPHINE SULFATE 2 MG/ML
2 INJECTION, SOLUTION INTRAMUSCULAR; INTRAVENOUS
Status: CANCELLED | OUTPATIENT
Start: 2021-05-24 | End: 2021-06-03

## 2021-05-24 RX ORDER — MISOPROSTOL 200 UG/1
800 TABLET ORAL AS NEEDED
Status: CANCELLED | OUTPATIENT
Start: 2021-05-24

## 2021-05-24 RX ORDER — PROMETHAZINE HYDROCHLORIDE 12.5 MG/1
12.5 SUPPOSITORY RECTAL EVERY 6 HOURS PRN
Status: CANCELLED | OUTPATIENT
Start: 2021-05-24

## 2021-05-24 NOTE — PROGRESS NOTES
No results found for: ABORH, LABANTI, ABID    Chief Complaint   Patient presents with   • Routine Prenatal Visit     No complaints       HPI: More is a  currently at 38w3d who today reports the following: Nausea-  No; Contractions: No,   Vaginal bleeding- No; Heartburn- YES    Today More complains of heartburn  See ob flowsheet for physical exam.    Review of Systems - Negative except for heartburn    Prenatal Assessment  Fetal Heart Rate: 141  Movement: Present  Presentation: Vertex  Prenatal Vitals  BP: 130/90  Weight: 90.7 kg (200 lb)  Vaginal Drainage  Draining Fluid: No  Edema  LLE Edema: None  RLE Edema: None  Facial Edema: None     Tests done today: NST - reactive  At the time of the next visit, she will need to have none    Impression:   Encounter Diagnoses   Name Primary?   • 38 weeks gestation of pregnancy Yes   • History of  section        Plan: NST done today was reactive, patient will have a PEP done with her lab work tomorrow and is scheduled for a repeat  section on Friday.  Patient will call for headaches or epigastric pain    This note was electronically signed.    Ariel Benitez MD

## 2021-05-25 ENCOUNTER — APPOINTMENT (OUTPATIENT)
Dept: PREADMISSION TESTING | Facility: HOSPITAL | Age: 21
End: 2021-05-25

## 2021-05-25 ENCOUNTER — PRE-ADMISSION TESTING (OUTPATIENT)
Dept: PREADMISSION TESTING | Facility: HOSPITAL | Age: 21
End: 2021-05-25

## 2021-05-25 VITALS — BODY MASS INDEX: 35.7 KG/M2 | HEIGHT: 62 IN | WEIGHT: 194 LBS

## 2021-05-25 PROBLEM — Z3A.20 20 WEEKS GESTATION OF PREGNANCY: Status: ACTIVE | Noted: 2021-01-20

## 2021-05-25 LAB
ABO GROUP BLD: NORMAL
ALP SERPL-CCNC: 274 U/L (ref 39–117)
ALT SERPL W P-5'-P-CCNC: 10 U/L (ref 1–33)
AST SERPL-CCNC: 14 U/L (ref 1–32)
BILIRUB SERPL-MCNC: 0.3 MG/DL (ref 0–1.2)
BLD GP AB SCN SERPL QL: NEGATIVE
CREAT SERPL-MCNC: 0.68 MG/DL (ref 0.57–1)
DEPRECATED RDW RBC AUTO: 39.9 FL (ref 37–54)
ERYTHROCYTE [DISTWIDTH] IN BLOOD BY AUTOMATED COUNT: 13.9 % (ref 12.3–15.4)
HCT VFR BLD AUTO: 38.8 % (ref 34–46.6)
HGB BLD-MCNC: 12 G/DL (ref 12–15.9)
LDH SERPL-CCNC: 188 U/L (ref 135–214)
MCH RBC QN AUTO: 24.6 PG (ref 26.6–33)
MCHC RBC AUTO-ENTMCNC: 30.9 G/DL (ref 31.5–35.7)
MCV RBC AUTO: 79.5 FL (ref 79–97)
PLATELET # BLD AUTO: 257 10*3/MM3 (ref 140–450)
PMV BLD AUTO: 10.8 FL (ref 6–12)
RBC # BLD AUTO: 4.88 10*6/MM3 (ref 3.77–5.28)
RH BLD: POSITIVE
SARS-COV-2 RNA PNL SPEC NAA+PROBE: NOT DETECTED
T&S EXPIRATION DATE: NORMAL
URATE SERPL-MCNC: 6.3 MG/DL (ref 2.4–5.7)
WBC # BLD AUTO: 8.43 10*3/MM3 (ref 3.4–10.8)

## 2021-05-25 PROCEDURE — 85027 COMPLETE CBC AUTOMATED: CPT | Performed by: OBSTETRICS & GYNECOLOGY

## 2021-05-25 PROCEDURE — 84550 ASSAY OF BLOOD/URIC ACID: CPT | Performed by: OBSTETRICS & GYNECOLOGY

## 2021-05-25 PROCEDURE — 83615 LACTATE (LD) (LDH) ENZYME: CPT | Performed by: OBSTETRICS & GYNECOLOGY

## 2021-05-25 PROCEDURE — 84450 TRANSFERASE (AST) (SGOT): CPT | Performed by: OBSTETRICS & GYNECOLOGY

## 2021-05-25 PROCEDURE — 86900 BLOOD TYPING SEROLOGIC ABO: CPT | Performed by: OBSTETRICS & GYNECOLOGY

## 2021-05-25 PROCEDURE — 86901 BLOOD TYPING SEROLOGIC RH(D): CPT | Performed by: OBSTETRICS & GYNECOLOGY

## 2021-05-25 PROCEDURE — 86850 RBC ANTIBODY SCREEN: CPT | Performed by: OBSTETRICS & GYNECOLOGY

## 2021-05-25 PROCEDURE — 84460 ALANINE AMINO (ALT) (SGPT): CPT | Performed by: OBSTETRICS & GYNECOLOGY

## 2021-05-25 PROCEDURE — C9803 HOPD COVID-19 SPEC COLLECT: HCPCS

## 2021-05-25 PROCEDURE — 84075 ASSAY ALKALINE PHOSPHATASE: CPT | Performed by: OBSTETRICS & GYNECOLOGY

## 2021-05-25 PROCEDURE — U0004 COV-19 TEST NON-CDC HGH THRU: HCPCS

## 2021-05-25 PROCEDURE — 82565 ASSAY OF CREATININE: CPT | Performed by: OBSTETRICS & GYNECOLOGY

## 2021-05-25 PROCEDURE — 82247 BILIRUBIN TOTAL: CPT | Performed by: OBSTETRICS & GYNECOLOGY

## 2021-05-28 ENCOUNTER — ANESTHESIA (OUTPATIENT)
Dept: LABOR AND DELIVERY | Facility: HOSPITAL | Age: 21
End: 2021-05-28

## 2021-05-28 ENCOUNTER — HOSPITAL ENCOUNTER (INPATIENT)
Facility: HOSPITAL | Age: 21
LOS: 2 days | Discharge: HOME | End: 2021-05-30
Attending: OBSTETRICS & GYNECOLOGY | Admitting: OBSTETRICS & GYNECOLOGY

## 2021-05-28 ENCOUNTER — ANESTHESIA EVENT (OUTPATIENT)
Dept: LABOR AND DELIVERY | Facility: HOSPITAL | Age: 21
End: 2021-05-28

## 2021-05-28 DIAGNOSIS — Z98.891 PREVIOUS CESAREAN SECTION: ICD-10-CM

## 2021-05-28 PROBLEM — Z3A.39 PREGNANCY WITH 39 COMPLETED WEEKS GESTATION: Status: ACTIVE | Noted: 2021-05-28

## 2021-05-28 PROCEDURE — 25010000003 CEFAZOLIN IN DEXTROSE 2-4 GM/100ML-% SOLUTION: Performed by: OBSTETRICS & GYNECOLOGY

## 2021-05-28 PROCEDURE — 25010000003 MORPHINE PER 10 MG: Performed by: NURSE ANESTHETIST, CERTIFIED REGISTERED

## 2021-05-28 PROCEDURE — 25010000002 FENTANYL CITRATE (PF) 50 MCG/ML SOLUTION: Performed by: NURSE ANESTHETIST, CERTIFIED REGISTERED

## 2021-05-28 PROCEDURE — 59025 FETAL NON-STRESS TEST: CPT

## 2021-05-28 PROCEDURE — 59515 CESAREAN DELIVERY: CPT | Performed by: OBSTETRICS & GYNECOLOGY

## 2021-05-28 PROCEDURE — 25010000002 KETOROLAC TROMETHAMINE PER 15 MG: Performed by: OBSTETRICS & GYNECOLOGY

## 2021-05-28 PROCEDURE — 25010000002 ONDANSETRON PER 1 MG: Performed by: NURSE ANESTHETIST, CERTIFIED REGISTERED

## 2021-05-28 PROCEDURE — 25010000002 HYDROMORPHONE PER 4 MG: Performed by: NURSE ANESTHETIST, CERTIFIED REGISTERED

## 2021-05-28 PROCEDURE — 25010000002 ONDANSETRON PER 1 MG: Performed by: OBSTETRICS & GYNECOLOGY

## 2021-05-28 PROCEDURE — 25010000002 METOCLOPRAMIDE PER 10 MG: Performed by: NURSE ANESTHETIST, CERTIFIED REGISTERED

## 2021-05-28 PROCEDURE — S0260 H&P FOR SURGERY: HCPCS | Performed by: OBSTETRICS & GYNECOLOGY

## 2021-05-28 PROCEDURE — 25010000002 PROMETHAZINE PER 50 MG: Performed by: NURSE ANESTHETIST, CERTIFIED REGISTERED

## 2021-05-28 RX ORDER — OXYTOCIN-SODIUM CHLORIDE 0.9% IV SOLN 30 UNIT/500ML 30-0.9/5 UT/ML-%
650 SOLUTION INTRAVENOUS ONCE
Status: DISCONTINUED | OUTPATIENT
Start: 2021-05-28 | End: 2021-05-28 | Stop reason: HOSPADM

## 2021-05-28 RX ORDER — KETOROLAC TROMETHAMINE 15 MG/ML
15 INJECTION, SOLUTION INTRAMUSCULAR; INTRAVENOUS EVERY 6 HOURS
Status: COMPLETED | OUTPATIENT
Start: 2021-05-28 | End: 2021-05-29

## 2021-05-28 RX ORDER — LIDOCAINE HYDROCHLORIDE 10 MG/ML
5 INJECTION, SOLUTION EPIDURAL; INFILTRATION; INTRACAUDAL; PERINEURAL AS NEEDED
Status: DISCONTINUED | OUTPATIENT
Start: 2021-05-28 | End: 2021-05-28 | Stop reason: HOSPADM

## 2021-05-28 RX ORDER — BUPIVACAINE HCL/0.9 % NACL/PF 0.125 %
PLASTIC BAG, INJECTION (ML) EPIDURAL AS NEEDED
Status: DISCONTINUED | OUTPATIENT
Start: 2021-05-28 | End: 2021-05-28 | Stop reason: SURG

## 2021-05-28 RX ORDER — ACETAMINOPHEN 325 MG/1
650 TABLET ORAL EVERY 6 HOURS
Status: DISCONTINUED | OUTPATIENT
Start: 2021-05-29 | End: 2021-05-30 | Stop reason: HOSPADM

## 2021-05-28 RX ORDER — OXYCODONE HYDROCHLORIDE 5 MG/1
5 TABLET ORAL EVERY 4 HOURS PRN
Status: DISCONTINUED | OUTPATIENT
Start: 2021-05-28 | End: 2021-05-30 | Stop reason: HOSPADM

## 2021-05-28 RX ORDER — PROMETHAZINE HYDROCHLORIDE 12.5 MG/1
12.5 SUPPOSITORY RECTAL EVERY 6 HOURS PRN
Status: DISCONTINUED | OUTPATIENT
Start: 2021-05-28 | End: 2021-05-28 | Stop reason: HOSPADM

## 2021-05-28 RX ORDER — OXYTOCIN-SODIUM CHLORIDE 0.9% IV SOLN 30 UNIT/500ML 30-0.9/5 UT/ML-%
SOLUTION INTRAVENOUS AS NEEDED
Status: DISCONTINUED | OUTPATIENT
Start: 2021-05-28 | End: 2021-05-28 | Stop reason: SURG

## 2021-05-28 RX ORDER — SODIUM CHLORIDE 0.9 % (FLUSH) 0.9 %
10 SYRINGE (ML) INJECTION AS NEEDED
Status: DISCONTINUED | OUTPATIENT
Start: 2021-05-28 | End: 2021-05-28 | Stop reason: HOSPADM

## 2021-05-28 RX ORDER — PROMETHAZINE HYDROCHLORIDE 12.5 MG/1
12.5 TABLET ORAL EVERY 6 HOURS PRN
Status: DISCONTINUED | OUTPATIENT
Start: 2021-05-28 | End: 2021-05-28 | Stop reason: HOSPADM

## 2021-05-28 RX ORDER — LANOLIN
CREAM (ML) TOPICAL
Status: DISCONTINUED | OUTPATIENT
Start: 2021-05-28 | End: 2021-05-30 | Stop reason: HOSPADM

## 2021-05-28 RX ORDER — SODIUM CHLORIDE 0.9 % (FLUSH) 0.9 %
3-10 SYRINGE (ML) INJECTION AS NEEDED
Status: DISCONTINUED | OUTPATIENT
Start: 2021-05-28 | End: 2021-05-30 | Stop reason: HOSPADM

## 2021-05-28 RX ORDER — HYDROMORPHONE HYDROCHLORIDE 1 MG/ML
0.5 INJECTION, SOLUTION INTRAMUSCULAR; INTRAVENOUS; SUBCUTANEOUS
Status: ACTIVE | OUTPATIENT
Start: 2021-05-28 | End: 2021-05-29

## 2021-05-28 RX ORDER — ACETAMINOPHEN 500 MG
1000 TABLET ORAL ONCE
Status: COMPLETED | OUTPATIENT
Start: 2021-05-28 | End: 2021-05-28

## 2021-05-28 RX ORDER — SODIUM CHLORIDE 0.9 % (FLUSH) 0.9 %
3 SYRINGE (ML) INJECTION EVERY 12 HOURS SCHEDULED
Status: DISCONTINUED | OUTPATIENT
Start: 2021-05-28 | End: 2021-05-28 | Stop reason: HOSPADM

## 2021-05-28 RX ORDER — IBUPROFEN 600 MG/1
600 TABLET ORAL EVERY 6 HOURS
Status: DISCONTINUED | OUTPATIENT
Start: 2021-05-29 | End: 2021-05-30 | Stop reason: HOSPADM

## 2021-05-28 RX ORDER — MORPHINE SULFATE 2 MG/ML
2 INJECTION, SOLUTION INTRAMUSCULAR; INTRAVENOUS
Status: DISCONTINUED | OUTPATIENT
Start: 2021-05-28 | End: 2021-05-28 | Stop reason: HOSPADM

## 2021-05-28 RX ORDER — CEFAZOLIN SODIUM 2 G/100ML
2 INJECTION, SOLUTION INTRAVENOUS ONCE
Status: COMPLETED | OUTPATIENT
Start: 2021-05-28 | End: 2021-05-28

## 2021-05-28 RX ORDER — ALUMINA, MAGNESIA, AND SIMETHICONE 2400; 2400; 240 MG/30ML; MG/30ML; MG/30ML
15 SUSPENSION ORAL EVERY 4 HOURS PRN
Status: DISCONTINUED | OUTPATIENT
Start: 2021-05-28 | End: 2021-05-30 | Stop reason: HOSPADM

## 2021-05-28 RX ORDER — ACETAMINOPHEN 500 MG
1000 TABLET ORAL EVERY 6 HOURS
Status: COMPLETED | OUTPATIENT
Start: 2021-05-28 | End: 2021-05-29

## 2021-05-28 RX ORDER — DIPHENHYDRAMINE HCL 25 MG
25 CAPSULE ORAL EVERY 4 HOURS PRN
Status: DISCONTINUED | OUTPATIENT
Start: 2021-05-28 | End: 2021-05-30 | Stop reason: HOSPADM

## 2021-05-28 RX ORDER — FENTANYL CITRATE 50 UG/ML
INJECTION, SOLUTION INTRAMUSCULAR; INTRAVENOUS AS NEEDED
Status: DISCONTINUED | OUTPATIENT
Start: 2021-05-28 | End: 2021-05-28 | Stop reason: SURG

## 2021-05-28 RX ORDER — OXYCODONE HYDROCHLORIDE 5 MG/1
10 TABLET ORAL EVERY 4 HOURS PRN
Status: DISCONTINUED | OUTPATIENT
Start: 2021-05-28 | End: 2021-05-30 | Stop reason: HOSPADM

## 2021-05-28 RX ORDER — CARBOPROST TROMETHAMINE 250 UG/ML
250 INJECTION, SOLUTION INTRAMUSCULAR AS NEEDED
Status: DISCONTINUED | OUTPATIENT
Start: 2021-05-28 | End: 2021-05-30 | Stop reason: HOSPADM

## 2021-05-28 RX ORDER — ONDANSETRON 2 MG/ML
INJECTION INTRAMUSCULAR; INTRAVENOUS AS NEEDED
Status: DISCONTINUED | OUTPATIENT
Start: 2021-05-28 | End: 2021-05-28 | Stop reason: SURG

## 2021-05-28 RX ORDER — MORPHINE SULFATE 0.5 MG/ML
INJECTION, SOLUTION EPIDURAL; INTRATHECAL; INTRAVENOUS AS NEEDED
Status: DISCONTINUED | OUTPATIENT
Start: 2021-05-28 | End: 2021-05-28 | Stop reason: SURG

## 2021-05-28 RX ORDER — OXYTOCIN-SODIUM CHLORIDE 0.9% IV SOLN 30 UNIT/500ML 30-0.9/5 UT/ML-%
85 SOLUTION INTRAVENOUS ONCE
Status: DISCONTINUED | OUTPATIENT
Start: 2021-05-28 | End: 2021-05-28 | Stop reason: HOSPADM

## 2021-05-28 RX ORDER — FAMOTIDINE 10 MG/ML
INJECTION, SOLUTION INTRAVENOUS AS NEEDED
Status: DISCONTINUED | OUTPATIENT
Start: 2021-05-28 | End: 2021-05-28 | Stop reason: SURG

## 2021-05-28 RX ORDER — MISOPROSTOL 200 UG/1
600 TABLET ORAL AS NEEDED
Status: DISCONTINUED | OUTPATIENT
Start: 2021-05-28 | End: 2021-05-30 | Stop reason: HOSPADM

## 2021-05-28 RX ORDER — TRISODIUM CITRATE DIHYDRATE AND CITRIC ACID MONOHYDRATE 500; 334 MG/5ML; MG/5ML
30 SOLUTION ORAL ONCE
Status: COMPLETED | OUTPATIENT
Start: 2021-05-28 | End: 2021-05-28

## 2021-05-28 RX ORDER — SIMETHICONE 80 MG
80 TABLET,CHEWABLE ORAL 4 TIMES DAILY PRN
Status: DISCONTINUED | OUTPATIENT
Start: 2021-05-28 | End: 2021-05-30 | Stop reason: HOSPADM

## 2021-05-28 RX ORDER — DOCUSATE SODIUM 100 MG/1
100 CAPSULE, LIQUID FILLED ORAL 2 TIMES DAILY PRN
Status: DISCONTINUED | OUTPATIENT
Start: 2021-05-28 | End: 2021-05-30 | Stop reason: HOSPADM

## 2021-05-28 RX ORDER — KETOROLAC TROMETHAMINE 30 MG/ML
30 INJECTION, SOLUTION INTRAMUSCULAR; INTRAVENOUS ONCE
Status: COMPLETED | OUTPATIENT
Start: 2021-05-28 | End: 2021-05-28

## 2021-05-28 RX ORDER — METHYLERGONOVINE MALEATE 0.2 MG/ML
200 INJECTION INTRAVENOUS AS NEEDED
Status: DISCONTINUED | OUTPATIENT
Start: 2021-05-28 | End: 2021-05-30 | Stop reason: HOSPADM

## 2021-05-28 RX ORDER — BUPIVACAINE HYDROCHLORIDE 7.5 MG/ML
INJECTION, SOLUTION INTRASPINAL AS NEEDED
Status: DISCONTINUED | OUTPATIENT
Start: 2021-05-28 | End: 2021-05-28 | Stop reason: SURG

## 2021-05-28 RX ORDER — MISOPROSTOL 200 UG/1
800 TABLET ORAL AS NEEDED
Status: DISCONTINUED | OUTPATIENT
Start: 2021-05-28 | End: 2021-05-28 | Stop reason: HOSPADM

## 2021-05-28 RX ORDER — METHYLERGONOVINE MALEATE 0.2 MG/ML
200 INJECTION INTRAVENOUS AS NEEDED
Status: DISCONTINUED | OUTPATIENT
Start: 2021-05-28 | End: 2021-05-28 | Stop reason: HOSPADM

## 2021-05-28 RX ORDER — SODIUM CHLORIDE 0.9 % (FLUSH) 0.9 %
3 SYRINGE (ML) INJECTION EVERY 12 HOURS SCHEDULED
Status: DISCONTINUED | OUTPATIENT
Start: 2021-05-28 | End: 2021-05-30 | Stop reason: HOSPADM

## 2021-05-28 RX ORDER — ONDANSETRON 2 MG/ML
4 INJECTION INTRAMUSCULAR; INTRAVENOUS EVERY 6 HOURS PRN
Status: DISCONTINUED | OUTPATIENT
Start: 2021-05-28 | End: 2021-05-30 | Stop reason: HOSPADM

## 2021-05-28 RX ORDER — PROMETHAZINE HYDROCHLORIDE 12.5 MG/1
12.5 SUPPOSITORY RECTAL EVERY 6 HOURS PRN
Status: DISCONTINUED | OUTPATIENT
Start: 2021-05-28 | End: 2021-05-30 | Stop reason: HOSPADM

## 2021-05-28 RX ORDER — PROMETHAZINE HYDROCHLORIDE 25 MG/1
25 TABLET ORAL EVERY 6 HOURS PRN
Status: DISCONTINUED | OUTPATIENT
Start: 2021-05-28 | End: 2021-05-30 | Stop reason: HOSPADM

## 2021-05-28 RX ORDER — HYDROCORTISONE 25 MG/G
1 CREAM TOPICAL AS NEEDED
Status: DISCONTINUED | OUTPATIENT
Start: 2021-05-28 | End: 2021-05-30 | Stop reason: HOSPADM

## 2021-05-28 RX ORDER — CARBOPROST TROMETHAMINE 250 UG/ML
250 INJECTION, SOLUTION INTRAMUSCULAR AS NEEDED
Status: DISCONTINUED | OUTPATIENT
Start: 2021-05-28 | End: 2021-05-28 | Stop reason: HOSPADM

## 2021-05-28 RX ORDER — CALCIUM CARBONATE 200(500)MG
1 TABLET,CHEWABLE ORAL EVERY 4 HOURS PRN
Status: DISCONTINUED | OUTPATIENT
Start: 2021-05-28 | End: 2021-05-30 | Stop reason: HOSPADM

## 2021-05-28 RX ORDER — SODIUM CHLORIDE, SODIUM LACTATE, POTASSIUM CHLORIDE, CALCIUM CHLORIDE 600; 310; 30; 20 MG/100ML; MG/100ML; MG/100ML; MG/100ML
125 INJECTION, SOLUTION INTRAVENOUS CONTINUOUS
Status: DISCONTINUED | OUTPATIENT
Start: 2021-05-28 | End: 2021-05-28

## 2021-05-28 RX ORDER — METOCLOPRAMIDE HYDROCHLORIDE 5 MG/ML
INJECTION INTRAMUSCULAR; INTRAVENOUS AS NEEDED
Status: DISCONTINUED | OUTPATIENT
Start: 2021-05-28 | End: 2021-05-28 | Stop reason: SURG

## 2021-05-28 RX ADMIN — KETOROLAC TROMETHAMINE 30 MG: 30 INJECTION, SOLUTION INTRAMUSCULAR; INTRAVENOUS at 09:36

## 2021-05-28 RX ADMIN — SODIUM CITRATE AND CITRIC ACID MONOHYDRATE 30 ML: 500; 334 SOLUTION ORAL at 07:33

## 2021-05-28 RX ADMIN — FAMOTIDINE 20 MG: 10 INJECTION, SOLUTION INTRAVENOUS at 07:37

## 2021-05-28 RX ADMIN — ACETAMINOPHEN 1000 MG: 500 TABLET, FILM COATED ORAL at 14:21

## 2021-05-28 RX ADMIN — SODIUM CHLORIDE, POTASSIUM CHLORIDE, SODIUM LACTATE AND CALCIUM CHLORIDE 125 ML/HR: 600; 310; 30; 20 INJECTION, SOLUTION INTRAVENOUS at 07:22

## 2021-05-28 RX ADMIN — SIMETHICONE 80 MG: 80 TABLET, CHEWABLE ORAL at 20:23

## 2021-05-28 RX ADMIN — ACETAMINOPHEN 1000 MG: 500 TABLET, FILM COATED ORAL at 07:08

## 2021-05-28 RX ADMIN — OXYTOCIN 500 ML: 10 INJECTION INTRAVENOUS at 08:25

## 2021-05-28 RX ADMIN — ONDANSETRON 4 MG: 2 INJECTION INTRAMUSCULAR; INTRAVENOUS at 07:37

## 2021-05-28 RX ADMIN — ONDANSETRON HYDROCHLORIDE 4 MG: 2 INJECTION, SOLUTION INTRAMUSCULAR; INTRAVENOUS at 09:37

## 2021-05-28 RX ADMIN — BUPIVACAINE HYDROCHLORIDE IN DEXTROSE 1.3 MG: 7.5 INJECTION, SOLUTION SUBARACHNOID at 07:44

## 2021-05-28 RX ADMIN — FENTANYL CITRATE 15 MCG: 50 INJECTION, SOLUTION INTRAMUSCULAR; INTRAVENOUS at 07:44

## 2021-05-28 RX ADMIN — CEFAZOLIN SODIUM 2 G: 2 INJECTION, SOLUTION INTRAVENOUS at 07:08

## 2021-05-28 RX ADMIN — MORPHINE SULFATE 150 MCG: 0.5 INJECTION, SOLUTION EPIDURAL; INTRATHECAL; INTRAVENOUS at 07:44

## 2021-05-28 RX ADMIN — Medication 100 MCG: at 07:54

## 2021-05-28 RX ADMIN — Medication 100 MCG: at 08:01

## 2021-05-28 RX ADMIN — DOCUSATE SODIUM 100 MG: 100 CAPSULE, LIQUID FILLED ORAL at 20:22

## 2021-05-28 RX ADMIN — METOCLOPRAMIDE 10 MG: 5 INJECTION, SOLUTION INTRAMUSCULAR; INTRAVENOUS at 07:50

## 2021-05-28 RX ADMIN — ACETAMINOPHEN 1000 MG: 500 TABLET, FILM COATED ORAL at 20:23

## 2021-05-28 RX ADMIN — KETOROLAC TROMETHAMINE 15 MG: 15 INJECTION, SOLUTION INTRAMUSCULAR; INTRAVENOUS at 23:14

## 2021-05-28 RX ADMIN — SODIUM CHLORIDE, POTASSIUM CHLORIDE, SODIUM LACTATE AND CALCIUM CHLORIDE: 600; 310; 30; 20 INJECTION, SOLUTION INTRAVENOUS at 07:58

## 2021-05-28 RX ADMIN — HYDROMORPHONE HYDROCHLORIDE 0.5 MG: 1 INJECTION, SOLUTION INTRAMUSCULAR; INTRAVENOUS; SUBCUTANEOUS at 09:54

## 2021-05-28 RX ADMIN — KETOROLAC TROMETHAMINE 15 MG: 15 INJECTION, SOLUTION INTRAMUSCULAR; INTRAVENOUS at 17:01

## 2021-05-28 RX ADMIN — SODIUM CHLORIDE, POTASSIUM CHLORIDE, SODIUM LACTATE AND CALCIUM CHLORIDE 1000 ML: 600; 310; 30; 20 INJECTION, SOLUTION INTRAVENOUS at 06:45

## 2021-05-28 RX ADMIN — PROMETHAZINE HYDROCHLORIDE 12.5 MG: 25 INJECTION INTRAMUSCULAR; INTRAVENOUS at 10:38

## 2021-05-28 NOTE — ANESTHESIA POSTPROCEDURE EVALUATION
Patient: More Gaxiola    Procedure Summary     Date: 21 Room / Location: [facility] DELIV[facility]   [facility]    Anesthesia Start: 736 Anesthesia Stop: 852    Procedure:  SECTION REPEAT (N/A Abdomen) Diagnosis:       20 weeks gestation of pregnancy      History of  section      (20 weeks gestation of pregnancy [Z3A.20])      (History of  section [Z98.891])    Surgeons: Ariel Benitez MD Provider: Mari López DO    Anesthesia Type: ITN, spinal ASA Status: 2          Anesthesia Type: ITN, spinal    Vitals  Vitals Value Taken Time   /76 21 0849   Temp 97.6    Pulse 98 21 0851   Resp 17    SpO2 97 % 21 0850   Vitals shown include unvalidated device data.        Post Anesthesia Care and Evaluation    Patient location during evaluation: bedside  Patient participation: complete - patient participated  Level of consciousness: awake and alert  Pain management: adequate  Airway patency: patent  Anesthetic complications: No anesthetic complications    Cardiovascular status: acceptable  Respiratory status: acceptable  Hydration status: acceptable

## 2021-05-28 NOTE — ANESTHESIA PROCEDURE NOTES
Spinal Block      Patient reassessed immediately prior to procedure    Patient location during procedure: OR  Indication:at surgeon's request  Performed By  CRNA: Olga Sanchez CRNA  Preanesthetic Checklist  Completed: patient identified, IV checked, risks and benefits discussed, surgical consent, monitors and equipment checked, pre-op evaluation and timeout performed  Spinal Block Prep:  Patient Position:sitting  Sterile Tech:cap, gloves, mask and sterile barriers  Prep:Betadine  Patient Monitoring:blood pressure monitoring, continuous pulse oximetry and EKG  Spinal Block Procedure  Approach:midline  Guidance:palpation technique  Location:L4-L5  Needle Type:Phil  Needle Gauge:25 G  Placement of Spinal needle event:cerebrospinal fluid aspirated  Paresthesia: no  Fluid Appearance:clear     Post Assessment  Patient Tolerance:patient tolerated the procedure well with no apparent complications  Complications no

## 2021-05-28 NOTE — ANESTHESIA PREPROCEDURE EVALUATION
Anesthesia Evaluation     Patient summary reviewed and Nursing notes reviewed   NPO Solid Status: > 8 hours  NPO Liquid Status: > 8 hours           Airway   Mallampati: II  TM distance: >3 FB  Neck ROM: full  Dental      Pulmonary - negative pulmonary ROS   Cardiovascular     (+) hypertension,       Neuro/Psych  (+) psychiatric history Depression,     GI/Hepatic/Renal/Endo - negative ROS     Musculoskeletal (-) negative ROS    Abdominal    Substance History - negative use     OB/GYN    (+) Pregnant,         Other - negative ROS                       Anesthesia Plan    ASA 2     ITN and spinal       Anesthetic plan, all risks, benefits, and alternatives have been provided, discussed and informed consent has been obtained with: patient.  Use of blood products discussed with patient .   Plan discussed with attending.

## 2021-05-28 NOTE — ADDENDUM NOTE
Addended by: MEGAN TOMPKINS on: 5/28/2021 09:13 AM     Modules accepted: Level of Service    
Jessica Mosqueda

## 2021-05-29 LAB
HCT VFR BLD AUTO: 36.9 % (ref 34–46.6)
HGB BLD-MCNC: 11.2 G/DL (ref 12–15.9)

## 2021-05-29 PROCEDURE — 85014 HEMATOCRIT: CPT | Performed by: OBSTETRICS & GYNECOLOGY

## 2021-05-29 PROCEDURE — 0503F POSTPARTUM CARE VISIT: CPT | Performed by: OBSTETRICS & GYNECOLOGY

## 2021-05-29 PROCEDURE — 85018 HEMOGLOBIN: CPT | Performed by: OBSTETRICS & GYNECOLOGY

## 2021-05-29 PROCEDURE — 25010000002 KETOROLAC TROMETHAMINE PER 15 MG: Performed by: OBSTETRICS & GYNECOLOGY

## 2021-05-29 RX ADMIN — KETOROLAC TROMETHAMINE 15 MG: 15 INJECTION, SOLUTION INTRAMUSCULAR; INTRAVENOUS at 11:00

## 2021-05-29 RX ADMIN — IBUPROFEN 600 MG: 600 TABLET ORAL at 16:16

## 2021-05-29 RX ADMIN — ACETAMINOPHEN 650 MG: 325 TABLET ORAL at 20:33

## 2021-05-29 RX ADMIN — KETOROLAC TROMETHAMINE 15 MG: 15 INJECTION, SOLUTION INTRAMUSCULAR; INTRAVENOUS at 05:23

## 2021-05-29 RX ADMIN — ACETAMINOPHEN 1000 MG: 500 TABLET, FILM COATED ORAL at 02:29

## 2021-05-29 RX ADMIN — IBUPROFEN 600 MG: 600 TABLET ORAL at 23:05

## 2021-05-29 RX ADMIN — SIMETHICONE 80 MG: 80 TABLET, CHEWABLE ORAL at 08:33

## 2021-05-29 RX ADMIN — DOCUSATE SODIUM 100 MG: 100 CAPSULE, LIQUID FILLED ORAL at 08:33

## 2021-05-29 RX ADMIN — ACETAMINOPHEN 650 MG: 325 TABLET ORAL at 13:31

## 2021-05-29 RX ADMIN — ACETAMINOPHEN 1000 MG: 500 TABLET, FILM COATED ORAL at 08:33

## 2021-05-30 VITALS
RESPIRATION RATE: 16 BRPM | HEIGHT: 62 IN | OXYGEN SATURATION: 97 % | DIASTOLIC BLOOD PRESSURE: 82 MMHG | HEART RATE: 69 BPM | WEIGHT: 198 LBS | TEMPERATURE: 97.9 F | SYSTOLIC BLOOD PRESSURE: 120 MMHG | BODY MASS INDEX: 36.44 KG/M2

## 2021-05-30 PROCEDURE — 0503F POSTPARTUM CARE VISIT: CPT | Performed by: OBSTETRICS & GYNECOLOGY

## 2021-05-30 RX ORDER — OXYCODONE HYDROCHLORIDE AND ACETAMINOPHEN 5; 325 MG/1; MG/1
1 TABLET ORAL EVERY 4 HOURS PRN
Qty: 20 TABLET | Refills: 0 | Status: SHIPPED | OUTPATIENT
Start: 2021-05-30 | End: 2021-07-09

## 2021-05-30 RX ORDER — PSEUDOEPHEDRINE HCL 30 MG
100 TABLET ORAL 2 TIMES DAILY PRN
Qty: 60 CAPSULE | Refills: 2 | Status: SHIPPED | OUTPATIENT
Start: 2021-05-30 | End: 2021-07-09

## 2021-05-30 RX ORDER — IBUPROFEN 600 MG/1
600 TABLET ORAL EVERY 6 HOURS
Qty: 30 TABLET | Refills: 1 | Status: SHIPPED | OUTPATIENT
Start: 2021-05-30 | End: 2021-07-09

## 2021-05-30 RX ADMIN — DOCUSATE SODIUM 100 MG: 100 CAPSULE, LIQUID FILLED ORAL at 08:35

## 2021-05-30 RX ADMIN — IBUPROFEN 600 MG: 600 TABLET ORAL at 05:27

## 2021-05-30 RX ADMIN — ACETAMINOPHEN 650 MG: 325 TABLET ORAL at 08:35

## 2021-05-30 RX ADMIN — ACETAMINOPHEN 650 MG: 325 TABLET ORAL at 02:16

## 2021-05-30 RX ADMIN — SIMETHICONE 80 MG: 80 TABLET, CHEWABLE ORAL at 08:35

## 2021-06-11 ENCOUNTER — POSTPARTUM VISIT (OUTPATIENT)
Dept: OBSTETRICS AND GYNECOLOGY | Facility: CLINIC | Age: 21
End: 2021-06-11

## 2021-06-11 VITALS
WEIGHT: 175.4 LBS | HEIGHT: 62 IN | SYSTOLIC BLOOD PRESSURE: 116 MMHG | BODY MASS INDEX: 32.28 KG/M2 | RESPIRATION RATE: 14 BRPM | DIASTOLIC BLOOD PRESSURE: 80 MMHG

## 2021-06-11 PROBLEM — Z87.59 HISTORY OF GESTATIONAL HYPERTENSION: Status: RESOLVED | Noted: 2020-11-24 | Resolved: 2021-06-11

## 2021-06-11 PROBLEM — Z98.891 HISTORY OF CESAREAN SECTION: Status: RESOLVED | Noted: 2020-11-24 | Resolved: 2021-06-11

## 2021-06-11 PROBLEM — Z3A.29 29 WEEKS GESTATION OF PREGNANCY: Status: RESOLVED | Noted: 2020-12-09 | Resolved: 2021-06-11

## 2021-06-11 PROCEDURE — 0503F POSTPARTUM CARE VISIT: CPT | Performed by: OBSTETRICS & GYNECOLOGY

## 2021-06-11 NOTE — PROGRESS NOTES
Subjective   More Gaxiola is a 21 y.o. female is here today for follow-up.    Chief Complaint   Patient presents with   • Postpartum Care     No complaints        History of Present Illness  Patient is 2 weeks post repeat  section.  She is having no problems.  She is bottlefeeding and this is going well.  She has no postpartum depression.  Her fiancé is considering vasectomy for birth control.  Patient will return in 4 weeks.  The following portions of the patient's history were reviewed and updated as appropriate: allergies, current medications, past family history, past medical history, past social history, past surgical history and problem list.    Review of Systems - History obtained from the patient  General ROS: positive for  - weight loss  Psychological ROS: negative  ENT ROS: negative  Allergy and Immunology ROS: positive for - seasonal allergies  Hematological and Lymphatic ROS: negative  Endocrine ROS: negative  Breast ROS: negative for breast lumps  Respiratory ROS: no cough, shortness of breath, or wheezing  Cardiovascular ROS: no chest pain or dyspnea on exertion  Gastrointestinal ROS: no abdominal pain, change in bowel habits, or black or bloody stools  Genito-Urinary ROS: no dysuria, trouble voiding, or hematuria  Musculoskeletal ROS: negative  Neurological ROS: no TIA or stroke symptoms  Dermatological ROS: negative     Objective   General:  well developed; well nourished  no acute distress   Skin:  Not performed.   Thyroid: not examined   Breasts:  Not performed.   Abdomen: soft, non-tender; no masses  no umbilical or inguinal hernias are present  no hepato-splenomegaly  incision is clean, dry, intact and without drainage   Heart: regular rate and rhythm, S1, S2 normal, no murmur, click, rub or gallop   Lungs: clear to auscultation   Pelvis: Not performed.         Assessment/Plan   Diagnoses and all orders for this visit:    1. Postpartum care following  delivery  (Primary)      Return in 4 weeks    Ariel Benitez MD

## 2021-07-09 ENCOUNTER — POSTPARTUM VISIT (OUTPATIENT)
Dept: OBSTETRICS AND GYNECOLOGY | Facility: CLINIC | Age: 21
End: 2021-07-09

## 2021-07-09 VITALS
SYSTOLIC BLOOD PRESSURE: 118 MMHG | WEIGHT: 173 LBS | HEIGHT: 62 IN | DIASTOLIC BLOOD PRESSURE: 78 MMHG | BODY MASS INDEX: 31.83 KG/M2

## 2021-07-09 PROBLEM — Z3A.39 PREGNANCY WITH 39 COMPLETED WEEKS GESTATION: Status: RESOLVED | Noted: 2021-05-28 | Resolved: 2021-07-09

## 2021-07-09 PROBLEM — Z3A.20 20 WEEKS GESTATION OF PREGNANCY: Status: RESOLVED | Noted: 2021-01-20 | Resolved: 2021-07-09

## 2021-07-09 PROCEDURE — 0503F POSTPARTUM CARE VISIT: CPT | Performed by: OBSTETRICS & GYNECOLOGY

## 2021-07-09 NOTE — PROGRESS NOTES
"Subjective   Chief Complaint   Patient presents with   • Postpartum Care     6w0d PP, csection, baby boy, bottlefeeding. no c/o. not interested in birth control     More Gaxiola is a 21 y.o. year old  presenting to be seen for her postpartum visit.  She had a .  Her son is doing well.    Since delivery she has not been sexually active.  She does not have concerns about post-partum blues/depression.   [place] Score = 4  She is bottle feeding.  For ongoing contraception, her plans are vasectomy.    The following portions of the patient's history were reviewed and updated as appropriate:current medications and allergies    Social History    Tobacco Use      Smoking status: Never Smoker      Smokeless tobacco: Never Used      Review of Systems  Constitutional POS: nothing reported    NEG: anorexia or night sweats   Genitourinary POS: nothing reported    NEG: dysuria or hematuria      Gastointestinal POS: nothing reported    NEG: bloating, change in bowel habits, melena or reflux symptoms   Breast POS: nothing reported    NEG: persistent breast lump, skin dimpling or nipple discharge        Objective   /78   Ht 157.5 cm (62\")   Wt 78.5 kg (173 lb)   Breastfeeding No   BMI 31.64 kg/m²     General: well developed; well nourished  no acute distress   Skin: No suspicious lesions seen   Thyroid: normal to inspection and palpation   Heart:  Not performed.   Lungs: breathing is unlabored   Breasts:  Examined in supine position  Symmetric without masses or skin dimpling  Nipples normal without inversion, lesions or discharge  There are no palpable axillary nodes   Abdomen: soft, non-tender; no masses  no umbilical or inguinal hernias are present  no hepato-splenomegaly  incision is clean, dry, intact and without drainage   Pelvis: Clinical staff was present for exam  External genitalia:  normal appearance of the external genitalia including Bartholin's and [place]'s glands.  :  urethral " meatus normal;  Vaginal:  normal pink mucosa without prolapse or lesions.  Cervix:  normal appearance.  Uterus:  normal size, shape and consistency.  Adnexa:  normal bimanual exam of the adnexa.  Rectal:  digital rectal exam not performed; anus visually normal appearing.        Assessment   1. Normal 6 week postpartum exam     Plan   1. BC options reviewed and compared today: vasectomy  2. The importance of keeping all planned follow-up and taking all medications as prescribed was emphasized.  3. Follow up for annual exam in one year    No orders of the defined types were placed in this encounter.         This note was electronically signed.    Eli Rodriguez MD  July 9, 2021    Note: Speech recognition transcription software may have been used to create portions of this document.  An attempt at proofreading has been made but errors in transcription could still be present.

## 2024-10-03 ENCOUNTER — ANCILLARY ORDERS (OUTPATIENT)
Dept: ULTRASOUND IMAGING | Facility: CLINIC | Age: 24
End: 2024-10-03
Payer: COMMERCIAL

## 2024-10-03 DIAGNOSIS — O36.80X0 ENCOUNTER TO DETERMINE FETAL VIABILITY OF PREGNANCY, SINGLE OR UNSPECIFIED FETUS: Primary | ICD-10-CM

## 2024-10-10 ENCOUNTER — LAB (OUTPATIENT)
Dept: LAB | Facility: HOSPITAL | Age: 24
End: 2024-10-10
Payer: COMMERCIAL

## 2024-10-10 ENCOUNTER — INITIAL PRENATAL (OUTPATIENT)
Dept: OBSTETRICS AND GYNECOLOGY | Facility: CLINIC | Age: 24
End: 2024-10-10
Payer: COMMERCIAL

## 2024-10-10 VITALS — SYSTOLIC BLOOD PRESSURE: 120 MMHG | WEIGHT: 197 LBS | DIASTOLIC BLOOD PRESSURE: 86 MMHG | BODY MASS INDEX: 36.03 KG/M2

## 2024-10-10 DIAGNOSIS — Z34.82 PRENATAL CARE, SUBSEQUENT PREGNANCY IN SECOND TRIMESTER: ICD-10-CM

## 2024-10-10 DIAGNOSIS — Z34.82 PRENATAL CARE, SUBSEQUENT PREGNANCY IN SECOND TRIMESTER: Primary | ICD-10-CM

## 2024-10-10 LAB
ABO GROUP BLD: NORMAL
AMPHET+METHAMPHET UR QL: NEGATIVE
AMPHETAMINES UR QL: NEGATIVE
BARBITURATES UR QL SCN: NEGATIVE
BASOPHILS # BLD AUTO: 0.06 10*3/MM3 (ref 0–0.2)
BASOPHILS NFR BLD AUTO: 0.6 % (ref 0–1.5)
BENZODIAZ UR QL SCN: NEGATIVE
BLD GP AB SCN SERPL QL: NEGATIVE
BUPRENORPHINE SERPL-MCNC: NEGATIVE NG/ML
C TRACH RRNA SPEC DONR QL NAA+PROBE: NEGATIVE
CANNABINOIDS SERPL QL: NEGATIVE
COCAINE UR QL: NEGATIVE
DEPRECATED RDW RBC AUTO: 39.4 FL (ref 37–54)
EOSINOPHIL # BLD AUTO: 0.14 10*3/MM3 (ref 0–0.4)
EOSINOPHIL NFR BLD AUTO: 1.4 % (ref 0.3–6.2)
ERYTHROCYTE [DISTWIDTH] IN BLOOD BY AUTOMATED COUNT: 13 % (ref 12.3–15.4)
FENTANYL UR-MCNC: NEGATIVE NG/ML
HCT VFR BLD AUTO: 42.9 % (ref 34–46.6)
HGB BLD-MCNC: 14.3 G/DL (ref 12–15.9)
IMM GRANULOCYTES # BLD AUTO: 0.06 10*3/MM3 (ref 0–0.05)
IMM GRANULOCYTES NFR BLD AUTO: 0.6 % (ref 0–0.5)
LYMPHOCYTES # BLD AUTO: 2.38 10*3/MM3 (ref 0.7–3.1)
LYMPHOCYTES NFR BLD AUTO: 23.1 % (ref 19.6–45.3)
MCH RBC QN AUTO: 27.7 PG (ref 26.6–33)
MCHC RBC AUTO-ENTMCNC: 33.3 G/DL (ref 31.5–35.7)
MCV RBC AUTO: 83.1 FL (ref 79–97)
METHADONE UR QL SCN: NEGATIVE
MONOCYTES # BLD AUTO: 0.72 10*3/MM3 (ref 0.1–0.9)
MONOCYTES NFR BLD AUTO: 7 % (ref 5–12)
N GONORRHOEA DNA SPEC QL NAA+PROBE: NEGATIVE
NEUTROPHILS NFR BLD AUTO: 6.94 10*3/MM3 (ref 1.7–7)
NEUTROPHILS NFR BLD AUTO: 67.3 % (ref 42.7–76)
NRBC BLD AUTO-RTO: 0 /100 WBC (ref 0–0.2)
OPIATES UR QL: NEGATIVE
OXYCODONE UR QL SCN: NEGATIVE
PCP UR QL SCN: NEGATIVE
PLATELET # BLD AUTO: 330 10*3/MM3 (ref 140–450)
PMV BLD AUTO: 10.5 FL (ref 6–12)
RBC # BLD AUTO: 5.16 10*6/MM3 (ref 3.77–5.28)
RH BLD: POSITIVE
TRICYCLICS UR QL SCN: NEGATIVE
WBC NRBC COR # BLD AUTO: 10.3 10*3/MM3 (ref 3.4–10.8)

## 2024-10-10 PROCEDURE — 80307 DRUG TEST PRSMV CHEM ANLYZR: CPT | Performed by: OBSTETRICS & GYNECOLOGY

## 2024-10-10 PROCEDURE — 87086 URINE CULTURE/COLONY COUNT: CPT | Performed by: OBSTETRICS & GYNECOLOGY

## 2024-10-10 PROCEDURE — 86803 HEPATITIS C AB TEST: CPT

## 2024-10-10 PROCEDURE — 80081 OBSTETRIC PANEL INC HIV TSTG: CPT

## 2024-10-10 NOTE — PROGRESS NOTES
Subjective   Chief Complaint   Patient presents with    Initial Prenatal Visit     US done today       More Gaxiola is a 24 y.o. year old .  Patient's last menstrual period was 07/10/2024.  She presents to be seen to initiate prenatal care.     Social History    Tobacco Use      Smoking status: Never        Passive exposure: Never      Smokeless tobacco: Never      The following portions of the patient's history were reviewed and updated as appropriate:vital signs, allergies, current medications, past family history, past medical history, past social history, past surgical history, and problem list.    Objective   /86   Wt 89.4 kg (197 lb)   LMP 07/10/2024   BMI 36.03 kg/m²     General: well developed; well nourished  no acute distress  mentation appropriate   Skin: No suspicious lesions seen   Thyroid: normal to inspection and palpation   Heart:  Not performed.   Lungs: breathing is unlabored   Breasts:  Examined in supine position  Symmetric without masses or skin dimpling  Nipples normal without inversion, lesions or discharge  There are no palpable axillary nodes   Abdomen: soft, non-tender; no masses  no umbilical or inguinal hernias are present  no hepato-splenomegaly   Pelvis: Clinical staff was present for exam  External genitalia:  normal appearance of the external genitalia including Bartholin's and Limon's glands.  :  urethral meatus normal;  Vaginal:  normal pink mucosa without prolapse or lesions.  Cervix:  normal appearance.  Uterus:  normal size, shape and consistency.  Adnexa:  normal bimanual exam of the adnexa.  Rectal:  digital rectal exam not performed; anus visually normal appearing.       Lab Review   No data reviewed    Imaging   No data reviewed    Assessment & Plan   ASSESSMENT  24 y.o. year old  at 13w1d confirmed by ultrasound today   Supervision of low risk pregnancy  Previous C/S with route of delivery to be determined    PLAN  The problem list for  pregnancy was initiated today  Tests ordered today:  Orders Placed This Encounter   Procedures    Chlamydia trachomatis, Neisseria gonorrhoeae, Trichomonas vaginalis, PCR - Swab, Cervix     Standing Status:   Future     Number of Occurrences:   1     Standing Expiration Date:   2024     Order Specific Question:   Release to patient     Answer:   Routine Release [[id number]]    Urine Culture - Urine, Urine, Clean Catch     Standing Status:   Future     Number of Occurrences:   1     Standing Expiration Date:   10/9/2025     Order Specific Question:   Release to patient     Answer:   Routine Release [[id number]]    [facility] [place]     Standing Status:   Future     Standing Expiration Date:   10/10/2025     Order Specific Question:   Reason for Exam:     Answer:   history of c/s     Order Specific Question:   Release to patient     Answer:   Routine Release [[id number]]    OB Panel With HIV and RPR     Standing Status:   Future     Number of Occurrences:   1     Standing Expiration Date:   10/9/2025     Order Specific Question:   Release to patient     Answer:   Routine Release [[id number]]    Hepatitis C Antibody     Standing Status:   Future     Number of Occurrences:   1     Standing Expiration Date:   10/9/2025     Order Specific Question:   Release to patient     Answer:   Routine Release [[id number]]    Urine Drug Screen - Urine, Clean Catch     Please confirm all positive UDS     Standing Status:   Future     Number of Occurrences:   1     Standing Expiration Date:   10/9/2025     Order Specific Question:   Release to patient     Answer:   Routine Release [[id number]]     Testing for GC / Chlamydia / trichomonas was done today  Pap was done today.  If she does not receive the results of the Pap within 2 weeks  time, she was instructed to call to find out the results.  I explained to More that the recommendations for Pap smear interval in a low risk patient's has lengthened to 3 years  time.  I encouraged her to be seen yearly for a full physical exam including breast and pelvic exam even during the off years when PAP's will not be performed.  Genetic testing reviewed: NIPT ([facility]), carrier screening (SMA and CF), and they have considered the information and are not interested in having genetic testing performed.  Information reviewed: exercise in pregnancy, nutrition in pregnancy, weight gain in pregnancy, work and travel restrictions during pregnancy, list of OTC medications acceptable in pregnancy, and call coverage groups    Total time spent today with More  was 30 minutes (level 4).  Of this time, > 50% was spent face-to-face time coordinating care, answering her questions and counseling regarding pathophysiology of her presenting problem along with plans for any diagnositc work-up and treatment.    Follow up: 4 week(s)       This note was electronically signed.    Eli Rodriguez MD  October 10, 2024

## 2024-10-11 LAB
HBV SURFACE AG SERPL QL IA: NORMAL
HCV AB SER QL: NORMAL
HIV 1+2 AB+HIV1 P24 AG SERPL QL IA: NORMAL
REF LAB TEST METHOD: NORMAL
RPR SER QL: NORMAL
RUBV IGG SERPL IA-ACNC: 5.54 INDEX

## 2024-10-12 LAB — BACTERIA SPEC AEROBE CULT: NO GROWTH

## 2024-10-14 ENCOUNTER — DOCUMENTATION (OUTPATIENT)
Dept: OBSTETRICS AND GYNECOLOGY | Facility: CLINIC | Age: 24
End: 2024-10-14
Payer: COMMERCIAL

## 2024-11-08 ENCOUNTER — ROUTINE PRENATAL (OUTPATIENT)
Dept: OBSTETRICS AND GYNECOLOGY | Facility: CLINIC | Age: 24
End: 2024-11-08
Payer: COMMERCIAL

## 2024-11-08 VITALS — SYSTOLIC BLOOD PRESSURE: 110 MMHG | BODY MASS INDEX: 35.45 KG/M2 | WEIGHT: 193.8 LBS | DIASTOLIC BLOOD PRESSURE: 70 MMHG

## 2024-11-08 DIAGNOSIS — Z3A.17 17 WEEKS GESTATION OF PREGNANCY: Primary | ICD-10-CM

## 2024-11-08 DIAGNOSIS — Z34.82 PRENATAL CARE, SUBSEQUENT PREGNANCY IN SECOND TRIMESTER: ICD-10-CM

## 2024-11-08 NOTE — PROGRESS NOTES
Chief Complaint   Patient presents with    Routine Prenatal Visit       HPI: More is a  currently at 17w2d who today reports the following:  Contractions - No; Leaking - No; Vaginal bleeding -  No; Heartburn - No.    ROS:  GI: Nausea - No ; Constipation - No; Diarrhea - No    Neuro: Headache - No ; Visual change - No      EXAM:  Vitals: See prenatal flowsheet   Abdomen: See prenatal flowsheet   Urine glucose/protein: See prenatal flowsheet   Pelvic: See prenatal flowsheet     Lab Results   Component Value Date    ABO O 10/10/2024    RH Positive 10/10/2024    ABSCRN Negative 10/10/2024       MDM:  Impression: Supervision of low risk pregnancy  Previous C/S with route of delivery to be determined   Tests done today: none   Topics discussed: Continue with PNV's  Prenatal labs reviewed   labor signs and symptoms   Tests scheduled today for her next visit:   U/s at [facility] for fas

## 2024-11-20 ENCOUNTER — REFERRAL TRIAGE (OUTPATIENT)
Dept: LABOR AND DELIVERY | Facility: HOSPITAL | Age: 24
End: 2024-11-20
Payer: COMMERCIAL

## 2024-12-04 ENCOUNTER — PATIENT OUTREACH (OUTPATIENT)
Dept: LABOR AND DELIVERY | Facility: HOSPITAL | Age: 24
End: 2024-12-04
Payer: COMMERCIAL

## 2024-12-05 ENCOUNTER — OFFICE VISIT (OUTPATIENT)
Dept: OBSTETRICS AND GYNECOLOGY | Facility: HOSPITAL | Age: 24
End: 2024-12-05
Payer: COMMERCIAL

## 2024-12-05 ENCOUNTER — HOSPITAL ENCOUNTER (OUTPATIENT)
Dept: WOMENS IMAGING | Facility: HOSPITAL | Age: 24
Discharge: HOME | End: 2024-12-05
Admitting: OBSTETRICS & GYNECOLOGY
Payer: COMMERCIAL

## 2024-12-05 ENCOUNTER — ROUTINE PRENATAL (OUTPATIENT)
Dept: OBSTETRICS AND GYNECOLOGY | Facility: CLINIC | Age: 24
End: 2024-12-05
Payer: COMMERCIAL

## 2024-12-05 VITALS — WEIGHT: 195.2 LBS | DIASTOLIC BLOOD PRESSURE: 80 MMHG | BODY MASS INDEX: 35.7 KG/M2 | SYSTOLIC BLOOD PRESSURE: 114 MMHG

## 2024-12-05 VITALS — DIASTOLIC BLOOD PRESSURE: 80 MMHG | BODY MASS INDEX: 35.85 KG/M2 | WEIGHT: 196 LBS | SYSTOLIC BLOOD PRESSURE: 120 MMHG

## 2024-12-05 DIAGNOSIS — Z87.59 HISTORY OF GESTATIONAL HYPERTENSION: Primary | ICD-10-CM

## 2024-12-05 DIAGNOSIS — O34.219 PREVIOUS CESAREAN DELIVERY AFFECTING PREGNANCY: ICD-10-CM

## 2024-12-05 DIAGNOSIS — Z34.82 PRENATAL CARE, SUBSEQUENT PREGNANCY IN SECOND TRIMESTER: ICD-10-CM

## 2024-12-05 DIAGNOSIS — Z87.59 HISTORY OF GESTATIONAL HYPERTENSION: ICD-10-CM

## 2024-12-05 DIAGNOSIS — Z23 INFLUENZA VACCINE NEEDED: ICD-10-CM

## 2024-12-05 DIAGNOSIS — Z34.90 PREGNANCY, UNSPECIFIED GESTATIONAL AGE: ICD-10-CM

## 2024-12-05 DIAGNOSIS — Z34.82 PRENATAL CARE, SUBSEQUENT PREGNANCY IN SECOND TRIMESTER: Primary | ICD-10-CM

## 2024-12-05 PROCEDURE — 76811 OB US DETAILED SNGL FETUS: CPT

## 2024-12-05 RX ORDER — ASPIRIN 81 MG/1
81 TABLET ORAL DAILY
Qty: 30 TABLET | Refills: 11 | Status: SHIPPED | OUTPATIENT
Start: 2024-12-05

## 2024-12-05 NOTE — PROGRESS NOTES
Patient denies bleeding, leaking fluid or contractions  NIPT declined  Patients next follow up with Dr. Rodriguez is today

## 2024-12-05 NOTE — ASSESSMENT & PLAN NOTE
Women with a history of previous preeclampsia are at increased risk of preeclampsia and other adverse pregnancy outcomes in subsequent pregnancies. The magnitude of this risk is dependent on gestational age at time of disease onset, severity of disease and the presence or absence of preexisting medical disorders. The objective in the management of these patients is to reduce risk factors by optimizing maternal health before conception and to detect obstetric complications as early as possible. This objective can be achieved by formulating a rational approach that includes preconception evaluation and counseling, early  care, frequent monitoring of maternal and fetal well-being and timely delivery.     Recent studies have confirmed that there is no single biomarker that can be clinically useful for the prediction of recurrent preeclampsia. Combinations of biomarkers and biophysical parameters appear promising but more data are needed to confirm this use in clinical practice.  Further, a committee opinion published by the American College of Obstetricians and Gynecologists in 2015 noted 1st trimester screening tests for preeclampsia have low positive predictive value and there are no data demonstrating that they lead of improved outcomes and are therefore not suggested. Supplementation with fish oil, calcium or vitamin C and E and the use of antihypertensives have been shown to be ineffective in the prevention of recurrent preeclampsia and are not recommended.     In 2014, the USPSTF (U.S. Preventive Services Task Force) recommended low-dose aspirin after 12 weeks' gestation for pregnant women at high risk for preeclampsia (Vikki Intern Med 2014; 161:819).  In a  update, researchers incorporated data from several newer trials, including the large, multicenter ASPRE trial (N Engl J Med 2017; 377:613).   The Task Force continues to recommend daily low-dose aspirin (81 mg/day) after 12 weeks'  gestation as a preventive measure for women at high risk for preeclampsia.  (B recommendation; moderate-certainty evidence of substantial net benefit).  As data on optimal risk prediction for preeclampsia are lacking, the USPSTF again offers a pragmatic approach to identifying at-risk individuals.  It recommends treatment for women with one or more high-risk factors or two or more moderate-risk factors.      High-risk factors:  History of preeclampsia, multifetal gestation, chronic hypertension, pregestational (type 1 or type 2) diabetes, kidney disease, or autoimmune disease (e.g., systemic lupus erythematous, antiphospholipid syndrome).      Moderate-risk factors:  Nulliparity, body-mass index >=30 kg/m2, family history (i.e., preeclampsia in mother or sister), age >=35, in vitro conception, personal history factors (i.e., previous adverse pregnancy outcome or intrauterine growth restriction >=10-year pregnancy interval), lower socioeconomic status, or Black race (due to a confluence of environmental, social and historical inequities rather than biology).      Although the 2014 USPSTF recommendations are unchanged, pooling of additional data for the meta-analysis strengthens evidence of safety as well as efficacy (lower risk for preeclampsia,  birth and fetal growth restriction) of low-dose aspirin.  Lastly, the meta-analysis further validates the association between low-dose aspirin and lower  mortality (relative risk reduction, 21%).      I recommend more frequent monitoring for signs and symptoms of severe hypertension or preeclampsia than that recommended for normal pregnancy. This monitoring may include more frequent prenatal visits, home blood pressure monitoring or nursing contacts. For patients with a prior pregnancy complicated by preeclampsia with fetal growth restriction, I recommend serial ultrasound evaluation of fetal growth and amniotic fluid volume. The development of severe  gestational hypertension, fetal growth restriction or recurrent preeclampsia requires maternal hospitalization.     (from Riki and Kenyon, Obstet Gynecol 2008;112:359-72; oJhnny SKINNERT et al.  ERASMO 2021 Sep 28; 326:1192.)

## 2024-12-05 NOTE — LETTER
2024     Eli Rodriguez MD  [address] [facility]  [address] [place] [address]    Patient: More Gaxiola   YOB: 2000   Date of Visit: 2024     Dear Eli Rodriguez MD:       Thank you for referring More Gaxiola to me for evaluation. Below are the relevant portions of my assessment and plan of care.    If you have questions, please do not hesitate to call me. I look forward to following More along with you.         Sincerely,        Douglas A. Milligan, MD        CC: No Recipients    Milligan, Douglas A, MD  [phone]  Sign when Signing Visit  Documentation of the ultrasound findings, images, and interpretations will be available in the patient's Viewpoint report which is located in the imaging tab in chart review.    Maternal/Fetal Medicine Consult Note     Name: More Gaxiola    : 2000     MRN: [id number]     Referring Provider: Eli Rodriguez MD    Chief Complaint  Hx C/S x 2, HX GHTN     Subjective     History of Present Illness:  More Gaxiola is a 24 y.o.  21w1d who presents today for prior GHTN    CLARIBEL: Estimated Date of Delivery: 25     ROS:   As noted in HPI.     Past Medical History:   Diagnosis Date   • Depression     Postpartum - not currently a problem   • Gestational hypertension    • Seasonal allergies       Past Surgical History:   Procedure Laterality Date   •  SECTION N/A 7/3/2019    Procedure: Primary LTCS (2 layer closure);  Surgeon: Everardo South MD;  Location: [facility] LABOR DELIVERY;  Service: Obstetrics/Gynecology   •  SECTION N/A 2021    Procedure:  SECTION REPEAT;  Surgeon: Ariel Benitez MD;  Location:  [facility] LABOR DELIVERY;  Service: Obstetrics/Gynecology;  Laterality: N/A;   • FOREIGN BODY REMOVAL      glass removed from foot   • WISDOM TOOTH EXTRACTION        OB History          3    Para   2    Term   2       0    AB   0     "Living   2         SAB   0    IAB   0    Ectopic   0    Molar   0    Multiple   0    Live Births   2          Obstetric Comments   2019 - John  2021 - Ant               Objective     Vital Signs  /80   Wt 88.9 kg (196 lb)   LMP 07/10/2024   Estimated body mass index is 35.85 kg/m² as calculated from the following:    Height as of 21: 157.5 cm (62\").    Weight as of this encounter: 88.9 kg (196 lb).    Physical Exam    Ultrasound Impression:   See Viewpoint    Assessment and Plan     More Gaxiola is a 24 y.o.  21w1d who presents today for prior GHTN    Diagnoses and all orders for this visit:    1. History of gestational hypertension (Primary)  Assessment & Plan:  Women with a history of previous preeclampsia are at increased risk of preeclampsia and other adverse pregnancy outcomes in subsequent pregnancies. The magnitude of this risk is dependent on gestational age at time of disease onset, severity of disease and the presence or absence of preexisting medical disorders. The objective in the management of these patients is to reduce risk factors by optimizing maternal health before conception and to detect obstetric complications as early as possible. This objective can be achieved by formulating a rational approach that includes preconception evaluation and counseling, early  care, frequent monitoring of maternal and fetal well-being and timely delivery.     Recent studies have confirmed that there is no single biomarker that can be clinically useful for the prediction of recurrent preeclampsia. Combinations of biomarkers and biophysical parameters appear promising but more data are needed to confirm this use in clinical practice.  Further, a committee opinion published by the American College of Obstetricians and Gynecologists in 2015 noted 1st trimester screening tests for preeclampsia have low positive predictive value and there are no data " demonstrating that they lead of improved outcomes and are therefore not suggested. Supplementation with fish oil, calcium or vitamin C and E and the use of antihypertensives have been shown to be ineffective in the prevention of recurrent preeclampsia and are not recommended.     In 2014, the USPSTF (U.S. Preventive Services Task Force) recommended low-dose aspirin after 12 weeks' gestation for pregnant women at high risk for preeclampsia (Vikki Intern Med 2014; 161:819).  In a 2021 update, researchers incorporated data from several newer trials, including the large, multicenter ASPRE trial (N Engl J Med 2017; 377:613).   The Task Force continues to recommend daily low-dose aspirin (81 mg/day) after 12 weeks' gestation as a preventive measure for women at high risk for preeclampsia.  (B recommendation; moderate-certainty evidence of substantial net benefit).  As data on optimal risk prediction for preeclampsia are lacking, the USPSTF again offers a pragmatic approach to identifying at-risk individuals.  It recommends treatment for women with one or more high-risk factors or two or more moderate-risk factors.      High-risk factors:  History of preeclampsia, multifetal gestation, chronic hypertension, pregestational (type 1 or type 2) diabetes, kidney disease, or autoimmune disease (e.g., systemic lupus erythematous, antiphospholipid syndrome).      Moderate-risk factors:  Nulliparity, body-mass index >=30 kg/m2, family history (i.e., preeclampsia in mother or sister), age >=35, in vitro conception, personal history factors (i.e., previous adverse pregnancy outcome or intrauterine growth restriction >=10-year pregnancy interval), lower socioeconomic status, or Black race (due to a confluence of environmental, social and historical inequities rather than biology).      Although the 2014 USPSTF recommendations are unchanged, pooling of additional data for the meta-analysis strengthens evidence of safety as well as  efficacy (lower risk for preeclampsia,  birth and fetal growth restriction) of low-dose aspirin.  Lastly, the meta-analysis further validates the association between low-dose aspirin and lower  mortality (relative risk reduction, 21%).      I recommend more frequent monitoring for signs and symptoms of severe hypertension or preeclampsia than that recommended for normal pregnancy. This monitoring may include more frequent prenatal visits, home blood pressure monitoring or nursing contacts. For patients with a prior pregnancy complicated by preeclampsia with fetal growth restriction, I recommend serial ultrasound evaluation of fetal growth and amniotic fluid volume. The development of severe gestational hypertension, fetal growth restriction or recurrent preeclampsia requires maternal hospitalization.     (from Riki and Kenyon, Obstet Gynecol 2008;112:359-72; Johnny JT et al.  ERASMO 2021 Sep 28; 326:1192.)      Orders:  -     [facility]; Future    2. Previous  delivery affecting pregnancy  -     [facility] [place]; Future    3. Pregnancy, unspecified gestational age  -     [facility]; Future         Follow Up  Return in about 11 weeks (around 2025).    I spent 15 minutes caring for the patient on the day of service. This included: obtaining or reviewing a separately obtained medical history, reviewing patient records, performing a medically appropriate exam and/or evaluation, counseling or educating the patient/family/caregiver, ordering medications, labs, and/or procedures and documenting such in the medical record. This does not include time spent on review and interpretation of other tests such as fetal ultrasound or the performance of other procedures such as amniocentesis or CVS.      Douglas A. Milligan, MD  Maternal Fetal Medicine, [facility] [place]     2024

## 2024-12-05 NOTE — PROGRESS NOTES
"Documentation of the ultrasound findings, images, and interpretations will be available in the patient's Viewpoint report which is located in the imaging tab in chart review.    Maternal/Fetal Medicine Consult Note     Name: More Gaxiola    : 2000     MRN: [id number]     Referring Provider: Eli Rodriguez MD    Chief Complaint  Hx C/S x 2, HX GHTN     Subjective     History of Present Illness:  More Gaxiola is a 24 y.o.  21w1d who presents today for prior GHTN    CLARIBEL: Estimated Date of Delivery: 25     ROS:   As noted in HPI.     Past Medical History:   Diagnosis Date    Depression     Postpartum - not currently a problem    Gestational hypertension     Seasonal allergies       Past Surgical History:   Procedure Laterality Date     SECTION N/A 7/3/2019    Procedure: Primary LTCS (2 layer closure);  Surgeon: Everardo South MD;  Location:  "[facility] LABOR DELIVERY;  Service: Obstetrics/Gynecology     SECTION N/A 2021    Procedure:  SECTION REPEAT;  Surgeon: Ariel Benitez MD;  Location:  "[facility] LABOR DELIVERY;  Service: Obstetrics/Gynecology;  Laterality: N/A;    FOREIGN BODY REMOVAL      glass removed from foot    WISDOM TOOTH EXTRACTION        OB History          3    Para   2    Term   2       0    AB   0    Living   2         SAB   0    IAB   0    Ectopic   0    Molar   0    Multiple   0    Live Births   2          Obstetric Comments   2019 - John  2021 - Ant               Objective     Vital Signs  /80   Wt 88.9 kg (196 lb)   LMP 07/10/2024   Estimated body mass index is 35.85 kg/m² as calculated from the following:    Height as of 21: 157.5 cm (62\").    Weight as of this encounter: 88.9 kg (196 lb).    Physical Exam    Ultrasound Impression:   See Viewpoint    Assessment and Plan     More Gaxiola is a 24 y.o.  21w1d who presents today for prior GHTN    Diagnoses and all " orders for this visit:    1. History of gestational hypertension (Primary)  Assessment & Plan:  Women with a history of previous preeclampsia are at increased risk of preeclampsia and other adverse pregnancy outcomes in subsequent pregnancies. The magnitude of this risk is dependent on gestational age at time of disease onset, severity of disease and the presence or absence of preexisting medical disorders. The objective in the management of these patients is to reduce risk factors by optimizing maternal health before conception and to detect obstetric complications as early as possible. This objective can be achieved by formulating a rational approach that includes preconception evaluation and counseling, early  care, frequent monitoring of maternal and fetal well-being and timely delivery.     Recent studies have confirmed that there is no single biomarker that can be clinically useful for the prediction of recurrent preeclampsia. Combinations of biomarkers and biophysical parameters appear promising but more data are needed to confirm this use in clinical practice.  Further, a committee opinion published by the American College of Obstetricians and Gynecologists in 2015 noted 1st trimester screening tests for preeclampsia have low positive predictive value and there are no data demonstrating that they lead of improved outcomes and are therefore not suggested. Supplementation with fish oil, calcium or vitamin C and E and the use of antihypertensives have been shown to be ineffective in the prevention of recurrent preeclampsia and are not recommended.     In 2014, the USPSTF (U.S. Preventive Services Task Force) recommended low-dose aspirin after 12 weeks' gestation for pregnant women at high risk for preeclampsia (Vikki Intern Med 2014; 161:819).  In a  update, researchers incorporated data from several newer trials, including the large, multicenter ASPRE trial (N Engl J Med 2017; 377:613).    The Task Force continues to recommend daily low-dose aspirin (81 mg/day) after 12 weeks' gestation as a preventive measure for women at high risk for preeclampsia.  (B recommendation; moderate-certainty evidence of substantial net benefit).  As data on optimal risk prediction for preeclampsia are lacking, the USPSTF again offers a pragmatic approach to identifying at-risk individuals.  It recommends treatment for women with one or more high-risk factors or two or more moderate-risk factors.      High-risk factors:  History of preeclampsia, multifetal gestation, chronic hypertension, pregestational (type 1 or type 2) diabetes, kidney disease, or autoimmune disease (e.g., systemic lupus erythematous, antiphospholipid syndrome).      Moderate-risk factors:  Nulliparity, body-mass index >=30 kg/m2, family history (i.e., preeclampsia in mother or sister), age >=35, in vitro conception, personal history factors (i.e., previous adverse pregnancy outcome or intrauterine growth restriction >=10-year pregnancy interval), lower socioeconomic status, or Black race (due to a confluence of environmental, social and historical inequities rather than biology).      Although the 2014 USPSTF recommendations are unchanged, pooling of additional data for the meta-analysis strengthens evidence of safety as well as efficacy (lower risk for preeclampsia,  birth and fetal growth restriction) of low-dose aspirin.  Lastly, the meta-analysis further validates the association between low-dose aspirin and lower  mortality (relative risk reduction, 21%).      I recommend more frequent monitoring for signs and symptoms of severe hypertension or preeclampsia than that recommended for normal pregnancy. This monitoring may include more frequent prenatal visits, home blood pressure monitoring or nursing contacts. For patients with a prior pregnancy complicated by preeclampsia with fetal growth restriction, I recommend serial  ultrasound evaluation of fetal growth and amniotic fluid volume. The development of severe gestational hypertension, fetal growth restriction or recurrent preeclampsia requires maternal hospitalization.     (from Lyn and Kenyon, Obstet Gynecol 2008;112:359-72; Johnny SKINNERT et al.  ERASMO 2021 Sep 28; 326:1192.)      Orders:  -     [facility]; Future    2. Previous  delivery affecting pregnancy  -     [facility] [place]; Future    3. Pregnancy, unspecified gestational age  -     [facility] [place]; Future         Follow Up  Return in about 11 weeks (around 2025).    I spent 15 minutes caring for the patient on the day of service. This included: obtaining or reviewing a separately obtained medical history, reviewing patient records, performing a medically appropriate exam and/or evaluation, counseling or educating the patient/family/caregiver, ordering medications, labs, and/or procedures and documenting such in the medical record. This does not include time spent on review and interpretation of other tests such as fetal ultrasound or the performance of other procedures such as amniocentesis or CVS.      Douglas A. Milligan, MD  Maternal Fetal Medicine, [facility] [place]     2024

## 2024-12-06 NOTE — PROGRESS NOTES
Chief Complaint   Patient presents with    Routine Prenatal Visit    Pregnancy Ultrasound     Ultrasound at [facility] today.       HPI: More is a  currently at 21w1d who today reports the following:  Contractions - No; Leaking - No; Vaginal bleeding -  No; Heartburn - No.    ROS:  GI: Nausea - No ; Constipation - No; Diarrhea - No    Neuro: Headache - No ; Visual change - No      EXAM:  Vitals: See prenatal flowsheet   Abdomen: See prenatal flowsheet   Urine glucose/protein: See prenatal flowsheet   Pelvic: See prenatal flowsheet     Lab Results   Component Value Date    ABO O 10/10/2024    RH Positive 10/10/2024    ABSCRN Negative 10/10/2024       MDM:  Impression: Supervision of high risk pregnancy  Previous C/S with route of delivery to be determined  History of gestational HTN   Tests done today: Urine dip for protein and glucose  U/S for anatomic screening - anatomy completely seen today   Topics discussed: Continue with PNV's  Prenatal labs reviewed  Recommend starting ASA 81 mg daily   We reviewed r/b/a of TOLAC. Reviewed I would consider repeat c/s given history of 2 prior  sections and G1 for FTP. She desires to think about her options more. We did review best chance of success is spontaneous labor and I would recommend against IOL in her setting.   Influenza vaccination today.    Tests scheduled today for her next visit:   none     New Medications Ordered This Visit   Medications    aspirin 81 MG EC tablet     Sig: Take 1 tablet by mouth Daily.     Dispense:  30 tablet     Refill:  11

## 2024-12-17 ENCOUNTER — PATIENT OUTREACH (OUTPATIENT)
Dept: LABOR AND DELIVERY | Facility: HOSPITAL | Age: 24
End: 2024-12-17
Payer: COMMERCIAL

## 2024-12-17 NOTE — OUTREACH NOTE
[facility]  Unable to Reach    Questions/Answers      Flowsheet Row Responses   Pending Outreach Confirm Patient Interest   Call Attempt Second   Outcome Missing or invalid number   Next Call Attempt Date 12/20/24   Unable to reach comments: Called was answered, no one spoke or aknowledged MNN greeting. Will attempt contact later this week.            Ca Joyner RN  Maternity Nurse Navigator    12/17/2024, 13:08 EST    
subacute rehab at [place]

## 2024-12-20 ENCOUNTER — PATIENT OUTREACH (OUTPATIENT)
Dept: LABOR AND DELIVERY | Facility: HOSPITAL | Age: 24
End: 2024-12-20
Payer: COMMERCIAL

## 2024-12-20 NOTE — OUTREACH NOTE
[facility]  Unable to Reach    Questions/Answers      Flowsheet Row Responses   Pending Outreach Confirm Patient Interest   Call Attempt Third   Outcome No answer/busy   Unable to reach comments: Called was answered, no one spoke or aknowledged MNN greeting.          After three attempts to reach More to discuss the [facility] program were unsuccessful, will decline from program. Unable to provide basic pregnancy resources as More does not have a [employer] account.     Ca Joyner RN  Maternity Nurse Navigator    12/20/2024, 16:10 EST

## 2024-12-26 ENCOUNTER — ROUTINE PRENATAL (OUTPATIENT)
Dept: OBSTETRICS AND GYNECOLOGY | Facility: CLINIC | Age: 24
End: 2024-12-26
Payer: COMMERCIAL

## 2024-12-26 VITALS — BODY MASS INDEX: 35.67 KG/M2 | DIASTOLIC BLOOD PRESSURE: 72 MMHG | SYSTOLIC BLOOD PRESSURE: 118 MMHG | WEIGHT: 195 LBS

## 2024-12-26 DIAGNOSIS — Z34.82 PRENATAL CARE, SUBSEQUENT PREGNANCY IN SECOND TRIMESTER: Primary | ICD-10-CM

## 2024-12-26 DIAGNOSIS — O34.219 PREVIOUS CESAREAN DELIVERY AFFECTING PREGNANCY: ICD-10-CM

## 2024-12-26 DIAGNOSIS — Z87.59 HISTORY OF GESTATIONAL HYPERTENSION: ICD-10-CM

## 2024-12-26 NOTE — PROGRESS NOTES
Chief Complaint   Patient presents with    Routine Prenatal Visit       HPI: More is a  currently at 24w0d who today reports the following:  Contractions - No; Leaking - No; Vaginal bleeding -  No; Heartburn - No.    ROS:  GI: Nausea - No ; Constipation - No; Diarrhea - No    Neuro: Headache - No ; Visual change - No      EXAM:  Vitals: See prenatal flowsheet   Abdomen: See prenatal flowsheet   Urine glucose/protein: See prenatal flowsheet   Pelvic: See prenatal flowsheet     Lab Results   Component Value Date    ABO O 10/10/2024    RH Positive 10/10/2024    ABSCRN Negative 10/10/2024       MDM:  Impression: Supervision of high risk pregnancy  Previous C/S with plan for repeat c/s  History of gestational HTN   Tests done today: Urine dip for protein and glucose   Topics discussed: Continue with PNV's  Prenatal labs reviewed   labor signs and symptoms  Symptoms of preeclampsia  TDAP vaccination recommended between 27-36 weeks gestation OR after birth  Schedule repeat  section at 39 weeks   RSV vaccination at ~ 32-36 weeks    Tests scheduled today for her next visit:   Glucola and labs      Orders Placed This Encounter   Procedures    Glucose, Post 50 Gm Glucola     Standing Status:   Future     Standing Expiration Date:   2025     Order Specific Question:   Release to patient     Answer:   Routine Release [[id number]]    CBC (No Diff)     Standing Status:   Future     Standing Expiration Date:   2025     Order Specific Question:   Release to patient     Answer:   Routine Release [[id number]]    RPR Qualitative with Reflex to Quant     Standing Status:   Future     Standing Expiration Date:   2025     Order Specific Question:   Release to patient     Answer:   Routine Release [[id number]]

## 2025-01-22 ENCOUNTER — ROUTINE PRENATAL (OUTPATIENT)
Dept: OBSTETRICS AND GYNECOLOGY | Facility: CLINIC | Age: 25
End: 2025-01-22
Payer: COMMERCIAL

## 2025-01-22 ENCOUNTER — LAB (OUTPATIENT)
Dept: LAB | Facility: HOSPITAL | Age: 25
End: 2025-01-22
Payer: COMMERCIAL

## 2025-01-22 VITALS — WEIGHT: 198 LBS | SYSTOLIC BLOOD PRESSURE: 122 MMHG | DIASTOLIC BLOOD PRESSURE: 80 MMHG | BODY MASS INDEX: 36.21 KG/M2

## 2025-01-22 DIAGNOSIS — Z34.82 PRENATAL CARE, SUBSEQUENT PREGNANCY IN SECOND TRIMESTER: ICD-10-CM

## 2025-01-22 DIAGNOSIS — O34.219 PREVIOUS CESAREAN DELIVERY AFFECTING PREGNANCY: ICD-10-CM

## 2025-01-22 DIAGNOSIS — Z87.59 HISTORY OF GESTATIONAL HYPERTENSION: ICD-10-CM

## 2025-01-22 DIAGNOSIS — Z34.82 PRENATAL CARE, SUBSEQUENT PREGNANCY, SECOND TRIMESTER: Primary | ICD-10-CM

## 2025-01-22 LAB
DEPRECATED RDW RBC AUTO: 38.4 FL (ref 37–54)
ERYTHROCYTE [DISTWIDTH] IN BLOOD BY AUTOMATED COUNT: 12.6 % (ref 12.3–15.4)
GLUCOSE 1H P 100 G GLC PO SERPL-MCNC: 155 MG/DL (ref 65–139)
HCT VFR BLD AUTO: 36.4 % (ref 34–46.6)
HGB BLD-MCNC: 11.7 G/DL (ref 12–15.9)
MCH RBC QN AUTO: 26.8 PG (ref 26.6–33)
MCHC RBC AUTO-ENTMCNC: 32.1 G/DL (ref 31.5–35.7)
MCV RBC AUTO: 83.5 FL (ref 79–97)
PLATELET # BLD AUTO: 286 10*3/MM3 (ref 140–450)
PMV BLD AUTO: 10.5 FL (ref 6–12)
RBC # BLD AUTO: 4.36 10*6/MM3 (ref 3.77–5.28)
WBC NRBC COR # BLD AUTO: 12.94 10*3/MM3 (ref 3.4–10.8)

## 2025-01-22 PROCEDURE — 86592 SYPHILIS TEST NON-TREP QUAL: CPT

## 2025-01-22 PROCEDURE — 82950 GLUCOSE TEST: CPT

## 2025-01-22 PROCEDURE — 85027 COMPLETE CBC AUTOMATED: CPT

## 2025-01-22 PROCEDURE — 82948 REAGENT STRIP/BLOOD GLUCOSE: CPT

## 2025-01-22 NOTE — PROGRESS NOTES
Chief Complaint   Patient presents with    Routine Prenatal Visit       HPI  More is a  currently at 27w6d who today reports the following:  Contractions - No; Leaking - No; Vaginal bleeding -  No; Heartburn - No.  She reports good fetal movement. She is headed to the lab after this appointment for third trimester screening.     Review of Systems   Constitutional: Negative.    Gastrointestinal: Negative.    Neurological: Negative.    Psychiatric/Behavioral: Negative.         Objective  /80   Wt 89.8 kg (198 lb)   LMP 2024   BMI 36.21 kg/m²     Physical Exam  Vitals and nursing note reviewed.   Psychiatric:         Mood and Affect: Mood normal.         Behavior: Behavior normal.         Thought Content: Thought content normal.         Judgment: Judgment normal.         Lab Results   Component Value Date    ABO O 10/10/2024    RH Positive 10/10/2024    ABSCRN Negative 10/10/2024       Assessment and Plan  Diagnoses and all orders for this visit:    1. Prenatal care, subsequent pregnancy in second trimester (Primary)    2. Previous  delivery affecting pregnancy    3. History of gestational hypertension        Continue with PNV's  Prenatal labs reviewed  TDAP vaccination recommended between 27-36 weeks gestation OR after birth  Tests scheduled today for her next visit none    Return for Next scheduled follow up.    Margaret Lopez, PRITI  2025

## 2025-01-23 LAB — RPR SER QL: NORMAL

## 2025-01-24 DIAGNOSIS — O99.810 ABNORMAL O'SULLIVAN GLUCOSE CHALLENGE TEST, ANTEPARTUM: Primary | ICD-10-CM

## 2025-02-03 ENCOUNTER — LAB (OUTPATIENT)
Dept: LAB | Facility: HOSPITAL | Age: 25
End: 2025-02-03
Payer: COMMERCIAL

## 2025-02-03 DIAGNOSIS — O99.810 ABNORMAL O'SULLIVAN GLUCOSE CHALLENGE TEST, ANTEPARTUM: Primary | ICD-10-CM

## 2025-02-03 LAB
GLUCOSE P FAST SERPL-MCNC: 91 MG/DL (ref 65–94)
GTT GEST 2H PNL UR+SERPL: 152 MG/DL (ref 65–179)
GTT GEST 3H PNL SERPL: 129 MG/DL (ref 65–139)
GTT GEST 3H PNL SERPL: 141 MG/DL (ref 65–154)

## 2025-02-03 PROCEDURE — 82948 REAGENT STRIP/BLOOD GLUCOSE: CPT

## 2025-02-03 PROCEDURE — 82951 GLUCOSE TOLERANCE TEST (GTT): CPT

## 2025-02-03 PROCEDURE — 36415 COLL VENOUS BLD VENIPUNCTURE: CPT

## 2025-02-03 PROCEDURE — 82952 GTT-ADDED SAMPLES: CPT

## 2025-02-04 PROBLEM — Z34.83 PRENATAL CARE, SUBSEQUENT PREGNANCY IN THIRD TRIMESTER: Status: ACTIVE | Noted: 2024-10-10

## 2025-02-05 ENCOUNTER — ROUTINE PRENATAL (OUTPATIENT)
Dept: OBSTETRICS AND GYNECOLOGY | Facility: CLINIC | Age: 25
End: 2025-02-05
Payer: COMMERCIAL

## 2025-02-05 VITALS — SYSTOLIC BLOOD PRESSURE: 128 MMHG | DIASTOLIC BLOOD PRESSURE: 84 MMHG | BODY MASS INDEX: 35.92 KG/M2 | WEIGHT: 196.4 LBS

## 2025-02-05 DIAGNOSIS — O34.219 PREVIOUS CESAREAN DELIVERY AFFECTING PREGNANCY: ICD-10-CM

## 2025-02-05 DIAGNOSIS — Z87.59 HISTORY OF GESTATIONAL HYPERTENSION: ICD-10-CM

## 2025-02-05 DIAGNOSIS — O99.810 ABNORMAL O'SULLIVAN GLUCOSE CHALLENGE TEST, ANTEPARTUM: ICD-10-CM

## 2025-02-05 DIAGNOSIS — Z34.83 PRENATAL CARE, SUBSEQUENT PREGNANCY IN THIRD TRIMESTER: Primary | ICD-10-CM

## 2025-02-05 RX ORDER — ASPIRIN 81 MG/1
81 TABLET ORAL DAILY
Qty: 30 TABLET | Refills: 11 | Status: SHIPPED | OUTPATIENT
Start: 2025-02-05

## 2025-02-05 NOTE — PROGRESS NOTES
Chief Complaint   Patient presents with    Routine Prenatal Visit       HPI: More is a  currently at 29w6d who today reports the following:  Contractions - No; Leaking - No; Vaginal bleeding -  No; Heartburn - No.    ROS:  GI: Nausea - No ; Constipation - No; Diarrhea - No    Neuro: Headache - No ; Visual change - No      EXAM:  Vitals: See prenatal flowsheet   Abdomen: See prenatal flowsheet   Urine glucose/protein: See prenatal flowsheet   Pelvic: See prenatal flowsheet     Lab Results   Component Value Date    HGB 11.7 (L) 2025    HCT 36.4 2025     (H) 2025    ABO O 10/10/2024    RH Positive 10/10/2024    ABSCRN Negative 10/10/2024       MDM:  Impression: Supervision of high risk pregnancy  Previous C/S with plan for repeat c/s on 25 at 0730am  History of gestational HTN  Elevated 1 hour - normal 3 hour GTT   Tests done today: Urine dip for protein and glucose   Topics discussed: Continue with PNV's  Prenatal labs reviewed   labor signs and symptoms  Symptoms of preeclampsia  Fetal movements counts  Reviewed 3 hour GTT results    Tests scheduled today for her next visit:   U/S for [facility] follow up

## 2025-02-20 ENCOUNTER — ROUTINE PRENATAL (OUTPATIENT)
Dept: OBSTETRICS AND GYNECOLOGY | Facility: CLINIC | Age: 25
End: 2025-02-20
Payer: COMMERCIAL

## 2025-02-20 ENCOUNTER — HOSPITAL ENCOUNTER (OUTPATIENT)
Dept: WOMENS IMAGING | Facility: HOSPITAL | Age: 25
Discharge: HOME | End: 2025-02-20
Admitting: OBSTETRICS & GYNECOLOGY
Payer: COMMERCIAL

## 2025-02-20 ENCOUNTER — OFFICE VISIT (OUTPATIENT)
Dept: OBSTETRICS AND GYNECOLOGY | Facility: HOSPITAL | Age: 25
End: 2025-02-20
Payer: COMMERCIAL

## 2025-02-20 VITALS — WEIGHT: 197 LBS | SYSTOLIC BLOOD PRESSURE: 127 MMHG | DIASTOLIC BLOOD PRESSURE: 78 MMHG | BODY MASS INDEX: 36.03 KG/M2

## 2025-02-20 VITALS — BODY MASS INDEX: 36.03 KG/M2 | WEIGHT: 197 LBS

## 2025-02-20 DIAGNOSIS — O34.219 PREVIOUS CESAREAN DELIVERY AFFECTING PREGNANCY: ICD-10-CM

## 2025-02-20 DIAGNOSIS — Z87.59 HISTORY OF GESTATIONAL HYPERTENSION: ICD-10-CM

## 2025-02-20 DIAGNOSIS — O99.810 ABNORMAL O'SULLIVAN GLUCOSE CHALLENGE TEST, ANTEPARTUM: ICD-10-CM

## 2025-02-20 DIAGNOSIS — Z34.83 PRENATAL CARE, SUBSEQUENT PREGNANCY IN THIRD TRIMESTER: Primary | ICD-10-CM

## 2025-02-20 DIAGNOSIS — Z34.90 PREGNANCY, UNSPECIFIED GESTATIONAL AGE: ICD-10-CM

## 2025-02-20 DIAGNOSIS — Z87.59 HISTORY OF GESTATIONAL HYPERTENSION: Primary | ICD-10-CM

## 2025-02-20 PROCEDURE — 76816 OB US FOLLOW-UP PER FETUS: CPT

## 2025-02-20 PROCEDURE — 76819 FETAL BIOPHYS PROFIL W/O NST: CPT

## 2025-02-20 RX ORDER — CHLORHEXIDINE GLUCONATE ORAL RINSE 1.2 MG/ML
SOLUTION DENTAL 2 TIMES DAILY
COMMUNITY
Start: 2025-02-12

## 2025-02-20 NOTE — PROGRESS NOTES
Patient denies any leaking fluid, vaginal bleeding, or contractions.  NIPT declined.  Patient reports next follow-up appointment with Dr. Rodriguez's office is today.

## 2025-02-20 NOTE — PROGRESS NOTES
Chief Complaint   Patient presents with    Routine Prenatal Visit     US done today at [facility]        HPI: More is a  currently at 32w0d who today reports the following:  Contractions - No; Leaking - No; Vaginal bleeding -  No; Swelling of extremities - No.    ROS:  GI: Nausea - No; Constipation - No; Diarrhea - No    Neuro: Headache - No; Visual change - No      EXAM:  Vitals: See prenatal flowsheet   Abdomen: See prenatal flowsheet   Urine glucose/protein: See prenatal flowsheet   Pelvic: See prenatal flowsheet   MDM:   Impression: Supervision of high risk pregnancy  Previous C/S with plan for repeat c/s on 25 at 0730am  History of gestational HTN  Elevated 1 hour - normal 3 hour GTT   Tests done today: Urine dip for protein and glucose  U/S for EFW - at [facility] - S=D   Topics discussed: Continue with PNV's  Prenatal labs reviewed   labor signs and symptoms  Symptoms of preeclampsia   Tests scheduled today for her next visit:   none

## 2025-03-05 ENCOUNTER — ROUTINE PRENATAL (OUTPATIENT)
Dept: OBSTETRICS AND GYNECOLOGY | Facility: CLINIC | Age: 25
End: 2025-03-05
Payer: COMMERCIAL

## 2025-03-05 VITALS — SYSTOLIC BLOOD PRESSURE: 114 MMHG | WEIGHT: 199.4 LBS | BODY MASS INDEX: 36.47 KG/M2 | DIASTOLIC BLOOD PRESSURE: 76 MMHG

## 2025-03-05 DIAGNOSIS — Z34.83 PRENATAL CARE, SUBSEQUENT PREGNANCY IN THIRD TRIMESTER: Primary | ICD-10-CM

## 2025-03-05 DIAGNOSIS — O34.219 PREVIOUS CESAREAN DELIVERY AFFECTING PREGNANCY: ICD-10-CM

## 2025-03-05 DIAGNOSIS — Z87.59 HISTORY OF GESTATIONAL HYPERTENSION: ICD-10-CM

## 2025-03-05 DIAGNOSIS — O99.810 ABNORMAL O'SULLIVAN GLUCOSE CHALLENGE TEST, ANTEPARTUM: ICD-10-CM

## 2025-03-05 LAB
GLUCOSE UR STRIP-MCNC: NEGATIVE MG/DL
LEUKOCYTE EST, POC: ABNORMAL
PROT UR STRIP-MCNC: ABNORMAL MG/DL

## 2025-03-05 NOTE — PROGRESS NOTES
Chief Complaint   Patient presents with    Routine Prenatal Visit     No c/c       HPI: More is a  currently at 33w6d who today reports the following:  Contractions - No; Leaking - No; Vaginal bleeding -  No; Swelling of extremities - No.    ROS:  GI: Nausea - No; Constipation - No; Diarrhea - No    Neuro: Headache - No; Visual change - No      EXAM:  Vitals: See prenatal flowsheet   Abdomen: See prenatal flowsheet   Urine glucose/protein: See prenatal flowsheet   Pelvic: See prenatal flowsheet   MDM:   Impression: Supervision of high risk pregnancy  Previous C/S with plan for repeat c/s on 25 at 0730am - plans to TOLAC if labor occurs prior to this  History of gestational HTN  Elevated 1 hour - normal 3 hour GTT   Tests done today: Urine dip for protein and glucose   Topics discussed: Continue with PNV's  Prenatal labs reviewed   labor signs and symptoms  Symptoms of preeclampsia  Breast feeding   Fetal movements    Tests scheduled today for her next visit:   GBS testing

## 2025-03-19 ENCOUNTER — ROUTINE PRENATAL (OUTPATIENT)
Dept: OBSTETRICS AND GYNECOLOGY | Facility: CLINIC | Age: 25
End: 2025-03-19
Payer: COMMERCIAL

## 2025-03-19 VITALS — SYSTOLIC BLOOD PRESSURE: 120 MMHG | DIASTOLIC BLOOD PRESSURE: 88 MMHG | BODY MASS INDEX: 36.58 KG/M2 | WEIGHT: 200 LBS

## 2025-03-19 DIAGNOSIS — O34.219 PREVIOUS CESAREAN DELIVERY AFFECTING PREGNANCY: ICD-10-CM

## 2025-03-19 DIAGNOSIS — O99.810 ABNORMAL O'SULLIVAN GLUCOSE CHALLENGE TEST, ANTEPARTUM: ICD-10-CM

## 2025-03-19 DIAGNOSIS — Z87.59 HISTORY OF GESTATIONAL HYPERTENSION: ICD-10-CM

## 2025-03-19 DIAGNOSIS — Z36.85 ANTENATAL SCREENING FOR STREPTOCOCCUS B: ICD-10-CM

## 2025-03-19 DIAGNOSIS — Z34.83 PRENATAL CARE, SUBSEQUENT PREGNANCY IN THIRD TRIMESTER: Primary | ICD-10-CM

## 2025-03-19 LAB — GP B STREP RRNA SPEC QL PROBE: NEGATIVE

## 2025-03-19 NOTE — PROGRESS NOTES
Chief Complaint   Patient presents with    Routine Prenatal Visit     GBS today       HPI: More is a  currently at 35w6d who today reports the following:  Contractions - No; Leaking - No; Vaginal bleeding -  No; Swelling of extremities - No.    ROS:  GI: Nausea - No; Constipation - No; Diarrhea - No    Neuro: Headache - No; Visual change - No      EXAM:  Vitals: See prenatal flowsheet   Abdomen: See prenatal flowsheet   Urine glucose/protein: See prenatal flowsheet   Pelvic: See prenatal flowsheet   MDM:   Impression: Supervision of high risk pregnancy  Previous C/S with plan for repeat c/s on 25 at 0730am - plans to TOLAC if labor occurs prior to this  History of gestational HTN  Elevated 1 hour - normal 3 hour GTT   Tests done today: Urine dip for protein and glucose  GBS testing   Topics discussed: Continue with PNV's  Prenatal labs reviewed   labor signs and symptoms  Symptoms of preeclampsia   Tests scheduled today for her next visit:   U/S for EFW and position      Orders Placed This Encounter   Procedures    Strep B Screen - Swab, Vaginal/Rectum     MDL test # 127     Standing Status:   Future     Expected Date:   3/19/2025     Expiration Date:   2025     Release to patient:   Routine Release [[id number]]     Ob Follow Up Transabdominal Approach     Standing Status:   Future     Expected Date:   3/29/2025     Expiration Date:   3/19/2026     Reason for Exam::   check EFW, history of c/s, check position     Release to patient:   Routine Release [[id number]]

## 2025-03-25 ENCOUNTER — DOCUMENTATION (OUTPATIENT)
Dept: OBSTETRICS AND GYNECOLOGY | Facility: CLINIC | Age: 25
End: 2025-03-25
Payer: COMMERCIAL

## 2025-03-26 ENCOUNTER — ROUTINE PRENATAL (OUTPATIENT)
Dept: OBSTETRICS AND GYNECOLOGY | Facility: CLINIC | Age: 25
End: 2025-03-26
Payer: COMMERCIAL

## 2025-03-26 ENCOUNTER — LAB (OUTPATIENT)
Dept: LAB | Facility: HOSPITAL | Age: 25
End: 2025-03-26
Payer: COMMERCIAL

## 2025-03-26 VITALS — WEIGHT: 201 LBS | SYSTOLIC BLOOD PRESSURE: 138 MMHG | DIASTOLIC BLOOD PRESSURE: 90 MMHG | BODY MASS INDEX: 36.76 KG/M2

## 2025-03-26 DIAGNOSIS — O34.219 PREVIOUS CESAREAN DELIVERY AFFECTING PREGNANCY: ICD-10-CM

## 2025-03-26 DIAGNOSIS — O99.810 ABNORMAL O'SULLIVAN GLUCOSE CHALLENGE TEST, ANTEPARTUM: ICD-10-CM

## 2025-03-26 DIAGNOSIS — O16.3 ELEVATED BLOOD PRESSURE AFFECTING PREGNANCY IN THIRD TRIMESTER, ANTEPARTUM: ICD-10-CM

## 2025-03-26 DIAGNOSIS — Z34.83 PRENATAL CARE, SUBSEQUENT PREGNANCY IN THIRD TRIMESTER: Primary | ICD-10-CM

## 2025-03-26 DIAGNOSIS — Z34.83 PRENATAL CARE, SUBSEQUENT PREGNANCY IN THIRD TRIMESTER: ICD-10-CM

## 2025-03-26 DIAGNOSIS — Z36.89 NST (NON-STRESS TEST) REACTIVE: ICD-10-CM

## 2025-03-26 DIAGNOSIS — Z87.59 HISTORY OF GESTATIONAL HYPERTENSION: ICD-10-CM

## 2025-03-26 LAB
ALP SERPL-CCNC: 181 U/L (ref 39–117)
ALT SERPL W P-5'-P-CCNC: 10 U/L (ref 1–33)
AST SERPL-CCNC: 17 U/L (ref 1–32)
BILIRUB SERPL-MCNC: 0.4 MG/DL (ref 0–1.2)
CREAT SERPL-MCNC: 0.6 MG/DL (ref 0.57–1)
DEPRECATED RDW RBC AUTO: 36.2 FL (ref 37–54)
ERYTHROCYTE [DISTWIDTH] IN BLOOD BY AUTOMATED COUNT: 13.5 % (ref 12.3–15.4)
GLUCOSE UR STRIP-MCNC: NEGATIVE MG/DL
HCT VFR BLD AUTO: 34.8 % (ref 34–46.6)
HGB BLD-MCNC: 11 G/DL (ref 12–15.9)
LDH SERPL-CCNC: 198 U/L (ref 135–214)
MCH RBC QN AUTO: 24 PG (ref 26.6–33)
MCHC RBC AUTO-ENTMCNC: 31.6 G/DL (ref 31.5–35.7)
MCV RBC AUTO: 76 FL (ref 79–97)
PLATELET # BLD AUTO: 251 10*3/MM3 (ref 140–450)
PMV BLD AUTO: 10.1 FL (ref 6–12)
PROT UR STRIP-MCNC: NEGATIVE MG/DL
RBC # BLD AUTO: 4.58 10*6/MM3 (ref 3.77–5.28)
URATE SERPL-MCNC: 5.6 MG/DL (ref 2.4–5.7)
WBC NRBC COR # BLD AUTO: 8.93 10*3/MM3 (ref 3.4–10.8)

## 2025-03-26 PROCEDURE — 82247 BILIRUBIN TOTAL: CPT

## 2025-03-26 PROCEDURE — 84075 ASSAY ALKALINE PHOSPHATASE: CPT

## 2025-03-26 PROCEDURE — 82565 ASSAY OF CREATININE: CPT

## 2025-03-26 PROCEDURE — 84460 ALANINE AMINO (ALT) (SGPT): CPT

## 2025-03-26 PROCEDURE — 83615 LACTATE (LD) (LDH) ENZYME: CPT

## 2025-03-26 PROCEDURE — 84550 ASSAY OF BLOOD/URIC ACID: CPT

## 2025-03-26 PROCEDURE — 85027 COMPLETE CBC AUTOMATED: CPT

## 2025-03-26 PROCEDURE — 84450 TRANSFERASE (AST) (SGOT): CPT

## 2025-03-26 NOTE — PROGRESS NOTES
Chief Complaint   Patient presents with    Routine Prenatal Visit     US done today       HPI: More is a  currently at 36w6d who today reports the following:  Contractions - No; Leaking - No; Vaginal bleeding -  No; Swelling of extremities - No.    ROS:  GI: Nausea - No; Constipation - No; Diarrhea - No    Neuro: Headache - No; Visual change - No      EXAM:  Vitals: See prenatal flowsheet   Abdomen: See prenatal flowsheet   Urine glucose/protein: See prenatal flowsheet   Pelvic: See prenatal flowsheet   MDM:   Impression: Supervision of high risk pregnancy  Mild range BP - urine protein pending, repeat BP mild range, asymptomatic   Previous C/S with plan for repeat c/s on 25 at 0730am - plans to TOLAC if labor occurs prior to this  History of gestational HTN  Elevated 1 hour - normal 3 hour GTT   Tests done today: Urine dip for protein and glucose  U/S for EFW - 2,782 grams (30%), AC 27%, cephalic, posterior/fundal placenta, NITISH normal, fetal breathing seen,  bpm.  NST reactive   Topics discussed: Continue with PNV's  Prenatal labs reviewed   labor signs and symptoms  Symptoms of preeclampsia  Labs today and BP check tomorrow - NPO after MN in case    Tests scheduled today for her next visit:   none     Non Stress Test      Patient: More Gaxiola  : 2000  MRN: [id number]  CSN: [id number]  Date: 3/26/2025    Estimated Date of Delivery: 25  Gestational Age: 36w6d    Indication for NST 36 weeks, elevate BP  Total time > 20 minutes                   Interpretation    Baseline  beats per minute   Accelerations  Present   Decelerations Absent       Additional Comments Reactive       Recommendations for f/u See above       This note has been electronically signed.    Eli Rodriguez MD

## 2025-03-27 ENCOUNTER — ANESTHESIA (OUTPATIENT)
Dept: LABOR AND DELIVERY | Facility: HOSPITAL | Age: 25
End: 2025-03-27
Payer: COMMERCIAL

## 2025-03-27 ENCOUNTER — PREP FOR SURGERY (OUTPATIENT)
Dept: OTHER | Facility: HOSPITAL | Age: 25
End: 2025-03-27
Payer: COMMERCIAL

## 2025-03-27 ENCOUNTER — ROUTINE PRENATAL (OUTPATIENT)
Dept: OBSTETRICS AND GYNECOLOGY | Facility: CLINIC | Age: 25
End: 2025-03-27
Payer: COMMERCIAL

## 2025-03-27 ENCOUNTER — ANESTHESIA EVENT (OUTPATIENT)
Dept: LABOR AND DELIVERY | Facility: HOSPITAL | Age: 25
End: 2025-03-27
Payer: COMMERCIAL

## 2025-03-27 ENCOUNTER — HOSPITAL ENCOUNTER (INPATIENT)
Facility: HOSPITAL | Age: 25
LOS: 2 days | Discharge: HOME | End: 2025-03-29
Attending: OBSTETRICS & GYNECOLOGY | Admitting: OBSTETRICS & GYNECOLOGY
Payer: COMMERCIAL

## 2025-03-27 VITALS — BODY MASS INDEX: 36.76 KG/M2 | SYSTOLIC BLOOD PRESSURE: 130 MMHG | DIASTOLIC BLOOD PRESSURE: 98 MMHG | WEIGHT: 201 LBS

## 2025-03-27 DIAGNOSIS — Z34.83 PRENATAL CARE, SUBSEQUENT PREGNANCY IN THIRD TRIMESTER: Primary | ICD-10-CM

## 2025-03-27 DIAGNOSIS — Z87.59 HISTORY OF GESTATIONAL HYPERTENSION: ICD-10-CM

## 2025-03-27 DIAGNOSIS — O34.219 PREVIOUS CESAREAN DELIVERY AFFECTING PREGNANCY: ICD-10-CM

## 2025-03-27 DIAGNOSIS — O13.3 GESTATIONAL HYPERTENSION, THIRD TRIMESTER: ICD-10-CM

## 2025-03-27 PROBLEM — Z34.90 PREGNANCY: Status: RESOLVED | Noted: 2024-12-05 | Resolved: 2025-03-27

## 2025-03-27 PROBLEM — O99.810 ABNORMAL O'SULLIVAN GLUCOSE CHALLENGE TEST, ANTEPARTUM: Status: RESOLVED | Noted: 2025-01-24 | Resolved: 2025-03-27

## 2025-03-27 LAB
ABO GROUP BLD: NORMAL
ALBUMIN SERPL-MCNC: 3.7 G/DL (ref 3.5–5.2)
ALBUMIN/GLOB SERPL: 1.2 G/DL
ALP SERPL-CCNC: 182 U/L (ref 39–117)
ALT SERPL W P-5'-P-CCNC: 10 U/L (ref 1–33)
ANION GAP SERPL CALCULATED.3IONS-SCNC: 15 MMOL/L (ref 5–15)
AST SERPL-CCNC: 24 U/L (ref 1–32)
BILIRUB SERPL-MCNC: 0.5 MG/DL (ref 0–1.2)
BLD GP AB SCN SERPL QL: NEGATIVE
BUN SERPL-MCNC: 7 MG/DL (ref 6–20)
BUN/CREAT SERPL: 11.7 (ref 7–25)
CALCIUM SPEC-SCNC: 8.8 MG/DL (ref 8.6–10.5)
CHLORIDE SERPL-SCNC: 105 MMOL/L (ref 98–107)
CO2 SERPL-SCNC: 21 MMOL/L (ref 22–29)
CREAT SERPL-MCNC: 0.6 MG/DL (ref 0.57–1)
CREAT UR-MCNC: 228.1 MG/DL
DEPRECATED RDW RBC AUTO: 36.2 FL (ref 37–54)
EGFRCR SERPLBLD CKD-EPI 2021: 127.1 ML/MIN/1.73
ERYTHROCYTE [DISTWIDTH] IN BLOOD BY AUTOMATED COUNT: 13.5 % (ref 12.3–15.4)
GLOBULIN UR ELPH-MCNC: 3.2 GM/DL
GLUCOSE BLDC GLUCOMTR-MCNC: 73 MG/DL (ref 70–130)
GLUCOSE SERPL-MCNC: 62 MG/DL (ref 65–99)
HCT VFR BLD AUTO: 33.7 % (ref 34–46.6)
HGB BLD-MCNC: 10.6 G/DL (ref 12–15.9)
LDH SERPL-CCNC: 238 U/L (ref 135–214)
MCH RBC QN AUTO: 23.6 PG (ref 26.6–33)
MCHC RBC AUTO-ENTMCNC: 31.5 G/DL (ref 31.5–35.7)
MCV RBC AUTO: 75.1 FL (ref 79–97)
PLATELET # BLD AUTO: 243 10*3/MM3 (ref 140–450)
PMV BLD AUTO: 10.9 FL (ref 6–12)
POTASSIUM SERPL-SCNC: 4.5 MMOL/L (ref 3.5–5.2)
PROT ?TM UR-MCNC: 24.4 MG/DL
PROT SERPL-MCNC: 6.9 G/DL (ref 6–8.5)
PROT/CREAT UR: 107 MG/G CREA (ref 0–200)
RBC # BLD AUTO: 4.49 10*6/MM3 (ref 3.77–5.28)
RH BLD: POSITIVE
SODIUM SERPL-SCNC: 141 MMOL/L (ref 136–145)
T&S EXPIRATION DATE: NORMAL
TREPONEMA PALLIDUM IGG+IGM AB [PRESENCE] IN SERUM OR PLASMA BY IMMUNOASSAY: NORMAL
URATE SERPL-MCNC: 6 MG/DL (ref 2.4–5.7)
WBC NRBC COR # BLD AUTO: 9.21 10*3/MM3 (ref 3.4–10.8)

## 2025-03-27 PROCEDURE — 25010000002 KETOROLAC TROMETHAMINE PER 15 MG: Performed by: OBSTETRICS & GYNECOLOGY

## 2025-03-27 PROCEDURE — 25010000002 MORPHINE PER 10 MG: Performed by: ANESTHESIOLOGY

## 2025-03-27 PROCEDURE — 83615 LACTATE (LD) (LDH) ENZYME: CPT | Performed by: OBSTETRICS & GYNECOLOGY

## 2025-03-27 PROCEDURE — 25010000002 ONDANSETRON PER 1 MG: Performed by: ANESTHESIOLOGY

## 2025-03-27 PROCEDURE — 25010000002 OXYTOCIN PER 10 UNITS: Performed by: ANESTHESIOLOGY

## 2025-03-27 PROCEDURE — 59025 FETAL NON-STRESS TEST: CPT

## 2025-03-27 PROCEDURE — 82948 REAGENT STRIP/BLOOD GLUCOSE: CPT

## 2025-03-27 PROCEDURE — 25010000002 METOCLOPRAMIDE PER 10 MG: Performed by: ANESTHESIOLOGY

## 2025-03-27 PROCEDURE — 84550 ASSAY OF BLOOD/URIC ACID: CPT | Performed by: OBSTETRICS & GYNECOLOGY

## 2025-03-27 PROCEDURE — 25010000002 BUPIVACAINE IN DEXTROSE 0.75-8.25 % SOLUTION: Performed by: ANESTHESIOLOGY

## 2025-03-27 PROCEDURE — 82570 ASSAY OF URINE CREATININE: CPT | Performed by: OBSTETRICS & GYNECOLOGY

## 2025-03-27 PROCEDURE — 25010000002 CEFAZOLIN PER 500 MG: Performed by: OBSTETRICS & GYNECOLOGY

## 2025-03-27 PROCEDURE — 63710000001 ONDANSETRON ODT 4 MG TABLET DISPERSIBLE: Performed by: OBSTETRICS & GYNECOLOGY

## 2025-03-27 PROCEDURE — 84156 ASSAY OF PROTEIN URINE: CPT | Performed by: OBSTETRICS & GYNECOLOGY

## 2025-03-27 PROCEDURE — 86901 BLOOD TYPING SEROLOGIC RH(D): CPT | Performed by: OBSTETRICS & GYNECOLOGY

## 2025-03-27 PROCEDURE — 85027 COMPLETE CBC AUTOMATED: CPT | Performed by: OBSTETRICS & GYNECOLOGY

## 2025-03-27 PROCEDURE — 25810000003 LACTATED RINGERS PER 1000 ML: Performed by: ANESTHESIOLOGY

## 2025-03-27 PROCEDURE — 86900 BLOOD TYPING SEROLOGIC ABO: CPT | Performed by: OBSTETRICS & GYNECOLOGY

## 2025-03-27 PROCEDURE — 86780 TREPONEMA PALLIDUM: CPT | Performed by: OBSTETRICS & GYNECOLOGY

## 2025-03-27 PROCEDURE — 80053 COMPREHEN METABOLIC PANEL: CPT | Performed by: OBSTETRICS & GYNECOLOGY

## 2025-03-27 PROCEDURE — 25010000002 FENTANYL CITRATE (PF) 100 MCG/2ML SOLUTION: Performed by: ANESTHESIOLOGY

## 2025-03-27 PROCEDURE — 25810000003 LACTATED RINGERS PER 1000 ML: Performed by: OBSTETRICS & GYNECOLOGY

## 2025-03-27 PROCEDURE — 59514 CESAREAN DELIVERY ONLY: CPT | Performed by: OBSTETRICS & GYNECOLOGY

## 2025-03-27 PROCEDURE — 86850 RBC ANTIBODY SCREEN: CPT | Performed by: OBSTETRICS & GYNECOLOGY

## 2025-03-27 PROCEDURE — 25010000002 PROMETHAZINE PER 50 MG: Performed by: OBSTETRICS & GYNECOLOGY

## 2025-03-27 PROCEDURE — 25010000002 MIDAZOLAM PER 1 MG: Performed by: ANESTHESIOLOGY

## 2025-03-27 DEVICE — SEAL HEMO SURG ARISTA/AH ABS/PWDR 3GM: Type: IMPLANTABLE DEVICE | Site: UTERUS | Status: FUNCTIONAL

## 2025-03-27 RX ORDER — MORPHINE SULFATE 0.5 MG/ML
INJECTION, SOLUTION EPIDURAL; INTRATHECAL; INTRAVENOUS AS NEEDED
Status: DISCONTINUED | OUTPATIENT
Start: 2025-03-27 | End: 2025-03-27 | Stop reason: SURG

## 2025-03-27 RX ORDER — SODIUM CHLORIDE 0.9 % (FLUSH) 0.9 %
10 SYRINGE (ML) INJECTION EVERY 12 HOURS SCHEDULED
Status: DISCONTINUED | OUTPATIENT
Start: 2025-03-27 | End: 2025-03-27 | Stop reason: HOSPADM

## 2025-03-27 RX ORDER — SODIUM CHLORIDE 0.9 % (FLUSH) 0.9 %
10 SYRINGE (ML) INJECTION EVERY 12 HOURS SCHEDULED
Status: CANCELLED | OUTPATIENT
Start: 2025-03-27

## 2025-03-27 RX ORDER — OXYCODONE HYDROCHLORIDE 5 MG/1
5 TABLET ORAL EVERY 4 HOURS PRN
Status: DISCONTINUED | OUTPATIENT
Start: 2025-03-27 | End: 2025-03-29 | Stop reason: HOSPADM

## 2025-03-27 RX ORDER — ACETAMINOPHEN 325 MG/1
650 TABLET ORAL EVERY 6 HOURS
Status: DISCONTINUED | OUTPATIENT
Start: 2025-03-28 | End: 2025-03-29 | Stop reason: HOSPADM

## 2025-03-27 RX ORDER — FENTANYL CITRATE 50 UG/ML
INJECTION, SOLUTION INTRAMUSCULAR; INTRAVENOUS AS NEEDED
Status: DISCONTINUED | OUTPATIENT
Start: 2025-03-27 | End: 2025-03-27 | Stop reason: SURG

## 2025-03-27 RX ORDER — ONDANSETRON 4 MG/1
4 TABLET, ORALLY DISINTEGRATING ORAL EVERY 8 HOURS PRN
Status: DISCONTINUED | OUTPATIENT
Start: 2025-03-27 | End: 2025-03-29 | Stop reason: HOSPADM

## 2025-03-27 RX ORDER — OXYTOCIN/0.9 % SODIUM CHLORIDE 30/500 ML
125 PLASTIC BAG, INJECTION (ML) INTRAVENOUS ONCE AS NEEDED
Status: DISCONTINUED | OUTPATIENT
Start: 2025-03-27 | End: 2025-03-29 | Stop reason: HOSPADM

## 2025-03-27 RX ORDER — METOCLOPRAMIDE HYDROCHLORIDE 5 MG/ML
INJECTION INTRAMUSCULAR; INTRAVENOUS AS NEEDED
Status: DISCONTINUED | OUTPATIENT
Start: 2025-03-27 | End: 2025-03-27 | Stop reason: SURG

## 2025-03-27 RX ORDER — OXYTOCIN/0.9 % SODIUM CHLORIDE 30/500 ML
999 PLASTIC BAG, INJECTION (ML) INTRAVENOUS ONCE
Status: CANCELLED | OUTPATIENT
Start: 2025-03-27 | End: 2025-03-27

## 2025-03-27 RX ORDER — METHYLERGONOVINE MALEATE 0.2 MG/ML
200 INJECTION INTRAVENOUS AS NEEDED
Status: CANCELLED | OUTPATIENT
Start: 2025-03-27

## 2025-03-27 RX ORDER — OXYCODONE HYDROCHLORIDE 10 MG/1
10 TABLET ORAL EVERY 4 HOURS PRN
Status: DISCONTINUED | OUTPATIENT
Start: 2025-03-27 | End: 2025-03-29 | Stop reason: HOSPADM

## 2025-03-27 RX ORDER — SODIUM CHLORIDE, SODIUM LACTATE, POTASSIUM CHLORIDE, CALCIUM CHLORIDE 600; 310; 30; 20 MG/100ML; MG/100ML; MG/100ML; MG/100ML
125 INJECTION, SOLUTION INTRAVENOUS CONTINUOUS
Status: DISCONTINUED | OUTPATIENT
Start: 2025-03-27 | End: 2025-03-27

## 2025-03-27 RX ORDER — MISOPROSTOL 200 UG/1
600 TABLET ORAL AS NEEDED
Status: DISCONTINUED | OUTPATIENT
Start: 2025-03-27 | End: 2025-03-29 | Stop reason: HOSPADM

## 2025-03-27 RX ORDER — BUPIVACAINE HCL/0.9 % NACL/PF 0.125 %
PLASTIC BAG, INJECTION (ML) EPIDURAL AS NEEDED
Status: DISCONTINUED | OUTPATIENT
Start: 2025-03-27 | End: 2025-03-27 | Stop reason: SURG

## 2025-03-27 RX ORDER — SODIUM CHLORIDE, SODIUM LACTATE, POTASSIUM CHLORIDE, CALCIUM CHLORIDE 600; 310; 30; 20 MG/100ML; MG/100ML; MG/100ML; MG/100ML
INJECTION, SOLUTION INTRAVENOUS CONTINUOUS PRN
Status: DISCONTINUED | OUTPATIENT
Start: 2025-03-27 | End: 2025-03-27 | Stop reason: SURG

## 2025-03-27 RX ORDER — ACETAMINOPHEN 500 MG
1000 TABLET ORAL ONCE
Status: CANCELLED | OUTPATIENT
Start: 2025-03-27 | End: 2025-03-27

## 2025-03-27 RX ORDER — ACETAMINOPHEN 500 MG
1000 TABLET ORAL ONCE
Status: COMPLETED | OUTPATIENT
Start: 2025-03-27 | End: 2025-03-27

## 2025-03-27 RX ORDER — LIDOCAINE HYDROCHLORIDE 10 MG/ML
0.5 INJECTION, SOLUTION EPIDURAL; INFILTRATION; INTRACAUDAL; PERINEURAL ONCE AS NEEDED
Status: CANCELLED | OUTPATIENT
Start: 2025-03-27

## 2025-03-27 RX ORDER — IBUPROFEN 600 MG/1
600 TABLET, FILM COATED ORAL EVERY 6 HOURS
Status: DISCONTINUED | OUTPATIENT
Start: 2025-03-28 | End: 2025-03-29 | Stop reason: HOSPADM

## 2025-03-27 RX ORDER — ACETAMINOPHEN 500 MG
1000 TABLET ORAL EVERY 6 HOURS
Status: DISPENSED | OUTPATIENT
Start: 2025-03-27 | End: 2025-03-28

## 2025-03-27 RX ORDER — BUPIVACAINE HCL/0.9 % NACL/PF 0.1 %
2000 PLASTIC BAG, INJECTION (ML) EPIDURAL ONCE
Status: CANCELLED | OUTPATIENT
Start: 2025-03-27 | End: 2025-03-27

## 2025-03-27 RX ORDER — PROMETHAZINE HYDROCHLORIDE 12.5 MG/1
12.5 SUPPOSITORY RECTAL EVERY 6 HOURS PRN
Status: CANCELLED | OUTPATIENT
Start: 2025-03-27

## 2025-03-27 RX ORDER — KETOROLAC TROMETHAMINE 30 MG/ML
30 INJECTION, SOLUTION INTRAMUSCULAR; INTRAVENOUS ONCE
Status: CANCELLED | OUTPATIENT
Start: 2025-03-27 | End: 2025-03-27

## 2025-03-27 RX ORDER — HYDROMORPHONE HYDROCHLORIDE 1 MG/ML
0.5 INJECTION, SOLUTION INTRAMUSCULAR; INTRAVENOUS; SUBCUTANEOUS
Status: DISCONTINUED | OUTPATIENT
Start: 2025-03-27 | End: 2025-03-27 | Stop reason: HOSPADM

## 2025-03-27 RX ORDER — ONDANSETRON 2 MG/ML
INJECTION INTRAMUSCULAR; INTRAVENOUS AS NEEDED
Status: DISCONTINUED | OUTPATIENT
Start: 2025-03-27 | End: 2025-03-27 | Stop reason: SURG

## 2025-03-27 RX ORDER — MISOPROSTOL 200 UG/1
800 TABLET ORAL AS NEEDED
Status: CANCELLED | OUTPATIENT
Start: 2025-03-27

## 2025-03-27 RX ORDER — CALCIUM CARBONATE 500 MG/1
1 TABLET, CHEWABLE ORAL EVERY 4 HOURS PRN
Status: DISCONTINUED | OUTPATIENT
Start: 2025-03-27 | End: 2025-03-29 | Stop reason: HOSPADM

## 2025-03-27 RX ORDER — OXYTOCIN 10 [USP'U]/ML
INJECTION, SOLUTION INTRAMUSCULAR; INTRAVENOUS AS NEEDED
Status: DISCONTINUED | OUTPATIENT
Start: 2025-03-27 | End: 2025-03-27 | Stop reason: SURG

## 2025-03-27 RX ORDER — CITRIC ACID/SODIUM CITRATE 334-500MG
30 SOLUTION, ORAL ORAL ONCE
Status: COMPLETED | OUTPATIENT
Start: 2025-03-27 | End: 2025-03-27

## 2025-03-27 RX ORDER — SODIUM CHLORIDE 0.9 % (FLUSH) 0.9 %
10 SYRINGE (ML) INJECTION AS NEEDED
Status: DISCONTINUED | OUTPATIENT
Start: 2025-03-27 | End: 2025-03-27 | Stop reason: HOSPADM

## 2025-03-27 RX ORDER — CARBOPROST TROMETHAMINE 250 UG/ML
250 INJECTION, SOLUTION INTRAMUSCULAR AS NEEDED
Status: DISCONTINUED | OUTPATIENT
Start: 2025-03-27 | End: 2025-03-29 | Stop reason: HOSPADM

## 2025-03-27 RX ORDER — KETOROLAC TROMETHAMINE 15 MG/ML
15 INJECTION, SOLUTION INTRAMUSCULAR; INTRAVENOUS EVERY 6 HOURS
Status: COMPLETED | OUTPATIENT
Start: 2025-03-27 | End: 2025-03-28

## 2025-03-27 RX ORDER — SIMETHICONE 80 MG
80 TABLET,CHEWABLE ORAL 4 TIMES DAILY PRN
Status: DISCONTINUED | OUTPATIENT
Start: 2025-03-27 | End: 2025-03-29 | Stop reason: HOSPADM

## 2025-03-27 RX ORDER — METHYLERGONOVINE MALEATE 0.2 MG/ML
200 INJECTION INTRAVENOUS AS NEEDED
Status: DISCONTINUED | OUTPATIENT
Start: 2025-03-27 | End: 2025-03-29 | Stop reason: HOSPADM

## 2025-03-27 RX ORDER — PROMETHAZINE HYDROCHLORIDE 12.5 MG/1
12.5 TABLET ORAL EVERY 4 HOURS PRN
Status: DISCONTINUED | OUTPATIENT
Start: 2025-03-27 | End: 2025-03-29 | Stop reason: HOSPADM

## 2025-03-27 RX ORDER — LIDOCAINE HYDROCHLORIDE 10 MG/ML
0.5 INJECTION, SOLUTION EPIDURAL; INFILTRATION; INTRACAUDAL; PERINEURAL ONCE AS NEEDED
Status: DISCONTINUED | OUTPATIENT
Start: 2025-03-27 | End: 2025-03-27 | Stop reason: HOSPADM

## 2025-03-27 RX ORDER — ALUMINA, MAGNESIA, AND SIMETHICONE 2400; 2400; 240 MG/30ML; MG/30ML; MG/30ML
15 SUSPENSION ORAL EVERY 4 HOURS PRN
Status: DISCONTINUED | OUTPATIENT
Start: 2025-03-27 | End: 2025-03-29 | Stop reason: HOSPADM

## 2025-03-27 RX ORDER — PROMETHAZINE HYDROCHLORIDE 12.5 MG/1
12.5 TABLET ORAL EVERY 6 HOURS PRN
Status: CANCELLED | OUTPATIENT
Start: 2025-03-27

## 2025-03-27 RX ORDER — MIDAZOLAM HYDROCHLORIDE 1 MG/ML
INJECTION, SOLUTION INTRAMUSCULAR; INTRAVENOUS AS NEEDED
Status: DISCONTINUED | OUTPATIENT
Start: 2025-03-27 | End: 2025-03-27 | Stop reason: SURG

## 2025-03-27 RX ORDER — SODIUM CHLORIDE, SODIUM LACTATE, POTASSIUM CHLORIDE, CALCIUM CHLORIDE 600; 310; 30; 20 MG/100ML; MG/100ML; MG/100ML; MG/100ML
125 INJECTION, SOLUTION INTRAVENOUS CONTINUOUS
Status: CANCELLED | OUTPATIENT
Start: 2025-03-27 | End: 2025-03-28

## 2025-03-27 RX ORDER — CARBOPROST TROMETHAMINE 250 UG/ML
250 INJECTION, SOLUTION INTRAMUSCULAR AS NEEDED
Status: CANCELLED | OUTPATIENT
Start: 2025-03-27

## 2025-03-27 RX ORDER — PRENATAL VIT/IRON FUM/FOLIC AC 27MG-0.8MG
1 TABLET ORAL DAILY
Status: DISCONTINUED | OUTPATIENT
Start: 2025-03-27 | End: 2025-03-29 | Stop reason: HOSPADM

## 2025-03-27 RX ORDER — SODIUM CHLORIDE 9 MG/ML
40 INJECTION, SOLUTION INTRAVENOUS AS NEEDED
Status: CANCELLED | OUTPATIENT
Start: 2025-03-27

## 2025-03-27 RX ORDER — BUPIVACAINE HYDROCHLORIDE 7.5 MG/ML
INJECTION, SOLUTION INTRASPINAL AS NEEDED
Status: DISCONTINUED | OUTPATIENT
Start: 2025-03-27 | End: 2025-03-27 | Stop reason: SURG

## 2025-03-27 RX ORDER — CITRIC ACID/SODIUM CITRATE 334-500MG
30 SOLUTION, ORAL ORAL ONCE
Status: CANCELLED | OUTPATIENT
Start: 2025-03-27 | End: 2025-03-27

## 2025-03-27 RX ORDER — SODIUM CHLORIDE 9 MG/ML
40 INJECTION, SOLUTION INTRAVENOUS AS NEEDED
Status: DISCONTINUED | OUTPATIENT
Start: 2025-03-27 | End: 2025-03-27 | Stop reason: HOSPADM

## 2025-03-27 RX ORDER — OXYTOCIN/0.9 % SODIUM CHLORIDE 30/500 ML
250 PLASTIC BAG, INJECTION (ML) INTRAVENOUS CONTINUOUS
Status: CANCELLED | OUTPATIENT
Start: 2025-03-27 | End: 2025-03-27

## 2025-03-27 RX ORDER — FAMOTIDINE 10 MG/ML
INJECTION, SOLUTION INTRAVENOUS AS NEEDED
Status: DISCONTINUED | OUTPATIENT
Start: 2025-03-27 | End: 2025-03-27 | Stop reason: SURG

## 2025-03-27 RX ORDER — SODIUM CHLORIDE 0.9 % (FLUSH) 0.9 %
10 SYRINGE (ML) INJECTION AS NEEDED
Status: CANCELLED | OUTPATIENT
Start: 2025-03-27

## 2025-03-27 RX ORDER — OXYTOCIN/0.9 % SODIUM CHLORIDE 30/500 ML
PLASTIC BAG, INJECTION (ML) INTRAVENOUS AS NEEDED
Status: DISCONTINUED | OUTPATIENT
Start: 2025-03-27 | End: 2025-03-27 | Stop reason: SURG

## 2025-03-27 RX ORDER — ENOXAPARIN SODIUM 100 MG/ML
40 INJECTION SUBCUTANEOUS NIGHTLY
Status: DISCONTINUED | OUTPATIENT
Start: 2025-03-28 | End: 2025-03-29 | Stop reason: HOSPADM

## 2025-03-27 RX ORDER — KETOROLAC TROMETHAMINE 30 MG/ML
30 INJECTION, SOLUTION INTRAMUSCULAR; INTRAVENOUS ONCE
Status: COMPLETED | OUTPATIENT
Start: 2025-03-27 | End: 2025-03-27

## 2025-03-27 RX ORDER — DOCUSATE SODIUM 100 MG/1
100 CAPSULE, LIQUID FILLED ORAL 2 TIMES DAILY PRN
Status: DISCONTINUED | OUTPATIENT
Start: 2025-03-27 | End: 2025-03-29 | Stop reason: HOSPADM

## 2025-03-27 RX ORDER — HYDROCORTISONE 25 MG/G
1 CREAM TOPICAL AS NEEDED
Status: DISCONTINUED | OUTPATIENT
Start: 2025-03-27 | End: 2025-03-29 | Stop reason: HOSPADM

## 2025-03-27 RX ADMIN — MORPHINE SULFATE 0.15 MG: 0.5 INJECTION, SOLUTION EPIDURAL; INTRATHECAL; INTRAVENOUS at 15:39

## 2025-03-27 RX ADMIN — SODIUM CHLORIDE, POTASSIUM CHLORIDE, SODIUM LACTATE AND CALCIUM CHLORIDE: 600; 310; 30; 20 INJECTION, SOLUTION INTRAVENOUS at 15:31

## 2025-03-27 RX ADMIN — SODIUM CHLORIDE, SODIUM LACTATE, POTASSIUM CHLORIDE, CALCIUM CHLORIDE 125 ML/HR: 20; 30; 600; 310 INJECTION, SOLUTION INTRAVENOUS at 13:45

## 2025-03-27 RX ADMIN — OXYTOCIN 10 UNITS: 10 INJECTION, SOLUTION INTRAMUSCULAR; INTRAVENOUS at 16:00

## 2025-03-27 RX ADMIN — ONDANSETRON 4 MG: 4 TABLET, ORALLY DISINTEGRATING ORAL at 20:17

## 2025-03-27 RX ADMIN — Medication 100 MCG: at 15:46

## 2025-03-27 RX ADMIN — BUPIVACAINE HYDROCHLORIDE IN DEXTROSE 1.3 ML: 7.5 INJECTION, SOLUTION SUBARACHNOID at 15:39

## 2025-03-27 RX ADMIN — Medication 100 MCG: at 16:11

## 2025-03-27 RX ADMIN — FAMOTIDINE 20 MG: 10 INJECTION, SOLUTION INTRAVENOUS at 15:33

## 2025-03-27 RX ADMIN — MIDAZOLAM HYDROCHLORIDE 1 MG: 1 INJECTION, SOLUTION INTRAMUSCULAR; INTRAVENOUS at 16:10

## 2025-03-27 RX ADMIN — KETOROLAC TROMETHAMINE 15 MG: 15 INJECTION, SOLUTION INTRAMUSCULAR; INTRAVENOUS at 23:19

## 2025-03-27 RX ADMIN — FENTANYL CITRATE 30 MCG: 50 INJECTION, SOLUTION INTRAMUSCULAR; INTRAVENOUS at 16:10

## 2025-03-27 RX ADMIN — Medication 500 ML: at 16:28

## 2025-03-27 RX ADMIN — ACETAMINOPHEN 1000 MG: 500 TABLET, FILM COATED ORAL at 20:07

## 2025-03-27 RX ADMIN — Medication 100 MCG: at 15:53

## 2025-03-27 RX ADMIN — ACETAMINOPHEN 1000 MG: 500 TABLET ORAL at 14:01

## 2025-03-27 RX ADMIN — Medication 500 ML: at 16:01

## 2025-03-27 RX ADMIN — KETOROLAC TROMETHAMINE 30 MG: 30 INJECTION, SOLUTION INTRAMUSCULAR; INTRAVENOUS at 17:51

## 2025-03-27 RX ADMIN — FENTANYL CITRATE 20 MCG: 50 INJECTION, SOLUTION INTRAMUSCULAR; INTRAVENOUS at 15:39

## 2025-03-27 RX ADMIN — ONDANSETRON 4 MG: 2 INJECTION INTRAMUSCULAR; INTRAVENOUS at 15:33

## 2025-03-27 RX ADMIN — SODIUM CHLORIDE 2000 MG: 900 INJECTION INTRAVENOUS at 15:25

## 2025-03-27 RX ADMIN — SODIUM CITRATE AND CITRIC ACID MONOHYDRATE 30 ML: 500; 334 SOLUTION ORAL at 15:26

## 2025-03-27 RX ADMIN — PROMETHAZINE HYDROCHLORIDE 12.5 MG: 25 INJECTION, SOLUTION INTRAMUSCULAR; INTRAVENOUS at 21:51

## 2025-03-27 RX ADMIN — MIDAZOLAM HYDROCHLORIDE 1 MG: 1 INJECTION, SOLUTION INTRAMUSCULAR; INTRAVENOUS at 15:33

## 2025-03-27 RX ADMIN — Medication 200 MCG: at 16:16

## 2025-03-27 RX ADMIN — METOCLOPRAMIDE 10 MG: 5 INJECTION, SOLUTION INTRAMUSCULAR; INTRAVENOUS at 15:33

## 2025-03-27 NOTE — ANESTHESIA PROCEDURE NOTES
Spinal Block      Patient reassessed immediately prior to procedure    Patient location during procedure: OB  Performed By  Anesthesiologist: Gopi Santillan DO  Preanesthetic Checklist  Completed: patient identified, IV checked, site marked, risks and benefits discussed, surgical consent, monitors and equipment checked, pre-op evaluation and timeout performed  Spinal Block Prep:  Patient Position:sitting  Sterile Tech:cap, gloves, mask and sterile barriers  Prep:Chloraprep  Patient Monitoring:blood pressure monitoring, continuous pulse oximetry and EKG    Spinal Block Procedure  Approach:midline  Guidance:landmark technique and palpation technique  Location:L3-L4  Needle Type:Pencan  Needle Gauge:24 G  Placement of Spinal needle event:cerebrospinal fluid aspirated  Paresthesia: no  Fluid Appearance:clear     Post Assessment  Patient Tolerance:patient tolerated the procedure well with no apparent complications  Complications no

## 2025-03-27 NOTE — H&P
Sin  More Basstet  : 2000  MRN: [id number]  [facility]: [id number]    History and Physical  CC: GHTN, history of c/s  Subjective   More Bassett is a 24 y.o. year old  with an Estimated Date of Delivery: 25 at 37 weeks and 0 days who presented to the office yesterday for routine prenatal care with mild range blood pressure x 2.  She completed outpatient labs which were normal.  She presented today for repeat blood pressure and continues to have mild range blood pressures.  The recommendation was to proceed with delivery given diagnosis of gestational hypertension at greater than 37 weeks.    Her placental location is  posterior/fundal .  She is not planning for sterilization at the time of the .    Prenatal care has been with Dr. Rodriguez.  It has been complicated by history of c/s and desired TOLAC if spontaneous labor occurred, history of GHTN .    OB History    Para Term  AB Living   3 2 2 0 0 2   SAB IAB Ectopic Molar Multiple Live Births   0 0 0 0 0 2      # Outcome Date GA Lbr Aftab/2nd Weight Sex Type Anes PTL Lv   3 Current            2 Term 21 39w0d  3235 g (7 lb 2.1 oz) M CS-LTranv Spinal N LUCINA      Name: ADAN BASSETT      Apgar1: 8  Apgar5: 9   1 Term 19 38w0d  3360 g (7 lb 6.5 oz) M CS-LTranv EPI N LUCINA      Birth Comments: On Mag for high bp       Complications: Failure to Progress in First Stage, Intraamniotic Infection, Occiput posterior presentation of fetus, Gestational hypertension      Name: ADAN BASSETT      Apgar1: 4  Apgar5: 9      Obstetric Comments   2019 - John   2021 - Ant     Past Medical History:   Diagnosis Date    Depression     Postpartum - not currently a problem    Gestational hypertension     Seasonal allergies      Past Surgical History:   Procedure Laterality Date     SECTION N/A 7/3/2019    Procedure: Primary LTCS (2 layer closure);  Surgeon: Everardo South MD;   Location: [facility] LABOR DELIVERY;  Service: Obstetrics/Gynecology     SECTION N/A 2021    Procedure:  SECTION REPEAT;  Surgeon: Ariel Benitez MD;  Location: [facility] LABOR DELIVERY;  Service: Obstetrics/Gynecology;  Laterality: N/A;    FOREIGN BODY REMOVAL      glass removed from foot    WISDOM TOOTH EXTRACTION       No current facility-administered medications for this encounter.    No Known Allergies    Social History    Tobacco Use      Smoking status: Never        Passive exposure: Never      Smokeless tobacco: Never    Review of Systems   Constitutional:  Negative for chills and fever.   HENT:  Negative for congestion and sore throat.    Respiratory:  Negative for shortness of breath and wheezing.    Cardiovascular:  Negative for chest pain and palpitations.   Gastrointestinal:  Negative for abdominal pain, nausea and vomiting.   Genitourinary:  Negative for frequency, vaginal bleeding and vaginal pain.   Musculoskeletal:  Negative for back pain and myalgias.   Neurological:  Negative for dizziness, light-headedness and headaches.   Psychiatric/Behavioral:  Negative for confusion. The patient is not nervous/anxious.          Objective   LMP 2024   General: well developed; well nourished  no acute distress  mentation appropriate   Heart: Not performed.   Lungs: breathing is unlabored   Abdomen: soft, non-tender; no masses  no umbilical or inguinal hernias are present  no hepato-splenomegaly     Prenatal Labs  Lab Results   Component Value Date    HGB 11.0 (L) 2025    RUBELLAABIGG 5.54 10/10/2024    HEPBSAG Non-Reactive 10/10/2024    ABSCRN Negative 10/10/2024    XAQ3RFA9 Non-Reactive 10/10/2024    HEPCVIRUSABY Non-Reactive 10/10/2024     (H) 2025    GGTFASTING 91 2025    MFR4NUPB 152 2025    JUD5BRGR 141 2025    GCT1BFTW 129 2025    STREPGPB negative 2025    URINECX No growth 10/10/2024    CHLAMNAA negative 10/10/2024     NGONORRHON negative 10/10/2024       Recent Labs  Lab Results   Component Value Date    HGB 11.0 (L) 2025    HCT 34.8 2025    WBC 8.93 2025     2025           Assessment   IUP with an Estimated Date of Delivery: 25  Planned  section for previous C/S - desired  but spontaenous onset of labor failed to occur  Gestational hypertension      Plan   Repeat   ABx and DVT prophylaxis    Eli Rodriguez MD  3/27/2025

## 2025-03-27 NOTE — ANESTHESIA POSTPROCEDURE EVALUATION
Patient: More Gaxiola    Procedure Summary       Date: 25 Room / Location: [facility] LABOR DELIVERY   [facility]IV[facility]    Anesthesia Start: 153 Anesthesia Stop:     Procedure:  SECTION REPEAT (Abdomen) Diagnosis:     Surgeons: Eli Rodriguez MD Provider: Gopi Santillan DO    Anesthesia Type: epidural ASA Status: 2            Anesthesia Type: epidural    Vitals  Vitals Value Taken Time   /85 25 16:35   Temp 97.6 °F (36.4 °C) 25 16:35   Pulse 112 25 16:38   Resp 18 25 16:35   SpO2 96 % 25 16:38   Vitals shown include unfiled device data.        Post Anesthesia Care and Evaluation    Patient location during evaluation: bedside  Patient participation: complete - patient participated  Level of consciousness: awake  Pain score: 0  Pain management: satisfactory to patient    Airway patency: patent  Anesthetic complications: No anesthetic complications  PONV Status: none  Cardiovascular status: acceptable and hemodynamically stable  Respiratory status: acceptable  Hydration status: acceptable

## 2025-03-27 NOTE — PROGRESS NOTES
Chief Complaint   Patient presents with    Blood Pressure Check       HPI: More is a  currently at 37w0d who today reports the following:  Contractions - No; Leaking - No; Vaginal bleeding -  No; Swelling of extremities - No.    ROS:  GI: Nausea - No; Constipation - No; Diarrhea - No    Neuro: Headache - No; Visual change - No      EXAM:  Vitals: See prenatal flowsheet   Abdomen: See prenatal flowsheet   Urine glucose/protein: See prenatal flowsheet   Pelvic: See prenatal flowsheet   MDM:   Impression: Supervision of high risk pregnancy  Previous C/S with planned repeat C/S  Gestational hypertension    Tests done today: none   Topics discussed: Continue with PNV's  Prenatal labs reviewed  Recommend proceeding with delivery given persistent mild range BP at greater than 37 weeks    Tests scheduled today for her next visit:   none

## 2025-03-27 NOTE — OP NOTE
Sin  More Gaxiola  : 2000  MRN: [id number]  CSN: [id number]     Section Operative Note    Pre-Operative Dx:   Intrauterine pregnancy at 37w0d weeks   Previous C/S - desired  but spontaenous onset of labor failed to occur  Gestational HTN      Postoperative dx:    Intrauterine pregnancy at 37w0d weeks   Same + delivered         Procedure: Repeat  (LTCS)  - 1 layer closure  Lysis of adhesions        Surgeon: Eli Rodriguez MD   Assistant: Surgical tech       Anesthesia: Spinal       EBL: 367 mls.   IV Fluids: 1200 mls.   UOP: 100 mls.     Antibiotics: cefazolin 2 gms     Infant:      Name:  Mildred Torres      Gender: female infant    Weight: 2985 g (6 lb 9.3 oz)    Apgars: 8  @ 1 minute / 9  @ 5 minutes     Procedural findings: Normal appearing uterus and bilateral fallopian tubes and ovaries     Procedure Details:   After the patient was adequately anesthetized, she was sterilely prepped and draped in the dorsal supine left lateral tilt position. A Pfannenstiel incision was created sharply with the knife. It was carried down to the fascia with the Bovie.  The fascia was cut transversely with the knife and extended in a curvilinear fashion with scissors. The fascia was freed from its midline insertion superiorly and inferiorly. Rectus muscles were  in the midline. The peritoneum was sharply entered and a bladder flap was not sharply created. The lower uterine segment was scored transversely with the knife. Clear amniotic fluid was seen. The infant's was in vertex presentation.  The head was delivered atraumatically. The mouth and nose were bulb suctioned.  The cord was clamped and the infant was handed to the delivery team which was in attendance.The placenta was spontaneously extracted. The uterus was exteriorized and wiped free of debris and clot. There were two omental adhesion to the left rectus muscle and these were taken down with bovi cautery and were  hemostatic. The uterine incision was closed with #1 chromic in a continuous running locking fashion. Royce powder was placed over the hysterotomy incision. The bladder flap was not reapproximated.     The uterus was returned to the abdomen. The paracolic gutters were cleared of debris and clot. The fascia was closed with 0 PDS sewing in a running unlocked fashion. The subcutaneous tissue was copiously irrigated. Kavya's fascia was closed with 3-0 plain gut and the skin was closed with 4-0 monocryl subcuticularly. Skin glue applied to the incision.  All counts were correct.         Complications:   None      Disposition:   Mother to Mother Baby/Postpartum  in stable condition currently.   Baby to NBN  in stable condition currently.       Eli Rodriguez MD  3/27/2025  16:52 EDT

## 2025-03-27 NOTE — ANESTHESIA PREPROCEDURE EVALUATION
Anesthesia Evaluation     Patient summary reviewed and Nursing notes reviewed   NPO Solid Status: > 6 hours  NPO Liquid Status: > 2 hours           Airway   Mallampati: II  TM distance: >3 FB  Neck ROM: full  Dental      Pulmonary - negative pulmonary ROS   Cardiovascular     (+) hypertension      Neuro/Psych  (+) psychiatric history Depression  GI/Hepatic/Renal/Endo - negative ROS     Musculoskeletal (-) negative ROS    Abdominal    Substance History - negative use     OB/GYN    (+) Pregnant, pregnancy induced hypertension        Other - negative ROS                   Anesthesia Plan    ASA 2     epidural       Anesthetic plan, risks, benefits, and alternatives have been provided, discussed and informed consent has been obtained with: patient.    Plan discussed with attending.    CODE STATUS:    Code Status (Patient has no pulse and is not breathing): CPR (Attempt to Resuscitate)  Medical Interventions (Patient has pulse or is breathing): Full Support  Level Of Support Discussed With: Patient

## 2025-03-28 LAB
BASOPHILS # BLD AUTO: 0.05 10*3/MM3 (ref 0–0.2)
BASOPHILS NFR BLD AUTO: 0.4 % (ref 0–1.5)
DEPRECATED RDW RBC AUTO: 38.7 FL (ref 37–54)
EOSINOPHIL # BLD AUTO: 0.01 10*3/MM3 (ref 0–0.4)
EOSINOPHIL NFR BLD AUTO: 0.1 % (ref 0.3–6.2)
ERYTHROCYTE [DISTWIDTH] IN BLOOD BY AUTOMATED COUNT: 13.5 % (ref 12.3–15.4)
HCT VFR BLD AUTO: 31.6 % (ref 34–46.6)
HGB BLD-MCNC: 9.7 G/DL (ref 12–15.9)
IMM GRANULOCYTES # BLD AUTO: 0.07 10*3/MM3 (ref 0–0.05)
IMM GRANULOCYTES NFR BLD AUTO: 0.6 % (ref 0–0.5)
LYMPHOCYTES # BLD AUTO: 1.46 10*3/MM3 (ref 0.7–3.1)
LYMPHOCYTES NFR BLD AUTO: 12.7 % (ref 19.6–45.3)
MCH RBC QN AUTO: 24 PG (ref 26.6–33)
MCHC RBC AUTO-ENTMCNC: 30.7 G/DL (ref 31.5–35.7)
MCV RBC AUTO: 78.2 FL (ref 79–97)
MONOCYTES # BLD AUTO: 0.76 10*3/MM3 (ref 0.1–0.9)
MONOCYTES NFR BLD AUTO: 6.6 % (ref 5–12)
NEUTROPHILS NFR BLD AUTO: 79.6 % (ref 42.7–76)
NEUTROPHILS NFR BLD AUTO: 9.14 10*3/MM3 (ref 1.7–7)
NRBC BLD AUTO-RTO: 0 /100 WBC (ref 0–0.2)
PLATELET # BLD AUTO: 229 10*3/MM3 (ref 140–450)
PMV BLD AUTO: 10.8 FL (ref 6–12)
RBC # BLD AUTO: 4.04 10*6/MM3 (ref 3.77–5.28)
WBC NRBC COR # BLD AUTO: 11.49 10*3/MM3 (ref 3.4–10.8)

## 2025-03-28 PROCEDURE — 85025 COMPLETE CBC W/AUTO DIFF WBC: CPT | Performed by: OBSTETRICS & GYNECOLOGY

## 2025-03-28 PROCEDURE — 25010000002 ENOXAPARIN PER 10 MG: Performed by: OBSTETRICS & GYNECOLOGY

## 2025-03-28 PROCEDURE — 99231 SBSQ HOSP IP/OBS SF/LOW 25: CPT | Performed by: STUDENT IN AN ORGANIZED HEALTH CARE EDUCATION/TRAINING PROGRAM

## 2025-03-28 PROCEDURE — 25010000002 KETOROLAC TROMETHAMINE PER 15 MG: Performed by: OBSTETRICS & GYNECOLOGY

## 2025-03-28 RX ORDER — FERROUS SULFATE 325(65) MG
325 TABLET ORAL
Status: DISCONTINUED | OUTPATIENT
Start: 2025-03-28 | End: 2025-03-29 | Stop reason: HOSPADM

## 2025-03-28 RX ADMIN — FERROUS SULFATE TAB 325 MG (65 MG ELEMENTAL FE) 325 MG: 325 (65 FE) TAB at 12:13

## 2025-03-28 RX ADMIN — DOCUSATE SODIUM 100 MG: 100 CAPSULE, LIQUID FILLED ORAL at 19:59

## 2025-03-28 RX ADMIN — ENOXAPARIN SODIUM 40 MG: 100 INJECTION SUBCUTANEOUS at 16:40

## 2025-03-28 RX ADMIN — KETOROLAC TROMETHAMINE 15 MG: 15 INJECTION, SOLUTION INTRAMUSCULAR; INTRAVENOUS at 16:40

## 2025-03-28 RX ADMIN — IBUPROFEN 600 MG: 600 TABLET, FILM COATED ORAL at 22:32

## 2025-03-28 RX ADMIN — KETOROLAC TROMETHAMINE 15 MG: 15 INJECTION, SOLUTION INTRAMUSCULAR; INTRAVENOUS at 12:13

## 2025-03-28 RX ADMIN — PRENATAL VITAMINS-IRON FUMARATE 27 MG IRON-FOLIC ACID 0.8 MG TABLET 1 TABLET: at 10:25

## 2025-03-28 RX ADMIN — SIMETHICONE 80 MG: 80 TABLET, CHEWABLE ORAL at 19:59

## 2025-03-28 RX ADMIN — ACETAMINOPHEN 1000 MG: 500 TABLET, FILM COATED ORAL at 10:25

## 2025-03-28 RX ADMIN — ACETAMINOPHEN 650 MG: 325 TABLET, FILM COATED ORAL at 20:00

## 2025-03-28 RX ADMIN — DOCUSATE SODIUM 100 MG: 100 CAPSULE, LIQUID FILLED ORAL at 10:25

## 2025-03-28 RX ADMIN — KETOROLAC TROMETHAMINE 15 MG: 15 INJECTION, SOLUTION INTRAMUSCULAR; INTRAVENOUS at 05:17

## 2025-03-28 RX ADMIN — ACETAMINOPHEN 1000 MG: 500 TABLET, FILM COATED ORAL at 02:10

## 2025-03-28 NOTE — ANESTHESIA POSTPROCEDURE EVALUATION
Patient: More Gaxiola    Procedure Summary       Date: 25 Room / Location: [facility] LABOR DELIVERY   [facility]IV[facility]    Anesthesia Start: 153 Anesthesia Stop:     Procedure:  SECTION REPEAT (Abdomen) Diagnosis:     Surgeons: Eli Rodriguez MD Provider: Gopi Santillan DO    Anesthesia Type: epidural ASA Status: 2            Anesthesia Type: epidural    Vitals  Vitals Value Taken Time   /81 25 16:34   Temp 97.7 °F (36.5 °C) 25 16:34   Pulse 70 25 16:34   Resp 16 25 16:34   SpO2 95 % 25 18:44   Vitals shown include unfiled device data.        Post Anesthesia Care and Evaluation    Patient location during evaluation: bedside  Patient participation: complete - patient participated  Level of consciousness: awake and awake and alert  Pain score: 0  Pain management: satisfactory to patient    Airway patency: patent  Anesthetic complications: No anesthetic complications  PONV Status: none  Cardiovascular status: acceptable, hemodynamically stable and stable  Respiratory status: acceptable  Hydration status: stable  Post Neuraxial Block status: Motor and sensory function returned to baseline and No signs or symptoms of PDPH

## 2025-03-28 NOTE — PROGRESS NOTES
VALER[place]  More Gaxiola  : 2000  MRN: [id number]  CSN: [id number]    Hospital Day: 2    Postpartum Day #1  Subjective     CC: hospital follow-up    Her pain is well controlled. Vaginal bleeding is normal in amount. She is ambulating without difficulty. Her baby is doing well. She has not yet voided .     Objective     Min/max vitals past 24 hours:   Temp  Min: 97.5 °F (36.4 °C)  Max: 98.4 °F (36.9 °C)  BP  Min: 110/73  Max: 135/89  Pulse  Min: 60  Max: 112  Resp  Min: 16  Max: 18        General: well developed; well nourished  no acute distress   Abdomen: soft, non-tender; no masses  incision is clean, dry, intact, and without drainage   Pelvic: Not performed   Ext: Calves NT     Lab Results   Component Value Date    WBC 11.49 (H) 2025    HGB 9.7 (L) 2025    HCT 31.6 (L) 2025    MCV 78.2 (L) 2025     2025    RH Positive 2025    HEPBSAG Non-Reactive 10/10/2024     Results from last 7 days   Lab Units 25  1350   TREPONEMA PALLIDUM AB TOTAL  Non-Reactive            Assessment   Postpartum Day #1 S/P   Gestational HTN - BP well controlled today   Postpartum anemia     Plan   Continue routine post-operative care  Ambulate  Advance diet  Explained need for oral iron for 1-2 months    Julienne Andrea MD  3/28/2025  09:57 EDT

## 2025-03-28 NOTE — PAYOR COMM NOTE
"Barry Gaxiola (24 y.o. Female)     From:Deena Nagel LPN, Utilization Review  Phone #[phone]  Fax #[phone]      [employer] ID#[id number]     Delivery Information:  C Section 3/27/25  Wt 2985 gms  Female  Apgars 8/9          Date of Birth   2000    Social Security Number       Address   [address] [address]    Home Phone   [phone]    MRN   [id number]       Yazidism   None    Marital Status   Significant Other                            Admission Date   3/27/2025    Admission Type   Elective    Admitting Provider   Eli Rodriguez MD    Attending Provider   Eli Rodriguez MD    Department, Room/Bed   [facility]ABY 4B, N428/1       Discharge Date       Discharge Disposition       Discharge Destination                                 Attending Provider: Eli Rodriguez MD    Allergies: No Known Allergies    Isolation: None   Infection: None   Code Status: CPR    Ht: 157.5 cm (62\")   Wt: 91.2 kg (201 lb)    Admission Cmt: None   Principal Problem: Postpartum care following  delivery 3/27/25 (Mildred Melissa) [Z39.2]                   Active Insurance as of 3/27/2025       Primary Coverage       Payor Plan Insurance Group Employer/Plan Group    [employer] [place] [employer] [place]ICA[place]        Payor Plan Address Payor Plan Phone Number Payor Plan Fax Number Effective Dates    [address] [address] [phone]  2018 - None Entered    [facility] [id number]         Subscriber Name Subscriber Birth Date Member ID       DANYELLEBARRY OPAL 2000 [id number]                     Emergency Contacts        (Rel.) Home Phone Work Phone Mobile Phone    bassem slade (Significant Other) -- -- [phone]    manuelchay (Grandparent) [phone] -- --              Insurance Information                  [facility] MEDICAID Phone: [phone]    Subscriber: Barry Gaxiola Subscriber#: [id number] "    Group#: -- Precert#: --    Authorization#: -- Effective Date: --             History & Physical        Eli Rodriguez MD at 25 1228          [facility]  More Bassett  : 2000  MRN: [id number]  CSN: [id number]    History and Physical  CC: GHTN, history of c/s  Subjective  More Bassett is a 24 y.o. year old  with an Estimated Date of Delivery: 25 at 37 weeks and 0 days who presented to the office yesterday for routine prenatal care with mild range blood pressure x 2.  She completed outpatient labs which were normal.  She presented today for repeat blood pressure and continues to have mild range blood pressures.  The recommendation was to proceed with delivery given diagnosis of gestational hypertension at greater than 37 weeks.    Her placental location is  posterior/fundal .  She is not planning for sterilization at the time of the .    Prenatal care has been with Dr. Rodriguez.  It has been complicated by history of c/s and desired TOLAC if spontaneous labor occurred, history of GHTN .    OB History    Para Term  AB Living   3 2 2 0 0 2   SAB IAB Ectopic Molar Multiple Live Births   0 0 0 0 0 2      # Outcome Date GA Lbr Aftab/2nd Weight Sex Type Anes PTL Lv   3 Current            2 Term 21 39w0d  3235 g (7 lb 2.1 oz) M CS-LTranv Spinal N LUCINA      Name: DANYELLEADAN      Apgar1: 8  Apgar5: 9   1 Term 19 38w0d  3360 g (7 lb 6.5 oz) M CS-LTranv EPI N LUCINA      Birth Comments: On Mag for high bp       Complications: Failure to Progress in First Stage, Intraamniotic Infection, Occiput posterior presentation of fetus, Gestational hypertension      Name: ADAN BASSETT      Apgar1: 4  Apgar5: 9      Obstetric Comments   2019 - John   2021 - Ant     Past Medical History:   Diagnosis Date    Depression     Postpartum - not currently a problem    Gestational hypertension     Seasonal allergies      Past Surgical  History:   Procedure Laterality Date     SECTION N/A 7/3/2019    Procedure: Primary LTCS (2 layer closure);  Surgeon: Everardo South MD;  Location: [facility] LABOR DELIVERY;  Service: Obstetrics/Gynecology     SECTION N/A 2021    Procedure:  SECTION REPEAT;  Surgeon: Ariel Benitez MD;  Location: [facility] LABOR DELIVERY;  Service: Obstetrics/Gynecology;  Laterality: N/A;    FOREIGN BODY REMOVAL      glass removed from foot    WISDOM TOOTH EXTRACTION       No current facility-administered medications for this encounter.    No Known Allergies    Social History    Tobacco Use      Smoking status: Never        Passive exposure: Never      Smokeless tobacco: Never    Review of Systems   Constitutional:  Negative for chills and fever.   HENT:  Negative for congestion and sore throat.    Respiratory:  Negative for shortness of breath and wheezing.    Cardiovascular:  Negative for chest pain and palpitations.   Gastrointestinal:  Negative for abdominal pain, nausea and vomiting.   Genitourinary:  Negative for frequency, vaginal bleeding and vaginal pain.   Musculoskeletal:  Negative for back pain and myalgias.   Neurological:  Negative for dizziness, light-headedness and headaches.   Psychiatric/Behavioral:  Negative for confusion. The patient is not nervous/anxious.          Objective  LMP 2024   General: well developed; well nourished  no acute distress  mentation appropriate   Heart: Not performed.   Lungs: breathing is unlabored   Abdomen: soft, non-tender; no masses  no umbilical or inguinal hernias are present  no hepato-splenomegaly     Prenatal Labs  Lab Results   Component Value Date    HGB 11.0 (L) 2025    RUBELLAABIGG 5.54 10/10/2024    HEPBSAG Non-Reactive 10/10/2024    ABSCRN Negative 10/10/2024    AQE0UCK2 Non-Reactive 10/10/2024    HEPCVIRUSABY Non-Reactive 10/10/2024     (H) 2025    GGTFASTING 91 2025    QYE2NHWS 152 2025     SMI2HXVR 141 2025    WDP4TKYQ 129 2025    STREPGPB negative 2025    URINECX No growth 10/10/2024    CHLAMNAA negative 10/10/2024    NGONORRHON negative 10/10/2024       Recent Labs  Lab Results   Component Value Date    HGB 11.0 (L) 2025    HCT 34.8 2025    WBC 8.93 2025     2025           Assessment  IUP with an Estimated Date of Delivery: 25  Planned  section for previous C/S - desired  but spontaenous onset of labor failed to occur  Gestational hypertension      Plan  Repeat   ABx and DVT prophylaxis    Eli Rodriguez MD  3/27/2025          Electronically signed by Eli Rodriguez MD at 25 1231       Facility-Administered Medications as of 3/28/2025   Medication Dose Route Frequency Provider Last Rate Last Admin    [COMPLETED] acetaminophen (TYLENOL) tablet 1,000 mg  1,000 mg Oral Once Eli Rodriguez MD   1,000 mg at 25 1401    acetaminophen (TYLENOL) tablet 1,000 mg  1,000 mg Oral Q6H Eli Rodriguez MD   1,000 mg at 25 0210    Followed by    acetaminophen (TYLENOL) tablet 650 mg  650 mg Oral Q6H Eli Rodriguez MD        aluminum-magnesium hydroxide-simethicone (MAALOX MAX) 400-400-40 MG/5ML suspension 15 mL  15 mL Oral Q4H PRN Eli Rodriguez MD        Or    calcium carbonate (TUMS) chewable tablet 500 mg (200 mg elemental)  1 tablet Oral Q4H PRN Eli Rodriguez MD        carboprost (HEMABATE) injection 250 mcg  250 mcg Intramuscular PRN Eli Rodriguez MD        [COMPLETED] ceFAZolin 2000 mg IVPB in 100 mL NS (MBP)  2,000 mg Intravenous Once Eli Rodriguez MD   2,000 mg at 25 1525    docusate sodium (COLACE) capsule 100 mg  100 mg Oral BID PRN Eli Rodriguez MD        enoxaparin sodium (LOVENOX) syringe 40 mg  40 mg Subcutaneous Nightly Eli Rodriguez MD        Hydrocortisone (Perianal) (ANUSOL-HC) 2.5 % rectal cream 1 Application  1 Application Rectal PRN Eli Rodriguez,  "MD        ketorolac (TORADOL) injection 15 mg  15 mg Intravenous Q6H Eli Rodriguez MD   15 mg at 03/28/25 0517    Followed by    ibuprofen (ADVIL,MOTRIN) tablet 600 mg  600 mg Oral Q6H Eli Rodriguez MD        [COMPLETED] ketorolac (TORADOL) injection 30 mg  30 mg Intravenous Once Eli Rodriguez MD   30 mg at 03/27/25 1751    lanolin topical 1 Application  1 Application Topical Q1H PRN Eli Rodriguez MD        methylergonovine (METHERGINE) injection 200 mcg  200 mcg Intramuscular PRN Eli Rodriguez MD        miSOPROStol (CYTOTEC) tablet 600 mcg  600 mcg Oral PRN Eli Rodriguez MD        ondansetron ODT (ZOFRAN-ODT) disintegrating tablet 4 mg  4 mg Oral Q8H PRN Eli Rodriguez MD   4 mg at 03/27/25 2017    oxyCODONE (ROXICODONE) immediate release tablet 5 mg  5 mg Oral Q4H PRN Eli Rodriguez MD        Or    oxyCODONE (ROXICODONE) immediate release tablet 10 mg  10 mg Oral Q4H PRN Eli Rodriguez MD        oxytocin (PITOCIN) 30 units in 0.9% sodium chloride 500 mL (premix)  125 mL/hr Intravenous Once PRN Eli Rodriguez MD        prenatal vitamin tablet 1 tablet  1 tablet Oral Daily Eli Rodriguez MD        [COMPLETED] promethazine (PHENERGAN) 12.5 mg in sodium chloride 0.9 % 50 mL  12.5 mg Intravenous Once Winsome Hodges MD 66.7 mL/hr at 03/27/25 2151 12.5 mg at 03/27/25 2151    promethazine (PHENERGAN) tablet 12.5 mg  12.5 mg Oral Q4H PRN Eli Rodriguez MD        simethicone (MYLICON) chewable tablet 80 mg  80 mg Oral 4x Daily PRN Eli Rodriguez MD        [COMPLETED] Sod Citrate-Citric Acid (BICITRA) oral solution 30 mL  30 mL Oral Once Eli Rodriguez MD   30 mL at 03/27/25 1526     Orders (last 24 hrs)        Start     Ordered    03/28/25 2300  ibuprofen (ADVIL,MOTRIN) tablet 600 mg  Every 6 Hours        Placed in \"Followed by\" Linked Group    03/27/25 1916    03/28/25 2000  acetaminophen (TYLENOL) tablet 650 mg  Every 6 Hours        Placed in \"Followed by\" " "Linked Group    03/27/25 1916 03/28/25 1700  enoxaparin sodium (LOVENOX) syringe 40 mg  Nightly         03/27/25 1916 03/28/25 0800  Ambulate Patient  2 Times Daily      Comments: After anesthesia wears off.    03/27/25 1916 03/28/25 0600  Discontinue Indwelling Urinary Catheter in AM  Once        Comments: Austin catheter may be removed at 8 hours post-op if urine output is adequate (>30 ml/hr)    03/27/25 1916 03/28/25 0600  Wound Care  Per Order Details        Comments: Postop Day 1. Remove Dressing & Leave Incision Open to Air.    03/27/25 1916 03/28/25 0600  CBC & Differential  Morning Draw         03/27/25 1916 03/28/25 0600  CBC Auto Differential  PROCEDURE ONCE         03/27/25 2202    03/28/25 0000  Remove Abdominal Dressing  Once         03/27/25 1916 03/27/25 2300  ketorolac (TORADOL) injection 15 mg  Every 6 Hours        Placed in \"Followed by\" Linked Group    03/27/25 1916 03/27/25 2145  promethazine (PHENERGAN) 12.5 mg in sodium chloride 0.9 % 50 mL  Once         03/27/25 2055 03/27/25 2000  Incentive spirometry RT  Every Hour       03/27/25 1916 03/27/25 2000  prenatal vitamin tablet 1 tablet  Daily         03/27/25 1916 03/27/25 2000  acetaminophen (TYLENOL) tablet 1,000 mg  Every 6 Hours        Placed in \"Followed by\" Linked Group    03/27/25 1916 03/27/25 1917  Code Status and Medical Interventions: CPR (Attempt to Resuscitate); Full  Continuous         03/27/25 1916 03/27/25 1917  Vital Signs Per Hospital Policy  Per Hospital Policy/Protocol         03/27/25 1916 03/27/25 1917  Notify Provider  Continuous        Comments: Open Order Report to View Parameters Requiring Provider Notification    03/27/25 1916 03/27/25 1917  Patient May Shower  Per Order Details        Comments: After Anesthesia Wears Off & With Assistance    03/27/25 1916 03/27/25 1917  Diet: Regular/House; Fluid Consistency: Thin (IDDSI 0)  Diet Effective Now         03/27/25 1916    " "03/27/25 1917  Advance Diet As Tolerated -Regular  Until Discontinued         03/27/25 1916 03/27/25 1917  I/O  Every Shift       03/27/25 1916 03/27/25 1917  Fundal and Lochia Check  Per Order Details        Comments: Every 30 Minutes x2, Every 1 Hour x4, Every 4 Hours x24 Hours, Then Every Shift.    03/27/25 1916 03/27/25 1917  Weigh Patient  Once         03/27/25 1916 03/27/25 1917  Continue Indwelling Urinary Catheter Already in Place  Once         03/27/25 1916 03/27/25 1917  Notify Provider if Bladder Distention Continues  Continuous        Comments: Post Catheter Removal    03/27/25 1916 03/27/25 1917  Urinary Catheter Care  Every Shift,   Status:  Canceled       03/27/25 1916 03/27/25 1917  Turn Cough Deep Breathe  Once         03/27/25 1916 03/27/25 1917  Encourage early intake of PO fluids  Continuous         03/27/25 1916 03/27/25 1917  Ambulate Patient 3-5 times per day (with or without Austin)  Every Shift       03/27/25 1916 03/27/25 1917  Apply Abdominal Binding Until Discontinued  Until Discontinued         03/27/25 1916 03/27/25 1917  \"If patient tolerates food and liquids after completion of second bag of Pitocin, saline lock IV and discontinue IV fluid infusions.  Once         03/27/25 1916 03/27/25 1917  Breast pump to bed  Once         03/27/25 1916 03/27/25 1917  If indicated -- Please administer RH Immunoglobulin based on results of cord blood evaluation and fetal screen lab tests, pharmacy to dispense  Per Order Details        Comments: See Process Instructions For Reference Range Details.    03/27/25 1916 03/27/25 1917  Place Sequential Compression Device  Once         03/27/25 1916 03/27/25 1917  Maintain Sequential Compression Device  Continuous         03/27/25 1916 03/27/25 1916  oxytocin (PITOCIN) 30 units in 0.9% sodium chloride 500 mL (premix)  Once As Needed         03/27/25 1916 03/27/25 1916  docusate sodium (COLACE) capsule 100 mg  " "2 Times Daily PRN         03/27/25 1916 03/27/25 1916  simethicone (MYLICON) chewable tablet 80 mg  4 Times Daily PRN         03/27/25 1916 03/27/25 1916  lanolin topical 1 Application  Every 1 Hour PRN         03/27/25 1916 03/27/25 1916  carboprost (HEMABATE) injection 250 mcg  As Needed         03/27/25 1916 03/27/25 1916  miSOPROStol (CYTOTEC) tablet 600 mcg  As Needed         03/27/25 1916 03/27/25 1916  methylergonovine (METHERGINE) injection 200 mcg  As Needed         03/27/25 1916 03/27/25 1916  Hydrocortisone (Perianal) (ANUSOL-HC) 2.5 % rectal cream 1 Application  As Needed         03/27/25 1916 03/27/25 1916  aluminum-magnesium hydroxide-simethicone (MAALOX MAX) 400-400-40 MG/5ML suspension 15 mL  Every 4 Hours PRN        Placed in \"Or\" Linked Group    03/27/25 1916 03/27/25 1916  calcium carbonate (TUMS) chewable tablet 500 mg (200 mg elemental)  Every 4 Hours PRN        Placed in \"Or\" Linked Group    03/27/25 1916 03/27/25 1916  oxyCODONE (ROXICODONE) immediate release tablet 5 mg  Every 4 Hours PRN        Placed in \"Or\" Linked Group    03/27/25 1916 03/27/25 1916  oxyCODONE (ROXICODONE) immediate release tablet 10 mg  Every 4 Hours PRN        Placed in \"Or\" Linked Group    03/27/25 1916 03/27/25 1916  ondansetron ODT (ZOFRAN-ODT) disintegrating tablet 4 mg  Every 8 Hours PRN         03/27/25 1916 03/27/25 1916  promethazine (PHENERGAN) tablet 12.5 mg  Every 4 Hours PRN         03/27/25 1916 03/27/25 1830  ketorolac (TORADOL) injection 30 mg  Once        Note to Pharmacy: ERAS protocol    03/27/25 1744    03/27/25 1800  Respirations  Every Hour      Comments: If respiratory rate is less than 10/min, notify the Anesthesiologist    03/27/25 1711    03/27/25 1712  Oxygen Therapy- Nasal Cannula; 2 LPM  Continuous         03/27/25 1711    03/27/25 1712  If Respiratory Rate is Less Than 8/Min, See Narcan Order. Notify Anesthesiologist STAT.  Continuous         03/27/25 " 1711    03/27/25 1712  Blood Pressure and Pulse Every 4 Hours  Continuous         03/27/25 1711    03/27/25 1712  Activity & Removal of Austin Catheter, Per Obstetrician  Continuous         03/27/25 1711    03/27/25 1712  Notify Anesthesiologist for Any Questions / Problems  Continuous         03/27/25 1711    03/27/25 1712  Notify Anesthesia for Temp Over 101.4F  Continuous         03/27/25 1711    03/27/25 1712  Ambu Bag at Bedside  Continuous         03/27/25 1711    03/27/25 1711  HYDROmorphone (DILAUDID) injection 0.5 mg  Every 30 Minutes PRN,   Status:  Discontinued         03/27/25 1711    03/27/25 1652  Transfer Patient  Once         03/27/25 1652    03/27/25 1600  Vital Signs q 4 while awake  Every 4 Hours,   Status:  Canceled      Comments: While the patient is awake.    03/27/25 1320    03/27/25 1415  sodium chloride 0.9 % flush 10 mL  Every 12 Hours Scheduled,   Status:  Discontinued         03/27/25 1320    03/27/25 1415  lactated ringers bolus 1,500 mL  Once,   Status:  Discontinued         03/27/25 1320    03/27/25 1415  lactated ringers infusion  Continuous,   Status:  Discontinued         03/27/25 1320    03/27/25 1415  Sod Citrate-Citric Acid (BICITRA) oral solution 30 mL  Once         03/27/25 1320    03/27/25 1415  acetaminophen (TYLENOL) tablet 1,000 mg  Once         03/27/25 1320    03/27/25 1415  ceFAZolin 2000 mg IVPB in 100 mL NS (MBP)  Once         03/27/25 1320    03/27/25 1407  POC Glucose Once  PROCEDURE ONCE        Comments: Complete no more than 45 minutes prior to patient eating      03/27/25 1406    03/27/25 1321  Admit To Obstetrics Inpatient  Once         03/27/25 1320    03/27/25 1321  Code Status and Medical Interventions: CPR (Attempt to Resuscitate); Full Support  Continuous,   Status:  Canceled         03/27/25 1320    03/27/25 1321  Obtain informed consent  Once        Comments: If not already obtained in PAT    03/27/25 1320    03/27/25 1321  Vital Signs Per hospital policy   "Per Hospital Policy/Protocol,   Status:  Canceled         03/27/25 1320    03/27/25 1321  Continuous Fetal Monitoring With NST on Admission and Prior to Initiation of Oxytocin.  Per Order Details,   Status:  Canceled        Comments: Continuous Fetal Monitoring With NST on Admission    03/27/25 1320    03/27/25 1321  External Uterine Contraction Monitoring  Per Hospital Policy/Protocol,   Status:  Canceled         03/27/25 1320    03/27/25 1321  Notify Physician (specified)  Until Discontinued,   Status:  Canceled         03/27/25 1320    03/27/25 1321  Up Ad Bettina  Until Discontinued,   Status:  Canceled         03/27/25 1320    03/27/25 1321  Insert Indwelling Urinary Catheter  Once,   Status:  Canceled        Comments: Follow Protocol As Outlined in Process Instructions (Open Order Report to View Full Instructions)   Placed in \"And\" Linked Group    03/27/25 1320    03/27/25 1321  Assess Need for Indwelling Urinary Catheter - Follow Removal Protocol  Continuous,   Status:  Canceled        Comments: Follow Protocol As Outlined in Process Instructions (Open Order Report to View Full Instructions)   Placed in \"And\" Linked Group    03/27/25 1320    03/27/25 1321  Urinary Catheter Care  Every Shift,   Status:  Canceled      Placed in \"And\" Linked Group    03/27/25 1320    03/27/25 1321  Abdominal Prep with Clippers  Once,   Status:  Canceled         03/27/25 1320    03/27/25 1321  Chlorhexadine Skin Prep Unless Otherwise Indicated  Once,   Status:  Canceled         03/27/25 1320    03/27/25 1321  SCD (sequential compression devices)  Once         03/27/25 1320    03/27/25 1321  POC Glucose Once  Once,   Status:  Canceled         03/27/25 1320    03/27/25 1321  Document Gatorade Consumption Prior to Admission (Yes or No)  Once         03/27/25 1320    03/27/25 1321  NPO Diet NPO Type: Ice Chips  Diet Effective Now,   Status:  Canceled         03/27/25 1320    03/27/25 1321  Treponema pallidum AB w/Reflex RPR  STAT         " 03/27/25 1320    03/27/25 1321  Insert Peripheral IV  Once         03/27/25 1320    03/27/25 1321  Saline Lock & Maintain IV Access  Continuous,   Status:  Canceled         03/27/25 1320    03/27/25 1321  Type & Screen  STAT        Comments: If does not have current Type and Screen      03/27/25 1320    03/27/25 1321  Protein / Creatinine Ratio, Urine - Urine, Clean Catch  Once         03/27/25 1320    03/27/25 1321  Lactate Dehydrogenase  Once         03/27/25 1320    03/27/25 1321  Uric Acid  Once         03/27/25 1320    03/27/25 1321  Comprehensive Metabolic Panel  Once         03/27/25 1320    03/27/25 1321  CBC (No Diff)  STAT        Comments: If does not have CBC within the past 48 hours      03/27/25 1320    03/27/25 1320  sodium chloride 0.9 % flush 10 mL  As Needed,   Status:  Discontinued         03/27/25 1320    03/27/25 1320  sodium chloride 0.9 % infusion 40 mL  As Needed,   Status:  Discontinued         03/27/25 1320    03/27/25 1320  lidocaine PF 1% (XYLOCAINE) injection 0.5 mL  Once As Needed,   Status:  Discontinued         03/27/25 1320    Unscheduled  IV Site Care  As Needed       03/27/25 1711    Unscheduled  Up with Assistance  As Needed       03/27/25 1916    Unscheduled  Bladder Scan if Patient Unable to Void 4-6 Hours After Catheter Removal  As Needed         03/27/25 1916    Unscheduled  Straight Cath Every 4-6 Hours As Needed If Patient is Unable to Void After 4-6 Hours, Bladder Scan Volume is Greater Than 500mL & Patient Has Symptoms of Bladder Discomfort / Distention  As Needed       03/27/25 1916    Unscheduled  Schedule / Prompt Voiding For Patients With Urinary Incontinence  As Needed       03/27/25 1916    Unscheduled  Chewing Gum  As Needed       03/27/25 1916    Unscheduled  Warm compress  As Needed       03/27/25 1916    Unscheduled  Apply ace wrap, tight bra, or binder  As Needed       03/27/25 1916    Unscheduled  Apply ice packs  As Needed       03/27/25 1916                      Operative/Procedure Notes (last 24 hours)        Eli Rodriguez MD at 25 1655          See operative report.     Electronically signed by Eli Rodriguez MD at 25 9919       Eli Rodriguez MD at 25 0819           Sin Gaxiola  : 2000  MRN: [id number]  CSN: [id number]     Section Operative Note    Pre-Operative Dx:   Intrauterine pregnancy at 37w0d weeks   Previous C/S - desired  but spontaenous onset of labor failed to occur  Gestational HTN      Postoperative dx:    Intrauterine pregnancy at 37w0d weeks   Same + delivered         Procedure: Repeat  (LTCS)  - 1 layer closure  Lysis of adhesions        Surgeon: Eli Rodriguez MD   Assistant: Surgical tech       Anesthesia: Spinal       EBL: 367 mls.   IV Fluids: 1200 mls.   UOP: 100 mls.     Antibiotics: cefazolin 2 gms     Infant:      Name:  Mildred Torres      Gender: female infant    Weight: 2985 g (6 lb 9.3 oz)    Apgars: 8  @ 1 minute / 9  @ 5 minutes     Procedural findings: Normal appearing uterus and bilateral fallopian tubes and ovaries     Procedure Details:   After the patient was adequately anesthetized, she was sterilely prepped and draped in the dorsal supine left lateral tilt position. A Pfannenstiel incision was created sharply with the knife. It was carried down to the fascia with the Bovie.  The fascia was cut transversely with the knife and extended in a curvilinear fashion with scissors. The fascia was freed from its midline insertion superiorly and inferiorly. Rectus muscles were  in the midline. The peritoneum was sharply entered and a bladder flap was not sharply created. The lower uterine segment was scored transversely with the knife. Clear amniotic fluid was seen. The infant's was in vertex presentation.  The head was delivered atraumatically. The mouth and nose were bulb suctioned.  The cord was clamped and the infant was handed to the delivery team  which was in attendance.The placenta was spontaneously extracted. The uterus was exteriorized and wiped free of debris and clot. There were two omental adhesion to the left rectus muscle and these were taken down with bovi cautery and were hemostatic. The uterine incision was closed with #1 chromic in a continuous running locking fashion. Royce powder was placed over the hysterotomy incision. The bladder flap was not reapproximated.     The uterus was returned to the abdomen. The paracolic gutters were cleared of debris and clot. The fascia was closed with 0 PDS sewing in a running unlocked fashion. The subcutaneous tissue was copiously irrigated. Kavya's fascia was closed with 3-0 plain gut and the skin was closed with 4-0 monocryl subcuticularly. Skin glue applied to the incision.  All counts were correct.         Complications:   None      Disposition:   Mother to Mother Baby/Postpartum  in stable condition currently.   Baby to NBN  in stable condition currently.       Eli Rodriguez MD  3/27/2025  16:52 EDT        Electronically signed by Eli Rodriguez MD at 03/27/25 1654       Physician Progress Notes (last 24 hours)  Notes from 03/27/25 0737 through 03/28/25 0737   No notes of this type exist for this encounter.

## 2025-03-28 NOTE — LACTATION NOTE
Mom called for assistance with breastfeeding.  States infant has not nursed well since lactation assisted this AM.  Morning feeding was sluggish with lactation.  Stripped infant to diaper and changed wet diaper.  Infant fussy, tried to soothe and initiate sucking with FST.  No coordinated suck achieved.  Infant's mouth felt dry.  Assisted with positioning at the breast, small shield used.  Infant unable to latch or organize suck.  Offered use of donor breast milk to assist with nursing, mom agreed.  DBM syringed at the breast with small shield.  Infant able to latch and organize.  Needed constant stimulation and frequent syringing of milk to maintain feeding.  Infant nursed for 8 minutes on the right breast and 7 minutes on the left breast with 9 ml of DBM syringed at the breast during the feeding.      Mom set up on hands free Medela pump after nursing.  Encouraged to pump after every time nursing.  Instructed mom that she may request DBM to assist with breastfeeding if she desires.  Lactation will check in on mom tomorrow and reassess.         03/28/25 1446   Maternal Information   Date of Referral 03/28/25   Person Making Referral patient   Maternal Reason for Referral latch difficulty   Infant Reason for Referral 35-37 weeks gestation   Maternal Assessment   Breast Size Issue none   Breast Shape Bilateral:;round   Breast Density Bilateral:;soft   Nipples Bilateral:;everted   Left Nipple Symptoms intact;nontender   Right Nipple Symptoms intact;nontender   Maternal Infant Feeding   Maternal Emotional State receptive;relaxed   Infant Positioning clutch/football   Signs of Milk Transfer other (see comments)  (syringed breast milk at shield.)   Pain with Feeding no   Comfort Measures Before/During Feeding infant position adjusted   Nipple Shape After Feeding, Left Breast round;symmetrical;appropriately projected   Nipple Shape After Feeding, Right round;symmetrical;appropriately projected   Latch Assistance minimal  assistance;verbal guidance offered   Breastfeeding Supplementation   Maternal Indication for Supplementation other (see comments)  (No audible swallows with prior feed, few drops expressed with pumping.)   Infant Indication for Supplementation ineffective breastfeeding;maternal request  (uncoordinated at breast prior to syringed DBM)   Breastfeeding Supplementation Type human donor milk   Method of Supplementation syringe  (@ breast with shield)   Milk Expression/Equipment   Breast Pump Type double electric, personal  (hands free medela pump)   Breast Pumping   Breast Pumping Interventions post-feed pumping encouraged  (mom to pump after every nursing session until lactation revisits tomorrow.)   Breast Pumping double electric breast pump utilized

## 2025-03-28 NOTE — LACTATION NOTE
First time breastfeeding mom. Has nursed infant with shield but states she is very sleepy and difficult to keep awake.  Offered assistance and accepted.  Demonstrated and educated mom on  waking techniques.  Assisted with positioning infant in FB hold bilaterally and stimulating throughout feeding.  Infant sleep at breast but was able to nurse for 9 minutes on the right breast (no shield) and 3 minutes on the left breast (shield required).  No audible swallows.  Breastfeeding education provided throughout feeding.  Information given and reviewed after nursing.    Breast pump at home, encouraged to have family bring and begin pumping after nursing. Manual pump demonstrated and given to mom. Encouraged her to pump for skipped feeds or sluggish feeds that last under 10 minutes or are more sleeping than eating.       25 0750   Maternal Information   Date of Referral 25   Person Making Referral lactation consultant  (courtesy, first visit)   Maternal Reason for Referral no prior breastfeeding experience  (First time breastfeeding mom.)   Infant Reason for Referral  infant;35-37 weeks gestation   Maternal Assessment   Breast Size Issue none   Breast Shape Bilateral:;round   Breast Density Bilateral:;soft   Nipples Bilateral:;everted   Left Nipple Symptoms intact;nontender   Right Nipple Symptoms intact;nontender   Maternal Infant Feeding   Maternal Emotional State receptive;relaxed   Infant Positioning clutch/football   Signs of Milk Transfer (S)  none noted   Pain with Feeding no   Comfort Measures Before/During Feeding infant position adjusted;latch adjusted;maternal position adjusted   Milk Ejection Reflex present   Nipple Shape After Feeding, Left Breast round;symmetrical;appropriately projected   Nipple Shape After Feeding, Right round;symmetrical;appropriately projected   Latch Assistance minimal assistance;verbal guidance offered   Milk Expression/Equipment   Breast Pump Type manual pump;double  electric, personal   Breast Pump Flange Type hard   Breast Pump Flange Size 21 mm   Equipment for Home Use breast pump ordered through insurance   Breast Pumping   Breast Pumping Interventions post-feed pumping encouraged  (for sluggish or skipped feeds.)   Breast Pumping manual breast pump utilized

## 2025-03-28 NOTE — PAYOR COMM NOTE
"Barry Bassett (24 y.o. Female)       Date of Birth   2000    Social Security Number       Address   [address] [place] [address]    Home Phone   [phone]    MRN   [id number]       Congregation   None    Marital Status   Significant Other                            Admission Date   3/27/2025    Admission Type   Elective    Admitting Provider   Eli Rodriguez MD    Attending Provider   Eli Rodriguez MD    Department, Room/Bed   [facility] MOTHER BABY 4B, N428/1       Discharge Date       Discharge Disposition       Discharge Destination                                 Attending Provider: Eli Rodriguez MD    Allergies: No Known Allergies    Isolation: None   Infection: None   Code Status: CPR    Ht: 157.5 cm (62\")   Wt: 91.2 kg (201 lb)    Admission Cmt: None   Principal Problem: Postpartum care following  delivery 3/27/25 (Mildred Torres) [Z39.2]                   Active Insurance as of 3/27/2025       Primary Coverage       Payor Plan Insurance Group Employer/Plan Group    [employer] [place] [employer] [place]ICA[place]        Payor Plan Address Payor Plan Phone Number Payor Plan Fax Number Effective Dates    [address] [address] [phone]  2018 - None Entered    [facility] [id number]         Subscriber Name Subscriber Birth Date Member ID       BARRY BASSETT 2000 [id number]                     Emergency Contacts        (Rel.) Home Phone Work Phone Mobile Phone    bassem slade (Significant Other) -- -- [phone]    chay jackson (Grandparent) [phone] -- --                 Operative/Procedure Notes (last 24 hours)        Eli Rodriguez MD at 25 1655          See operative report.     Electronically signed by Eli Rodriguez MD at 25 1655       Eli Rodriguez MD at 25 1552           [place]  Barry Bassett  : 2000  MRN: [id number]  CSN: [id number]     Section Operative " Note    Pre-Operative Dx:   Intrauterine pregnancy at 37w0d weeks   Previous C/S - desired  but spontaenous onset of labor failed to occur  Gestational HTN      Postoperative dx:    Intrauterine pregnancy at 37w0d weeks   Same + delivered         Procedure: Repeat  (LTCS)  - 1 layer closure  Lysis of adhesions        Surgeon: Eil Rodriguez MD   Assistant: Surgical tech       Anesthesia: Spinal       EBL: 367 mls.   IV Fluids: 1200 mls.   UOP: 100 mls.     Antibiotics: cefazolin 2 gms     Infant:      Name:  Mildred Torres      Gender: female infant    Weight: 2985 g (6 lb 9.3 oz)    Apgars: 8  @ 1 minute / 9  @ 5 minutes     Procedural findings: Normal appearing uterus and bilateral fallopian tubes and ovaries     Procedure Details:   After the patient was adequately anesthetized, she was sterilely prepped and draped in the dorsal supine left lateral tilt position. A Pfannenstiel incision was created sharply with the knife. It was carried down to the fascia with the Bovie.  The fascia was cut transversely with the knife and extended in a curvilinear fashion with scissors. The fascia was freed from its midline insertion superiorly and inferiorly. Rectus muscles were  in the midline. The peritoneum was sharply entered and a bladder flap was not sharply created. The lower uterine segment was scored transversely with the knife. Clear amniotic fluid was seen. The infant's was in vertex presentation.  The head was delivered atraumatically. The mouth and nose were bulb suctioned.  The cord was clamped and the infant was handed to the delivery team which was in attendance.The placenta was spontaneously extracted. The uterus was exteriorized and wiped free of debris and clot. There were two omental adhesion to the left rectus muscle and these were taken down with bovi cautery and were hemostatic. The uterine incision was closed with #1 chromic in a continuous running locking fashion. Royce powder was  placed over the hysterotomy incision. The bladder flap was not reapproximated.     The uterus was returned to the abdomen. The paracolic gutters were cleared of debris and clot. The fascia was closed with 0 PDS sewing in a running unlocked fashion. The subcutaneous tissue was copiously irrigated. Kavya's fascia was closed with 3-0 plain gut and the skin was closed with 4-0 monocryl subcuticularly. Skin glue applied to the incision.  All counts were correct.         Complications:   None      Disposition:   Mother to Mother Baby/Postpartum  in stable condition currently.   Baby to NBN  in stable condition currently.       Eli Rodriguez MD  3/27/2025  16:52 EDT        Electronically signed by Eli Rodriguez MD at 03/27/25 1657       Physician Progress Notes (last 24 hours)  Notes from 03/26/25 2016 through 03/27/25 2016   No notes of this type exist for this encounter.

## 2025-03-29 VITALS
WEIGHT: 201 LBS | BODY MASS INDEX: 36.99 KG/M2 | RESPIRATION RATE: 20 BRPM | SYSTOLIC BLOOD PRESSURE: 136 MMHG | HEART RATE: 84 BPM | HEIGHT: 62 IN | OXYGEN SATURATION: 100 % | TEMPERATURE: 97.8 F | DIASTOLIC BLOOD PRESSURE: 87 MMHG

## 2025-03-29 PROBLEM — Z01.419 WELL WOMAN EXAM: Status: RESOLVED | Noted: 2019-08-10 | Resolved: 2025-03-29

## 2025-03-29 RX ORDER — IBUPROFEN 600 MG/1
600 TABLET, FILM COATED ORAL EVERY 6 HOURS PRN
Qty: 60 TABLET | Refills: 0 | Status: SHIPPED | OUTPATIENT
Start: 2025-03-29

## 2025-03-29 RX ORDER — ACETAMINOPHEN 325 MG/1
650 TABLET ORAL EVERY 6 HOURS PRN
Qty: 60 TABLET | Refills: 0 | Status: SHIPPED | OUTPATIENT
Start: 2025-03-29

## 2025-03-29 RX ORDER — PSEUDOEPHEDRINE HCL 30 MG
100 TABLET ORAL 2 TIMES DAILY PRN
Qty: 30 CAPSULE | Refills: 0 | Status: SHIPPED | OUTPATIENT
Start: 2025-03-29

## 2025-03-29 RX ORDER — FERROUS SULFATE 325(65) MG
325 TABLET ORAL
Qty: 60 TABLET | Refills: 1 | Status: SHIPPED | OUTPATIENT
Start: 2025-03-30

## 2025-03-29 RX ORDER — OXYCODONE HYDROCHLORIDE 5 MG/1
5 TABLET ORAL EVERY 6 HOURS PRN
Qty: 12 TABLET | Refills: 0 | Status: SHIPPED | OUTPATIENT
Start: 2025-03-29 | End: 2025-04-01

## 2025-03-29 RX ADMIN — PRENATAL VITAMINS-IRON FUMARATE 27 MG IRON-FOLIC ACID 0.8 MG TABLET 1 TABLET: at 08:34

## 2025-03-29 RX ADMIN — IBUPROFEN 600 MG: 600 TABLET, FILM COATED ORAL at 05:48

## 2025-03-29 RX ADMIN — ACETAMINOPHEN 650 MG: 325 TABLET, FILM COATED ORAL at 02:44

## 2025-03-29 RX ADMIN — IBUPROFEN 600 MG: 600 TABLET, FILM COATED ORAL at 11:33

## 2025-03-29 RX ADMIN — FERROUS SULFATE TAB 325 MG (65 MG ELEMENTAL FE) 325 MG: 325 (65 FE) TAB at 08:34

## 2025-03-29 RX ADMIN — ACETAMINOPHEN 650 MG: 325 TABLET, FILM COATED ORAL at 08:33

## 2025-03-29 RX ADMIN — ACETAMINOPHEN 650 MG: 325 TABLET, FILM COATED ORAL at 14:52

## 2025-03-29 RX ADMIN — DOCUSATE SODIUM 100 MG: 100 CAPSULE, LIQUID FILLED ORAL at 08:34

## 2025-03-29 NOTE — DISCHARGE SUMMARY
Discharge Summary    Date of Admission: 3/27/2025  Date of Discharge:  3/29/2025      Patient: More Gaxiola      MR#:[id number]    Primary Surgeon/OB: Eli Rodriguez MD    Discharge Surgeon/OB: Quan/Bahman     Presenting Problem/History of Present Illness  Gestational hypertension, third trimester [O13.3]       Postpartum care following  delivery 3/27/25 (Mildred Torres)    Gestational hypertension, third trimester        Discharge Diagnosis:  section at 37w0d    Procedures:  , Low Transverse    3/27/2025   3:58 PM     Feeding method: Breastfeeding Status: Yes    Rh Immune globulin given: no    Rubella vaccine given: no    Discharge Date: 3/29/2025; Discharge Time: 12:46 EDT    Early Discharge:  YES-I AUTHORIZE ONE MATERNAL- HOME VISIT    Hospital Course  Patient is a 24 y.o. female  at 37w0d status post  section with uneventful postoperative recovery.  Patient was advanced to regular diet on postoperative day#1.  On discharge, ambulating, tolerating a regular diet without any difficulties and her incision is dry, clean and intact.     Infant:   female fetus 2985 g (6 lb 9.3 oz) with Apgar scores of 8 , 9  at five minutes.    Circumcision: no    Condition on Discharge:  Stable    Vital Signs  Temp:  [97.7 °F (36.5 °C)-98.3 °F (36.8 °C)] 97.8 °F (36.6 °C)  Heart Rate:  [70-94] 84  Resp:  [16-20] 20  BP: (111-136)/(70-90) 136/87    Lab Results   Component Value Date    WBC 11.49 (H) 2025    HGB 9.7 (L) 2025    HCT 31.6 (L) 2025    MCV 78.2 (L) 2025     2025       Discharge Disposition  Home or Self Care    Discharge Medications     Discharge Medications        New Medications        Instructions Start Date   acetaminophen 325 MG tablet  Commonly known as: TYLENOL   650 mg, Oral, Every 6 Hours PRN      docusate sodium 100 MG capsule   100 mg, Oral, 2 Times Daily PRN      ferrous sulfate 325 (65 FE) MG tablet   325 mg, Oral,  Daily With Breakfast   Start Date: March 30, 2025     ibuprofen 600 MG tablet  Commonly known as: ADVIL,MOTRIN   600 mg, Oral, Every 6 Hours PRN      oxyCODONE 5 MG immediate release tablet  Commonly known as: ROXICODONE   5 mg, Oral, Every 6 Hours PRN             Continue These Medications        Instructions Start Date   chlorhexidine 0.12 % solution  Commonly known as: PERIDEX   2 Times Daily      PRENATAL VITAMIN PO   Take  by mouth.             Stop These Medications      aspirin 81 MG EC tablet              Discharge Diet:   Diet Instructions       Diet: Regular/House Diet; Thin (IDDSI 0)      Discharge Diet: Regular/House Diet    Fluid Consistency: Thin (IDDSI 0)            Activity at Discharge:   Activity Instructions       Driving Restrictions      Type of Restriction:  Driving  Lifting  Bathing  Sex       Driving Restrictions: No Driving (Time Limited)    Length: 2 Weeks    Explain Sexual Activity Restrictions: 6 weeks    Bathing Restrictions: No Tub Bath    Lifting Restrictions: Lifting Restriction (Indicate Limit)    Weight Limit (Pounds): 20    Length of Lifting Restriction: 2 weeks    Pelvic Rest              Follow-up Appointments  Future Appointments   Date Time Provider Department Center   4/2/2025  1:00 PM Eli Rodriguez MD [facility]   4/9/2025  1:00 PM Margaret Lopez APRN [facility]   4/16/2025  1:00 PM Eli Rodriguez MD [facility]   4/23/2025  1:00 PM Eli Rodriguez MD [facility]   4/30/2025  1:50 PM Eli Rodriguez MD [facility]   5/28/2025  3:00 PM [facility]     Additional Instructions for the Follow-ups that You Need to Schedule       Call MD With Problems / Concerns   As directed      Instructions: Monitor for excessive bleeding >1 pad/hr for several hours, dizziness, syncope, shortness of breath, chest pain, fever or changes in blood pressure. Call or go to ED with foul smelling discharge, fever of >100.4, signs of  postpartum depression, saturating a pad in <1 hour, blood clots larger than a golf ball. Also, call with headache that does not resolve with medication or rest, vision changes, pain in right upper quadrant, worsening swelling, or BP >140/90 if able to monitor at home.    Order Comments: Instructions: Monitor for excessive bleeding >1 pad/hr for several hours, dizziness, syncope, shortness of breath, chest pain, fever or changes in blood pressure. Call or go to ED with foul smelling discharge, fever of >100.4, signs of postpartum depression, saturating a pad in <1 hour, blood clots larger than a golf ball. Also, call with headache that does not resolve with medication or rest, vision changes, pain in right upper quadrant, worsening swelling, or BP >140/90 if able to monitor at home.         Discharge Follow-up with Specified Provider: ; 2 Weeks   As directed      To:    Follow Up: 2 Weeks                PRITI Morin  03/29/25  12:46 EDT

## 2025-03-29 NOTE — LACTATION NOTE
Mom said SLP assisted with breastfeeding this morning, and baby did well.  Since baby is 37 weeks gestation, Mom was encouraged to continue to pump after breastfeeding attempts and syringe feed any milk that is pumped to the baby.  Discharge lactation teaching was provided, including engorgement management.  She was encouraged to call for a follow-up lactation appointment in about a week.

## 2025-03-30 ENCOUNTER — MATERNAL SCREENING (OUTPATIENT)
Dept: CALL CENTER | Facility: HOSPITAL | Age: 25
End: 2025-03-30
Payer: COMMERCIAL

## 2025-03-30 NOTE — OUTREACH NOTE
Maternal Screening Survey      Flowsheet Row Responses   Eligibility Eligible   Prep survey completed? Yes   Facility patient discharged from? [facility] - Registered Nurse

## 2025-04-02 ENCOUNTER — POSTPARTUM VISIT (OUTPATIENT)
Dept: OBSTETRICS AND GYNECOLOGY | Facility: CLINIC | Age: 25
End: 2025-04-02
Payer: COMMERCIAL

## 2025-04-02 VITALS
SYSTOLIC BLOOD PRESSURE: 130 MMHG | HEIGHT: 62 IN | DIASTOLIC BLOOD PRESSURE: 80 MMHG | BODY MASS INDEX: 34.41 KG/M2 | WEIGHT: 187 LBS

## 2025-04-02 PROBLEM — Z87.59 HISTORY OF GESTATIONAL HYPERTENSION: Status: RESOLVED | Noted: 2024-12-05 | Resolved: 2025-04-02

## 2025-04-02 NOTE — PROGRESS NOTES
"Subjective   Chief Complaint   Patient presents with    Postpartum Care     More Gaxiola is a 24 y.o. year old  presenting to be seen for her postpartum visit.  She had a Repeat  (LTCS).  Her daughter is doing well.    Since delivery she has not been sexually active.  She does not have concerns about post-partum blues/depression.   [place] Score = 0  She is  breast feeding .  For ongoing contraception, her plans are undecided.    The following portions of the patient's history were reviewed and updated as appropriate:current medications and allergies    Social History    Tobacco Use      Smoking status: Never        Passive exposure: Never      Smokeless tobacco: Never      Review of Systems  Constitutional POS: nothing reported    NEG: anorexia or night sweats   Genitourinary POS: nothing reported    NEG: dysuria or hematuria      Gastointestinal POS: nothing reported    NEG: bloating, change in bowel habits, melena, or reflux symptoms   Breast POS: nothing reported    NEG: persistent breast lump, skin dimpling, or nipple discharge        Objective   /80 (BP Location: Right arm, Patient Position: Sitting, Cuff Size: Adult)   Ht 157.5 cm (62\")   Wt 84.8 kg (187 lb)   LMP 2024 (Exact Date)   Breastfeeding Yes   BMI 34.20 kg/m²     General: well developed; well nourished  no acute distress  mentation appropriate   Skin: Not performed.   Thyroid: not examined   Heart:  Not performed.   Lungs: breathing is unlabored   Breasts:  Not performed.   Abdomen: soft, non-tender; no masses  no umbilical or inguinal hernias are present  no hepato-splenomegaly   Pelvis: Not performed.        Assessment   Normal 1 week postpartum exam s/p repeat c/s  History of GHTN with normotensive BP today      Plan   BC options reviewed and compared today:  declines discussion. Reviewed no intercourse until cleared at 6 week appointment and proper interval spacing of pregnancy of ~ 18 months. They " verbalized understanding.   The importance of keeping all planned follow-up and taking all medications as prescribed was emphasized.  Follow up for postpartum visit in ~ 4-5 weeks for 6 week PPV.    No orders of the defined types were placed in this encounter.         This note was electronically signed.    Eli Rodriguez MD  April 2, 2025

## 2025-04-08 ENCOUNTER — MATERNAL SCREENING (OUTPATIENT)
Dept: CALL CENTER | Facility: HOSPITAL | Age: 25
End: 2025-04-08
Payer: COMMERCIAL

## 2025-04-08 NOTE — OUTREACH NOTE
Maternal Screening Survey      Flowsheet Row Responses   Facility patient discharged from? [facility]   Attempt successful? Yes   Call start time 1259   Call end time 1300   I have been able to laugh and see the funny side of things. 0   I have looked forward with enjoyment to things. 0   I have blamed myself unnecessarily when things went wrong. 0   I have been anxious or worried for no good reason. 0   I have felt scared or panicky for no good reason. 0   Things have been getting on top of me. 0   I have been so unhappy that I have had difficulty sleeping. 0   I have felt sad or miserable. 0   I have been so unhappy that I have been crying. 0   The thought of harming myself has occurred to me. 0   [place]  Depression Scale Total 0   Did any of your parents have problems with alcohol or drug use? No   Do any of your peers have problems with alcohol or drug use? No   Does your partner have problems with alcohol or drug use? No   Before you were pregnant did you have problems with alcohol or drug use? (past) No   In the past month, did you drink beer, wine, liquor or use any other drugs? (pregnancy) No   Maternal Screening call completed Yes   Call end time 1300              Odette ALVAREZ - Registered Nurse

## 2025-05-09 ENCOUNTER — POSTPARTUM VISIT (OUTPATIENT)
Dept: OBSTETRICS AND GYNECOLOGY | Facility: CLINIC | Age: 25
End: 2025-05-09
Payer: COMMERCIAL

## 2025-05-09 VITALS
SYSTOLIC BLOOD PRESSURE: 124 MMHG | BODY MASS INDEX: 32.02 KG/M2 | WEIGHT: 174 LBS | DIASTOLIC BLOOD PRESSURE: 84 MMHG | HEIGHT: 62 IN

## 2025-05-09 DIAGNOSIS — N94.9 ADNEXAL FULLNESS: ICD-10-CM

## 2025-05-09 PROBLEM — O13.3 GESTATIONAL HYPERTENSION, THIRD TRIMESTER: Status: RESOLVED | Noted: 2025-03-27 | Resolved: 2025-05-09

## 2025-05-09 NOTE — PROGRESS NOTES
"Subjective   Chief Complaint   Patient presents with    Postpartum Care     6w1d PP RCS     More Gaxiola is a 25 y.o. year old  presenting to be seen for her postpartum visit.  She had a Repeat  (LTCS).  Her daughter is doing well.    Since delivery she has not been sexually active.  She does not have concerns about post-partum blues/depression.   [place] Score = 0  She is  breast feeding .  For ongoing contraception, her plans are condoms.    The following portions of the patient's history were reviewed and updated as appropriate:current medications and allergies    Social History    Tobacco Use      Smoking status: Never        Passive exposure: Never      Smokeless tobacco: Never      Review of Systems  Constitutional POS: nothing reported    NEG: anorexia or night sweats   Genitourinary POS: nothing reported    NEG: dysuria or hematuria      Gastointestinal POS: nothing reported    NEG: bloating, change in bowel habits, melena, or reflux symptoms   Breast POS: nothing reported    NEG: persistent breast lump, skin dimpling, or nipple discharge        Objective   /84 (BP Location: Left arm, Patient Position: Sitting, Cuff Size: Adult)   Ht 157.5 cm (62\")   Wt 78.9 kg (174 lb)   Breastfeeding Yes   BMI 31.83 kg/m²     General: well developed; well nourished  no acute distress  mentation appropriate   Skin: No suspicious lesions seen   Thyroid: normal to inspection and palpation   Heart:  Not performed.   Lungs: breathing is unlabored   Breasts:  Examined in supine position  Symmetric without masses or skin dimpling  Nipples normal without inversion, lesions or discharge  There are no palpable axillary nodes  Patient is lactating.  No areas of erythema or tenderness noted and the nipples are normal   Abdomen: soft, non-tender; no masses  no umbilical or inguinal hernias are present  no hepato-splenomegaly  incision is clean, dry, intact, and without drainage   Pelvis: Clinical " staff was present for exam  External genitalia:  normal appearance of the external genitalia including Bartholin's and Electra's glands.  :  urethral meatus normal; urethra normal:  Vaginal:  normal pink mucosa without prolapse or lesions.  Cervix:  normal appearance.  Uterus:  normal size, shape and consistency.  Adnexa:  right adnexal fullness versus bowel  Rectal:  digital rectal exam not performed; anus visually normal appearing.        Assessment   Normal 6 week postpartum exam  Right adnexal fullness     Plan   BC options reviewed and compared today:  she declines discussion and plans to use condoms.  Reviewed proper interval spacing of pregnancy of 18 months especially with history of   Ultrasound needs to be done any time that is convenient for her.   Pap was not done today.  I explained to More that the recommendations for Pap smear interval in a low risk patient has lengthened to 3 years time.  I told More she still needs to be seen in our office yearly for a full physical including breast and pelvic exam.  The importance of keeping all planned follow-up and taking all medications as prescribed was emphasized.  Follow up for annual exam in ~ one year    No orders of the defined types were placed in this encounter.         This note was electronically signed.    Eli Rodriguez MD  May 9, 2025

## 2025-06-04 ENCOUNTER — TELEPHONE (OUTPATIENT)
Dept: OBSTETRICS AND GYNECOLOGY | Facility: CLINIC | Age: 25
End: 2025-06-04
Payer: COMMERCIAL

## 2025-06-04 DIAGNOSIS — R93.89 ABNORMAL ULTRASOUND: Primary | ICD-10-CM

## 2025-06-06 NOTE — TELEPHONE ENCOUNTER
Please apologize to patient for delay in communicating her pelvic ultrasound result.  Both of her ovaries overall appear normal.  There was an area over her prior  section scar which could just represent the healing process.  I would like to take a look at this again in 3 months.  Order has been placed for repeat ultrasound.  Please let me know if she has any questions or concerns.    Orders Placed This Encounter   Procedures    US Non-ob Transvaginal     Standing Status:   Future     Expected Date:   2025     Expiration Date:   2026     Reason for Exam::   follow up area over c/s scar     Release to patient:   Routine Release [[id number]]     Thanks, Eli Rodriguez MD     What Is The Reason For Today's Visit?: History of Non-Melanoma Skin Cancer How Many Skin Cancers Have You Had?: one When Was Your Last Cancer Diagnosed?: 2021-22

## (undated) DEVICE — SOL IRR H2O BO 1000ML STRL

## (undated) DEVICE — MAT PREVALON MOBL TRANSFR AIR WO/PAD 39X80IN

## (undated) DEVICE — PK C/SECT 10

## (undated) DEVICE — SUT PDS 1 TP1 48IN Z880G BX/12

## (undated) DEVICE — GLV SURG BIOGEL LTX PF 6

## (undated) DEVICE — ADHS SKIN PREMIERPRO EXOFIN TOPICAL HI/VISC .5ML

## (undated) DEVICE — APPL CHLORAPREP TINTED 26ML TEAL

## (undated) DEVICE — SOL IRR NACL 0.9PCT BO 1000ML

## (undated) DEVICE — SOL IRR NACL 0.9PCT BT 1000ML

## (undated) DEVICE — MAT PREVALON MOBL TRANSFR AIR W/PAD 39X80IN

## (undated) DEVICE — SOL IRR H2O BTL 1000ML STRL

## (undated) DEVICE — SUT PDS 0 CTX 36IN DYED Z370T

## (undated) DEVICE — MAT PREVALON MOBL TRANSFR AIR W/PAD REPROC 39X81IN

## (undated) DEVICE — PENCL SMOKE/EVAC MEGADYNE TELESCP 10FT

## (undated) DEVICE — SUT PLAIN  3/0 CT1 27IN 842H

## (undated) DEVICE — GLV SURG BIOGEL LTX PF 7 1/2

## (undated) DEVICE — SUT GUT CHRM 1 CTX 36IN 905H

## (undated) DEVICE — SUT VIC 2/0 CT1 27IN J339H BX/36

## (undated) DEVICE — SUT VIC 3/0 CT1 27IN DYED J338H

## (undated) DEVICE — TRY SPINE BLCK WHITACRE 25G 3X5IN

## (undated) DEVICE — GLV SURG BIOGEL LTX PF 6 1/2

## (undated) DEVICE — PATIENT RETURN ELECTRODE, SINGLE-USE, CONTACT QUALITY MONITORING, ADULT, WITH 9FT CORD, FOR PATIENTS WEIGING OVER 33LBS. (15KG): Brand: MEGADYNE

## (undated) DEVICE — 4-PORT MANIFOLD: Brand: NEPTUNE 2

## (undated) DEVICE — STPLR SKIN SUBCUTICULAR INSORB 2030

## (undated) DEVICE — CLTH CLENS READYCLEANSE PERI CARE PK/5

## (undated) DEVICE — TRAP FLD MINIVAC MEGADYNE 100ML

## (undated) DEVICE — BOWL UTIL STRL 32OZ